# Patient Record
Sex: FEMALE | Employment: UNEMPLOYED | URBAN - METROPOLITAN AREA
[De-identification: names, ages, dates, MRNs, and addresses within clinical notes are randomized per-mention and may not be internally consistent; named-entity substitution may affect disease eponyms.]

---

## 2024-10-03 ENCOUNTER — NURSE TRIAGE (OUTPATIENT)
Age: 34
End: 2024-10-03

## 2024-10-03 NOTE — TELEPHONE ENCOUNTER
Regarding: NP unsure of LMP  ----- Message from Maikol CLEMENS sent at 10/3/2024  9:50 AM EDT -----  New patient wanting to schedule with Obgydallin Zafar. Seen at [facility] in [place] for positive pregnancy unsure of her LMP late August early September was told she was about 7 weeks.

## 2024-10-03 NOTE — TELEPHONE ENCOUNTER
"Called to patient to discuss new pregnancy. Reports she was seen at [place], [place] on 09/30 and had an US that put her at approx 7.5-8 weeks- patient states they were unsure. Also had beta hcg level at 57,778. Attempted to schedule dating and viability scan, no availability in the next few weeks. Called over to the office, spoke with Randa. She advised they will have look through and contact back back for an appointment. Was advised to send message to the clerical staff.   Reason for Disposition   Information only question and nurse able to answer    Answer Assessment - Initial Assessment Questions  1. REASON FOR CALL or QUESTION: \"What is your reason for calling today?\" or \"How can I best help you?\" or \"What question do you have that I can help answer?\"      Unknown LMP, positive pregnancy test    Protocols used: Information Only Call - No Triage-ADULT-[place]    "

## 2024-10-07 NOTE — TELEPHONE ENCOUNTER
Pt called regarding appt she says they had been calling her about stated she does not know why the mailbox was stating full because it is not. Spoke with Zoey @ the office who stated the appt was taken and she will call the pt back once she finds another she can offer her. Pt states to please give her first available and first thing in the day pt states she will take anything.

## 2024-10-07 NOTE — TELEPHONE ENCOUNTER
Called patient X2 to offer an appointment on 10/08/2024 in [place] with Candice Powell, no answer, mailbox full, and does not have a mychart

## 2024-10-08 ENCOUNTER — ULTRASOUND (OUTPATIENT)
Age: 34
End: 2024-10-08
Payer: COMMERCIAL

## 2024-10-08 VITALS — DIASTOLIC BLOOD PRESSURE: 80 MMHG | SYSTOLIC BLOOD PRESSURE: 116 MMHG | WEIGHT: 249 LBS

## 2024-10-08 DIAGNOSIS — Z34.90 EARLY STAGE OF PREGNANCY: Primary | ICD-10-CM

## 2024-10-08 DIAGNOSIS — N91.2 AMENORRHEA: ICD-10-CM

## 2024-10-08 DIAGNOSIS — O21.9 NAUSEA AND VOMITING OF PREGNANCY, ANTEPARTUM: ICD-10-CM

## 2024-10-08 PROCEDURE — 99204 OFFICE O/P NEW MOD 45 MIN: CPT | Performed by: STUDENT IN AN ORGANIZED HEALTH CARE EDUCATION/TRAINING PROGRAM

## 2024-10-08 PROCEDURE — 76817 TRANSVAGINAL US OBSTETRIC: CPT | Performed by: STUDENT IN AN ORGANIZED HEALTH CARE EDUCATION/TRAINING PROGRAM

## 2024-10-08 RX ORDER — METOCLOPRAMIDE 10 MG/1
10 TABLET ORAL EVERY 6 HOURS PRN
Qty: 30 TABLET | Refills: 0 | Status: SHIPPED | OUTPATIENT
Start: 2024-10-08

## 2024-10-08 NOTE — PROGRESS NOTES
Ambulatory Visit  Name: Araceli Levy      : 1990      MRN: [id number]  Encounter Provider: Delisa Pérez MD  Encounter Date: 10/8/2024   Encounter department: [facility] [place]    Assessment & Plan  Amenorrhea    Orders:    [facility] US OB < 14 weeks single or first gestation level 1    Early stage of pregnancy  Early pregnancy at 8 weeks 3 days with a calculated AKILAH of May 17, 2024 based on early ultrasound not c/w LMP    - Genetic screening options reviewed. She is interested in NIPT, so MFM referral placed  - Prenatal care reviewed  - Pregnancy precautions reviewed  - Nausea and vomiting of pregnancy: reviewed OTC meds, reglan rx sent. Safe and effective use reviewed    RTO for OB interview and PN-1 visit  Orders:    Ambulatory Referral to Maternal Fetal Medicine; Future    Nausea and vomiting of pregnancy, antepartum    Orders:    metoclopramide (Reglan) 10 mg tablet; Take 1 tablet (10 mg total) by mouth every 6 (six) hours as needed (nausea/vomiting)    History of Present Illness     Araceli Levy is a 34 y.o. female who presents for verification of pregnancy.  female, Patient's last menstrual period was 2024 (exact date).. Menses are irregular generally. Prior to this year, she had 13 years without a cycle. Started taking care of herself, exercising, and started to have regular cycles this year. This pregnancy was a surprise.   She is accompanied by her partner.     OBHx is significant for prior SAb.  Taking a prenatal vitamin.         Review of Systems +nausea, +vomiting, keeping almost no food down. No vaginal bleeding        Objective     /80 (BP Location: Left arm, Patient Position: Sitting, Cuff Size: Large)   Wt 113 kg (249 lb)   LMP 2024 (Exact Date)     Physical Exam  Vitals and nursing note reviewed.   Constitutional:       General: She is not in acute distress.     Appearance: She is well-developed.   HENT:       Head: Normocephalic and atraumatic.   Cardiovascular:      Rate and Rhythm: Normal rate.   Pulmonary:      Effort: Pulmonary effort is normal. No respiratory distress.   Genitourinary:     General: Normal vulva.      Cervix: No cervical motion tenderness or cervical bleeding.   Skin:     General: Skin is warm and dry.      Capillary Refill: Capillary refill takes less than 2 seconds.   Neurological:      Mental Status: She is alert.   Psychiatric:         Mood and Affect: Mood normal.

## 2024-10-08 NOTE — PROGRESS NOTES
Pt is here for early ultrasound     G2  Miscarriage 15yrs ago   LMP 8/1  Irregular Cycles   9 weeks 5 days   AKILAH 5/8/25  [place] Pregnancy   No Vaginal Bleeding   Nausea and Vomiting  Blood Type N/A   Ultrasound Probe Disinfection    A transvaginal ultrasound was performed.   Prior to use, disinfection was performed with High Level Disinfection Process ([email or web address])  Probe serial number [id number]:  [id number] was used    ZACH NAVA MA  10/08/24  11:16 AM

## 2024-10-14 ENCOUNTER — TELEPHONE (OUTPATIENT)
Age: 34
End: 2024-10-14

## 2024-10-14 NOTE — TELEPHONE ENCOUNTER
Spoke with patients mother who is on communication consent form. She advised pt was seen in the ER on 10/11 for vaginal discharge and vomiting.  US showed possible polyp and they googled and read that it can cause a miscarriage.  They are now concerned.  She denies pt is having any bleeding.     Impression    1.   Live intrauterine pregnancy 8 weeks 4 days gestation.  No adnexal mass or  torsion.  2.   Heterogeneous echogenic focus within the cervical canal possibly polyp  although exact etiology uncertain. Hemorrhage or hematoma.

## 2024-10-18 NOTE — PATIENT INSTRUCTIONS
Congratulations!! Please review our Pregnancy Essential Guide and [facility] L&D Virtual tour from our networks website.     [facility] Pregnancy Essentials Guide  [facility] ([email or web address])     [facility] ([facility])

## 2024-10-21 ENCOUNTER — INITIAL PRENATAL (OUTPATIENT)
Dept: OBGYN CLINIC | Facility: CLINIC | Age: 34
End: 2024-10-21
Payer: COMMERCIAL

## 2024-10-21 VITALS — BODY MASS INDEX: 41.48 KG/M2 | HEIGHT: 65 IN | WEIGHT: 249 LBS

## 2024-10-21 DIAGNOSIS — Z36.9 ENCOUNTER FOR ANTENATAL SCREENING: ICD-10-CM

## 2024-10-21 DIAGNOSIS — Z31.430 ENCOUNTER OF FEMALE FOR TESTING FOR GENETIC DISEASE CARRIER STATUS FOR PROCREATIVE MANAGEMENT: ICD-10-CM

## 2024-10-21 DIAGNOSIS — Z34.81 PRENATAL CARE, SUBSEQUENT PREGNANCY, FIRST TRIMESTER: Primary | ICD-10-CM

## 2024-10-21 PROCEDURE — T1001 NURSING ASSESSMENT/EVALUATN: HCPCS

## 2024-10-21 NOTE — PROGRESS NOTES
OB INTAKE INTERVIEW  Patient is 34 y.o. who presents for OB intake at 10 2/7 wks  She is accompanied by herself during this encounter  The father of her baby Pierre Carrillo is involved in the pregnancy and is 36 years old.      Last Menstrual Period: 24  Ultrasound: Measured 8 weeks 3 days on 10/8/24  Estimated Date of Delivery: 25 changed by 8 week US    Signs/Symptoms of Pregnancy  Current pregnancy symptoms: Lower back ache and nausea  Constipation no  Headaches YES  Cramping/spotting YES- mild cramping occasional  PICA cravings no    Diabetes-  Body mass index is 41.44 kg/m².  If patient has 1 or more, please order early 1 hour GTT  History of GDM no  BMI >35 YES  History of PCOS or current metformin use no  History of LGA/macrosomic infant (4000g/9lbs) no    If patient has 2 or more, please order early 1 hour GTT  BMI>30 YES  AMA no  First degree relative with type 2 diabetes YES- Pt's Father T2DM  History of chronic HTN, hyperlipidemia, elevated A1C no  High risk race (, , ,  or ) YES-    Hypertension- if you answer yes to any of the following, please order baseline preeclampsia labs (cbc, comprehensive metabolic panel, urine protein creatinine ratio, uric acid)  History of of chronic HTN no  History of gestational HTN no  History of preeclampsia, eclampsia, or HELLP syndrome no  History of diabetes no  History of lupus,sjogrens syndrome, kidney disease no    Thyroid- if yes order TSH with reflex T4  History of thyroid disease no    Bleeding Disorder or Hx of DVT-patient or first degree relative with history of. Order the following if not done previously.   (Factor V, antithrombin III, prothrombin gene mutation, protein C and S Ag, lupus anticoagulant, anticardiolipin, beta-2 glycoprotein)   no    OB/GYN-  History of abnormal pap smear no       Date of last pap smear Never had a pap  History of HPV no  History of Herpes/HSV no  History  of other STI (gonorrhea, chlamydia, trich) no  History of prior  no  History of prior  no  History of  delivery prior to 36 weeks 6 days no  History of Varicella or Vaccination Does not remember getting vaccine. Did not have Varicella.  History of blood transfusion NO  Ok for blood transfusion YES    Substance screening-   History of tobacco use no  Currently using tobacco no  Substance Use Screen Level (N/A, LOW, HIGH) N/A    MRSA Screening-   Does the pt have a hx of MRSA? no    Immunizations:  Influenza vaccine given this season Not interested  Discussed Tdap vaccine YES  Discussed COVID Vaccine YES    Genetic/[facility]-  Do you or your partner have a history of any of the following in yourselves or first degree relatives?  Cystic fibrosis no  Spinal muscular atrophy no  Hemoglobinopathy/Sickle Cell/Thalassemia no  Fragile X Intellectual Disability no        If no, discuss Carrier Screening being completed once in a lifetime as a standard of care lab test. Place orders for Cystic Fibrosis Gene Test (XBQ602) and Spinal Muscular Atrophy DNA ([id number])      Appointment for Nuchal Translucency Ultrasound at [facility] scheduled for 24      Interview education  [facility] Pregnancy Essentials Book reviewed, discussed and attached to their AVS YES    Nurse/Family Partnership- patient may qualify NO; referral placed NO    Prenatal lab work scripts YES  Extra labs ordered:  CF and SMA screening  1 hr gtt    Aspirin/Preeclampsia Screen    Risk Level Risk Factor Recommendation   LOW Prior Uncomplicated full-term delivery no No Aspirin recommendation        MODERATE Nulliparity YES Recommend low-dose aspirin if     BMI>30 YES 2 or more moderate risk factors    Family History Preeclampsia (mother/sister) no     35yr old or greater no     Black Race, Concern for SDOH/Low Socioeconomic no     IVF Pregnancy  no     Personal History Risks (low birth weight, prior adverse preg outcome, >10yr preg interval) no          HIGH History of Preeclampsia no Recommend low-dose aspirin if     Multifetal gestation no 1 or more high risk factors    Chronic HTN no     Type 1 or 2 Diabetes no     Renal Disease no     Autoimmune Disease  no      Contraindications to ASA therapy:  NSAID/ ASA allergy: no  Nasal polyps: no  Asthma with history of ASA induced bronchospasm: no  Relative contraindications:  History of GI bleed: no  Active peptic ulcer disease: no  Severe hepatic dysfunction: no    Patient should be recommended to take ASA 162mg during this pregnancy from 12-36wks to lower her risk of preeclampsia: ASA therapy discussed.      The patient has a history now or in prior pregnancy notable for:  EPDS-1      Details that I feel the provider should be aware of: This is an unplanned however welcomed pregnancy for parents. This is a surprise pregnancy. Pt states she and FOB tried getting pregnant 14 years ago with no success. Pt states her OB at the time told her she was healthy enough to become pregnant and no issues were found. Pt states she does not remember ever having a pap done. Patient is experiencing some nausea. OTC meds and prescribed med was discussed including diet. 1 Hr GTT was ordered per screen. Pt knows when to call Doctor and how to contact her nurse navigator. Patient verbalized understanding.Patient knows to have lab orders completed before next appointment.          PN1 visit scheduled. The patient was oriented to our practice, the navigator role, reviewed delivering physicians and [facility] for Delivery. All questions were answered.    Interviewed by: Jo Ramirez RN

## 2024-10-23 ENCOUNTER — TELEPHONE (OUTPATIENT)
Age: 34
End: 2024-10-23

## 2024-10-23 NOTE — TELEPHONE ENCOUNTER
10w4d OB patient called in stating she was given an rx for zofran when she was seen in the [place] ED on 10/21 for nausea and vomiting. Went to her pharmacy to fill this however they advised since the prescribing physician is a [place] physician, she would have to contact her primary OB to create a new prescription and send to the pharm for refill. ED Note from 10/21 is in chart. Looks as though it was the ondansetron (ZOFRAN-ODT) 4 mg disintegrating tablet that was being prescribed. Please advise if you are able to send this over.

## 2024-10-23 NOTE — TELEPHONE ENCOUNTER
[place] physicians can Rx to pharmacies in [place]. However, we should discuss risks/benefits with her prior to prescribing.

## 2024-10-25 ENCOUNTER — OFFICE VISIT (OUTPATIENT)
Age: 34
End: 2024-10-25
Payer: COMMERCIAL

## 2024-10-25 ENCOUNTER — HOSPITAL ENCOUNTER (EMERGENCY)
Facility: HOSPITAL | Age: 34
Discharge: HOME | End: 2024-10-25
Attending: EMERGENCY MEDICINE
Payer: COMMERCIAL

## 2024-10-25 VITALS
SYSTOLIC BLOOD PRESSURE: 118 MMHG | BODY MASS INDEX: 41.64 KG/M2 | RESPIRATION RATE: 18 BRPM | OXYGEN SATURATION: 98 % | TEMPERATURE: 98.2 F | DIASTOLIC BLOOD PRESSURE: 84 MMHG | HEART RATE: 84 BPM | WEIGHT: 250.2 LBS

## 2024-10-25 VITALS
SYSTOLIC BLOOD PRESSURE: 136 MMHG | RESPIRATION RATE: 20 BRPM | HEART RATE: 101 BPM | DIASTOLIC BLOOD PRESSURE: 78 MMHG | TEMPERATURE: 98.5 F | OXYGEN SATURATION: 97 %

## 2024-10-25 DIAGNOSIS — Z3A.10 10 WEEKS GESTATION OF PREGNANCY: ICD-10-CM

## 2024-10-25 DIAGNOSIS — R10.9 ABDOMINAL PAIN DURING PREGNANCY IN FIRST TRIMESTER: Primary | ICD-10-CM

## 2024-10-25 DIAGNOSIS — K21.9 ACID REFLUX: ICD-10-CM

## 2024-10-25 DIAGNOSIS — M25.512 ACUTE PAIN OF LEFT SHOULDER: ICD-10-CM

## 2024-10-25 DIAGNOSIS — M25.512 ACUTE PAIN OF LEFT SHOULDER: Primary | ICD-10-CM

## 2024-10-25 DIAGNOSIS — O26.891 ABDOMINAL PAIN DURING PREGNANCY IN FIRST TRIMESTER: Primary | ICD-10-CM

## 2024-10-25 LAB
BILIRUB UR QL STRIP: NEGATIVE
CLARITY UR: CLEAR
COLOR UR: NORMAL
GLUCOSE UR STRIP-MCNC: NEGATIVE MG/DL
HGB UR QL STRIP.AUTO: NEGATIVE
KETONES UR STRIP-MCNC: NEGATIVE MG/DL
LEUKOCYTE ESTERASE UR QL STRIP: NEGATIVE
NITRITE UR QL STRIP: NEGATIVE
PH UR STRIP.AUTO: 5.5 [PH]
PROT UR STRIP-MCNC: NEGATIVE MG/DL
SP GR UR STRIP.AUTO: 1.01 (ref 1–1.03)
UROBILINOGEN UR STRIP-ACNC: <2 MG/DL

## 2024-10-25 PROCEDURE — 87086 URINE CULTURE/COLONY COUNT: CPT

## 2024-10-25 PROCEDURE — 76815 OB US LIMITED FETUS(S): CPT | Performed by: EMERGENCY MEDICINE

## 2024-10-25 PROCEDURE — S9088 SERVICES PROVIDED IN URGENT: HCPCS | Performed by: PHYSICIAN ASSISTANT

## 2024-10-25 PROCEDURE — 99284 EMERGENCY DEPT VISIT MOD MDM: CPT | Performed by: EMERGENCY MEDICINE

## 2024-10-25 PROCEDURE — 76705 ECHO EXAM OF ABDOMEN: CPT | Performed by: EMERGENCY MEDICINE

## 2024-10-25 PROCEDURE — 99283 EMERGENCY DEPT VISIT LOW MDM: CPT

## 2024-10-25 PROCEDURE — 81003 URINALYSIS AUTO W/O SCOPE: CPT

## 2024-10-25 PROCEDURE — 99213 OFFICE O/P EST LOW 20 MIN: CPT | Performed by: PHYSICIAN ASSISTANT

## 2024-10-25 RX ORDER — FAMOTIDINE 20 MG/1
20 TABLET, FILM COATED ORAL 2 TIMES DAILY
Qty: 30 TABLET | Refills: 0 | Status: SHIPPED | OUTPATIENT
Start: 2024-10-25

## 2024-10-25 RX ORDER — ONDANSETRON 4 MG/1
4 TABLET, ORALLY DISINTEGRATING ORAL EVERY 6 HOURS PRN
Qty: 20 TABLET | Refills: 0 | Status: SHIPPED | OUTPATIENT
Start: 2024-10-25

## 2024-10-25 RX ORDER — FAMOTIDINE 20 MG/1
20 TABLET, FILM COATED ORAL ONCE
Status: COMPLETED | OUTPATIENT
Start: 2024-10-25 | End: 2024-10-25

## 2024-10-25 RX ORDER — LIDOCAINE 50 MG/G
1 PATCH TOPICAL ONCE
Status: DISCONTINUED | OUTPATIENT
Start: 2024-10-25 | End: 2024-10-25 | Stop reason: HOSPADM

## 2024-10-25 RX ORDER — LIDOCAINE 4 G/G
1 PATCH TOPICAL DAILY PRN
Qty: 5 PATCH | Refills: 0 | Status: SHIPPED | OUTPATIENT
Start: 2024-10-25 | End: 2024-10-30

## 2024-10-25 RX ADMIN — LIDOCAINE 1 PATCH: 50 PATCH CUTANEOUS at 13:46

## 2024-10-25 RX ADMIN — FAMOTIDINE 20 MG: 20 TABLET, FILM COATED ORAL at 14:20

## 2024-10-25 NOTE — PROGRESS NOTES
[facility]        NAME: Araceli Levy is a 34 y.o. female  : 1990    MRN: [id number]  DATE: 2024  TIME: 11:29 AM    Assessment and Plan   Abdominal pain during pregnancy in first trimester [O26.891, R10.9]  1. Abdominal pain during pregnancy in first trimester  Transfer to other facility      2. Acute pain of left shoulder            Referral to [place] ED for evaluation of abdominal pain in first trimester pregnancy.  Recommend Tylenol and heat for left shoulder pain follow-up with orthopedics if still having problems.    The patient and/or parent/legal guardian verbalized understanding of exam findings and   Treatment plan. We engaged in the shared decision-making process and treatment options were   discussed at length with the patient.  All questions, concerns and complaints were answered and   addressed to the patient's' and/or parent/legal guardians's satisfaction.    Patient Instructions   There are no Patient Instructions on file for this visit.    Follow up with PCP in 3-5 days.  Proceed to  ER if symptoms worsen.    If tests are performed, our office will contact you with results only if   changes need to made to the care plan discussed with you at the visit.   You can review your full results on [facility].     Chief Complaint     Chief Complaint   Patient presents with    Shoulder Pain     Pain superior aspect L shoulder X 6 days getting worse. Is 11 weeks pregnant and concerned this could be pregnancy related. No injury or event         History of Present Illness       HPI  Patient reports left posterior superior shoulder pain and pain left side neck over the past 6 days has been getting worse.. Last night pain was very sharp and getting worse today. Nurse midwife recommended evaluation.  She reports mild right and left lower quadrant abdominal pain as well.    Review of Systems   Review of Systems  All other related systems reviewed and are negative except as  noted in HPI    Current Medications       Current Outpatient Medications:     metoclopramide (Reglan) 10 mg tablet, Take 1 tablet (10 mg total) by mouth every 6 (six) hours as needed (nausea/vomiting) (Patient not taking: Reported on 10/21/2024), Disp: 30 tablet, Rfl: 0    Current Allergies     Allergies as of 10/25/2024 - Reviewed 10/25/2024   Allergen Reaction Noted    Cherry - food allergy Itching 10/21/2024    Flavoring agent - food allergy Rash 05/05/2021    Shellfish allergy - food allergy Rash 05/05/2021    Sulfa antibiotics Rash 05/05/2021            The following portions of the patient's history were reviewed and updated as appropriate: allergies, current medications, past family history, past medical history, past social history, past surgical history and problem list.     No past medical history on file.    Past Surgical History:   Procedure Laterality Date    OTHER SURGICAL HISTORY Right     Arm Fractured bone at 6 y.o       Family History   Problem Relation Age of Onset    Thyroid disease Mother     Other Mother         Tachycardia    Heart attack Father         Pacemaker    Diabetes Father         T2DM    No Known Problems Sister     Other Sister         Tachycardia due to anxiety    No Known Problems Sister     Diabetes Maternal Grandmother     Stroke Maternal Grandmother     No Known Problems Paternal Grandmother     Diabetes Paternal Grandfather     Breast cancer Neg Hx     Colon cancer Neg Hx     Ovarian cancer Neg Hx          Medications have been verified.        Objective   /84 (BP Location: Right arm, Patient Position: Sitting, Cuff Size: Large)   Pulse 84   Temp 98.2 °F (36.8 °C) (Tympanic)   Resp 18   Wt 113 kg (250 lb 3.2 oz)   LMP 08/01/2024 (Exact Date)   SpO2 98%   BMI 41.64 kg/m²   Patient's last menstrual period was 08/01/2024 (exact date).       Physical Exam     Physical Exam  Constitutional:       General: She is not in acute distress.     Appearance: She is  "well-developed.   HENT:      Head: Normocephalic and atraumatic.   Eyes:      General: No scleral icterus.     Conjunctiva/sclera: Conjunctivae normal.   Neck:      Trachea: No tracheal deviation.   Pulmonary:      Effort: Pulmonary effort is normal. No respiratory distress.      Breath sounds: No stridor.   Abdominal:      General: There is distension.      Tenderness: There is no abdominal tenderness.   Musculoskeletal:      Cervical back: Normal range of motion.      Comments: Left shoulder range of motion within normal limits negative [facility] test.  Tenderness palpation over the trapezius and paraspinal musculature of the left neck   Skin:     General: Skin is warm and dry.      Findings: No erythema.   Neurological:      Mental Status: She is alert and oriented to person, place, and time.   Psychiatric:         Behavior: Behavior normal.         Ortho Exam        Procedures  No Procedures performed today        Note: Portions of this record may have been created with voice recognition software. Occasional wrong word or \"sound a like\" substitutions may have occurred due to the inherent limitations of voice recognition software. Please read the chart carefully and recognize, using context, where substitutions have occurred.*      "

## 2024-10-25 NOTE — TELEPHONE ENCOUNTER
Patient called back in advised note from  about being seen in the office for the script. She stated she will see what she can do till her next appointment she may just pay OOP for it till then.

## 2024-10-25 NOTE — ED PROVIDER NOTES
Time reflects when diagnosis was documented in both MDM as applicable and the Disposition within this note       Time User Action Codes Description Comment    10/25/2024  2:05 PM Brittany Flores [M25.512] Acute pain of left shoulder     10/25/2024  2:05 PM Brittany Flores [Z3A.10] 10 weeks gestation of pregnancy     10/25/2024  2:12 PM Brittany Flores [K21.9] Acid reflux           ED Disposition       ED Disposition   Discharge    Condition   Stable    Date/Time   Fri Oct 25, 2024  2:05 PM    Comment   Araceli Levy discharge to home/self care.                   Assessment & Plan       Medical Decision Making  34-year-old  female at 10w6d gestation presenting for left shoulder pain.      Differential diagnoses: Muscle strain/sprain, UTI    Patient presents hemodynamically stable and in no acute distress.  Palpable tenderness to the patient's left shoulder, but no associated motor or sensation deficits.  Lidocaine patch applied to the left shoulder.  Patient also reports some subjective symptoms of acid reflux and given p.o. Pepcid.  UA shows no signs of UTI.  Bedside transabdominal ultrasound confirmed an intrauterine pregnancy with active fetal movement and cardiac activity with heart rate around 161 bpm.  Bilateral kidneys also ultrasounded, which showed no signs of hydronephrosis or right or left upper quadrant free abdominal fluid.  Patient comfortable with and stable for discharge home with return precautions and new prescriptions for lidocaine patches, Pepcid, and Zofran.    Amount and/or Complexity of Data Reviewed  Labs: ordered. Decision-making details documented in ED Course.    Risk  OTC drugs.  Prescription drug management.        ED Course as of 10/25/24 2216   Fri Oct 25, 2024   1344 Bedside transabdominal ultrasound performed, showing in intrauterine pregnancy with active fetal movement and cardiac activity, heart rate measured around 161 bpm.  Right and left upper  quadrant also ultrasounded, which showed no signs of intra-abdominal free fluid or marked distention of the bilateral kidneys.   1400 Clarity, UA: Clear   1401 Leukocytes, UA: Negative   1401 Nitrite, UA: Negative   1414 Patient reports some symptoms of acid reflux, p.o. Pepcid ordered.  After patient receives this medication, stable for discharge home.       Medications   famotidine (PEPCID) tablet 20 mg (20 mg Oral Given 10/25/24 1420)       ED Risk Strat Scores                                               History of Present Illness       Chief Complaint   Patient presents with    Shoulder Pain     Pt c/o left shoulder pain x6 days. 11 weeks pregnant, denies vaginal bleeding. Reports mild cramping.        History reviewed. No pertinent past medical history.   Past Surgical History:   Procedure Laterality Date    OTHER SURGICAL HISTORY Right     Arm Fractured bone at 6 y.o      Family History   Problem Relation Age of Onset    Thyroid disease Mother     Other Mother         Tachycardia    Heart attack Father         Pacemaker    Diabetes Father         T2DM    No Known Problems Sister     Other Sister         Tachycardia due to anxiety    No Known Problems Sister     Diabetes Maternal Grandmother     Stroke Maternal Grandmother     No Known Problems Paternal Grandmother     Diabetes Paternal Grandfather     Breast cancer Neg Hx     Colon cancer Neg Hx     Ovarian cancer Neg Hx       Social History     Tobacco Use    Smoking status: Never    Smokeless tobacco: Never   Vaping Use    Vaping status: Never Used   Substance Use Topics    Alcohol use: Never    Drug use: Never      E-Cigarette/Vaping    E-Cigarette Use Never User       E-Cigarette/Vaping Substances    Nicotine No     THC No     CBD No     Flavoring No       I have reviewed and agree with the history as documented.     34-year-old  female at 10w6d gestation presenting for left shoulder pain.  Patient has been having ongoing left shoulder pain for  the past 6 days, without any inciting injury or trauma to the area.  Patient states that the pain starts over the top of her shoulder and radiates into her left neck and left posterior shoulder.  Yesterday, the pain became more acutely sharp in quality.  Patient presented to the urgent care clinic today, but was advised to follow-up in the ED for further follow-up and to assess current pregnancy, since they did not have the ability to check fetal heart tones in their office.  Patient denies any weakness or numbness and tingling into her left arm.  Reports ongoing nausea and vomiting with mild abdominal cramping consistent with her baseline throughout this pregnancy so far.  Patient does report recent cloudy urine, but denies any dysuria or hematuria. Denies any contractions, vaginal bleeding, or loss of fluid.  Denies any fevers, chills, headaches, vision changes, chest pain, SOB, severe abdominal pain different from her baseline, or diarrhea.            Review of Systems   Constitutional:  Negative for chills and fever.   Eyes:  Negative for visual disturbance.   Respiratory:  Negative for shortness of breath.    Cardiovascular:  Negative for chest pain and palpitations.   Gastrointestinal:  Positive for abdominal pain, nausea and vomiting. Negative for diarrhea.        Baseline intermittent abdominal cramping.    Genitourinary:  Negative for dysuria, hematuria, pelvic pain, vaginal bleeding and vaginal discharge.   Musculoskeletal:  Positive for arthralgias, myalgias and neck pain.        Left shoulder and neck pain.    Skin:  Negative for rash and wound.   Neurological:  Negative for dizziness, weakness, light-headedness, numbness and headaches.   Psychiatric/Behavioral:  Negative for confusion.    All other systems reviewed and are negative.          Objective       ED Triage Vitals [10/25/24 1252]   Temperature Pulse Blood Pressure Respirations SpO2 Patient Position - Orthostatic VS   98.5 °F (36.9 °C) 101  136/78 20 97 % Sitting      Temp Source Heart Rate Source BP Location FiO2 (%) Pain Score    Oral Monitor Right arm -- --      Vitals      Date and Time Temp Pulse SpO2 Resp BP Pain Score FACES Pain Rating User   10/25/24 1252 98.5 °F (36.9 °C) 101 97 % 20 136/78 -- -- TS            Physical Exam  Vitals and nursing note reviewed.   Constitutional:       General: She is not in acute distress.     Appearance: Normal appearance. She is normal weight. She is not ill-appearing, toxic-appearing or diaphoretic.   HENT:      Head: Normocephalic and atraumatic.      Right Ear: External ear normal.      Left Ear: External ear normal.      Nose: Nose normal.      Mouth/Throat:      Mouth: Mucous membranes are moist.   Eyes:      General: No scleral icterus.        Right eye: No discharge.         Left eye: No discharge.      Extraocular Movements: Extraocular movements intact.      Conjunctiva/sclera: Conjunctivae normal.   Cardiovascular:      Rate and Rhythm: Normal rate and regular rhythm.      Pulses: Normal pulses.      Heart sounds: Normal heart sounds. No murmur heard.  Pulmonary:      Effort: Pulmonary effort is normal. No respiratory distress.      Breath sounds: Normal breath sounds. No wheezing, rhonchi or rales.   Abdominal:      General: Abdomen is flat.      Palpations: Abdomen is soft.      Tenderness: There is no abdominal tenderness. There is no guarding or rebound.      Comments: Soft, gravid abdomen.   Mild left flank discomfort to palpation.    Musculoskeletal:         General: Normal range of motion.      Cervical back: Normal range of motion.      Right lower leg: No edema.      Left lower leg: No edema.      Comments: No cervical spine midline tenderness.  Tenderness over top of left shoulder, posterior shoulder, and left neck muscles.   Skin:     General: Skin is warm and dry.      Capillary Refill: Capillary refill takes less than 2 seconds.   Neurological:      General: No focal deficit present.       Mental Status: She is alert and oriented to person, place, and time. Mental status is at baseline.      Sensory: No sensory deficit.      Motor: No weakness.      Gait: Gait normal.      Comments: Intact and equal upper extremity strength and sensation.  Able to actively lift left arm without any pain.   Psychiatric:         Mood and Affect: Mood normal.         Behavior: Behavior normal.         Results Reviewed       Procedure Component Value Units Date/Time    UA w Reflex to Microscopic w Reflex to Culture [[id number]] Collected: 10/25/24 1344    Lab Status: Final result Specimen: Urine, Clean Catch Updated: 10/25/24 1359     Color, UA Light Yellow     Clarity, UA Clear     Specific Gravity, UA 1.011     pH, UA 5.5     Leukocytes, UA Negative     Nitrite, UA Negative     Protein, UA Negative mg/dl      Glucose, UA Negative mg/dl      Ketones, UA Negative mg/dl      Urobilinogen, UA <2.0 mg/dl      Bilirubin, UA Negative     Occult Blood, UA Negative     URINE COMMENT --    Urine culture [[id number]] Collected: 10/25/24 1344    Lab Status: In process Specimen: Urine, Clean Catch Updated: 10/25/24 1359            No orders to display       POC Pelvic US    Date/Time: 10/25/2024 1:45 PM    Performed by: Brittany Flores MD  Authorized by: Brittany Flores MD    Patient location:  ED  Other Assisting Provider: Yes (comment) (Dr. Cam Bird)    Procedure details:     Exam Type:  Diagnostic    Indications comment:  Non-OB shoulder pain    Assessment for: evaluate fetal viability      Technique:  Transabdominal obstetric (HCG+) exam    Views obtained: uterus (transverse and sagittal)      Image quality: diagnostic      Image availability:  Images available in PACS  Uterine findings:     Single gestation: identified      Fetal heart rate: identified      Fetal heart rate (bpm):  161  Interpretation:     Ultrasound impressions: normal      Pregnancy findings: intrauterine pregnancy (IUP)    POC Renal  US    Date/Time: 10/25/2024 1:45 PM    Performed by: Brittany Flores MD  Authorized by: Brittany Flores MD    Patient location:  ED  Performed by:  Resident  Other Assisting Provider: Yes (comment) (Dr. Mo Bowie)    Procedure details:     Exam Type:  Diagnostic    Indications: flank/back pain      Assessment for:  Suspected hydronephrosis    Views obtained: left kidney and right kidney      Image quality: limited diagnostic      Image availability:  Images available in PACS  Findings:     LEFT kidney findings: unremarkable      LEFT hydronephrosis: none      RIGHT kidney findings: unremarkable      RIGHT hydronephrosis: none    Interpretation:     Renal ultrasound impressions: normal exam        ED Medication and Procedure Management   Prior to Admission Medications   Prescriptions Last Dose Informant Patient Reported? Taking?   metoclopramide (Reglan) 10 mg tablet   No No   Sig: Take 1 tablet (10 mg total) by mouth every 6 (six) hours as needed (nausea/vomiting)   Patient not taking: Reported on 10/21/2024      Facility-Administered Medications: None     Discharge Medication List as of 10/25/2024  2:22 PM        START taking these medications    Details   famotidine (PEPCID) 20 mg tablet Take 1 tablet (20 mg total) by mouth 2 (two) times a day, Starting Fri 10/25/2024, Normal      Lidocaine 4 % PTCH Apply 1 patch topically daily as needed (left shoulder pain) for up to 5 days, Starting Fri 10/25/2024, Until Wed 10/30/2024 at 2359, Normal      ondansetron (ZOFRAN-ODT) 4 mg disintegrating tablet Take 1 tablet (4 mg total) by mouth every 6 (six) hours as needed for nausea or vomiting, Starting Fri 10/25/2024, Normal           CONTINUE these medications which have NOT CHANGED    Details   metoclopramide (Reglan) 10 mg tablet Take 1 tablet (10 mg total) by mouth every 6 (six) hours as needed (nausea/vomiting), Starting Tue 10/8/2024, Normal           No discharge procedures on file.  ED SEPSIS DOCUMENTATION    Time reflects when diagnosis was documented in both MDM as applicable and the Disposition within this note       Time User Action Codes Description Comment    10/25/2024  2:05 PM Brittany Flores [M25.512] Acute pain of left shoulder     10/25/2024  2:05 PM Brittany Flores [Z3A.10] 10 weeks gestation of pregnancy     10/25/2024  2:12 PM Brittany Flores [K21.9] Acid reflux                  Brittany Flores MD  10/25/24 4309

## 2024-10-26 LAB — BACTERIA UR CULT: NORMAL

## 2024-10-28 ENCOUNTER — TELEPHONE (OUTPATIENT)
Age: 34
End: 2024-10-28

## 2024-10-28 NOTE — ED ATTENDING ATTESTATION
10/25/2024  IMo MD, saw and evaluated the patient. I have discussed the patient with the resident/non-physician practitioner and agree with the resident's/non-physician practitioner's findings, Plan of Care, and MDM as documented in the resident's/non-physician practitioner's note, except where noted. All available labs and Radiology studies were reviewed.  I was present for key portions of any procedure(s) performed by the resident/non-physician practitioner and I was immediately available to provide assistance.       At this point I agree with the current assessment done in the Emergency Department.  I have conducted an independent evaluation of this patient a history and physical is as follows:SEE H AND P ABOVE     ED Course  ED Course as of 10/28/24 1051   Fri Oct 25, 2024   1342 ER MD NOTE- PT SEEN AND THOROUGHLY EVALUATED BY ER MD- CASE D/W ER RESIDENT -- 34 YR FEMALE AT APPROX 11 WEEKS IUP- BY DATES-- OVER LAST 6 DAYS WITH TOP OF SHOULDER PAIN -- NO INJURY --  NO NECK PAIN RAD TO LEFT SHOULDER- PAIN MADE WORSE WITH MILD ACTIVE NECK ROTATION -- NO LUE WEAKNESS/ LOSS OF SENSATION- RADIATION DOWN LUE-- NO HEAD/FACIAL PAIN - NO CP/SOB- NO REAL ABD/ FLANK  PAIN - NO NV/D- NO /GYN COMPS- AVSS- PULSE OX 97 % ON RA- INTERPRETATION IS NORMAL- NO INTERVENTION - WELL APPEARING IN NAD - RRR S1/S2-CTA-B/SOFT NT/ND- NO HSM- NO CVA TENDERNESS- NORMAL PERITONEAL - SIGNS - LEFT TRAPEZIUS TENDERNESS UPON ACTIVE NECK ROM- NORMAL NON FOCAL NEURO EXAM- NORMAL CN EXAM/ NORMAL BUE/BLE STRENGTH/SENSATION - - NORMAL LUE DISTAL PULSE-SENSATION/STRENGTH/ROM /CAP REFILL          Critical Care Time  Procedures

## 2024-10-28 NOTE — TELEPHONE ENCOUNTER
Patient requesting a call back to discuss documented BP from visits to ED and Urgent care as well as directions from provider about what to do after dog jumped on her.

## 2024-10-28 NOTE — TELEPHONE ENCOUNTER
Pt was seen in ED  yesterday   because dog ran into her stomach  she is 11 weeks  and ED said everything was ok  but to follow up with OB

## 2024-10-29 ENCOUNTER — HOSPITAL ENCOUNTER (EMERGENCY)
Facility: HOSPITAL | Age: 34
Discharge: HOME | End: 2024-10-29
Attending: EMERGENCY MEDICINE | Admitting: EMERGENCY MEDICINE
Payer: COMMERCIAL

## 2024-10-29 ENCOUNTER — TELEPHONE (OUTPATIENT)
Age: 34
End: 2024-10-29

## 2024-10-29 ENCOUNTER — APPOINTMENT (OUTPATIENT)
Dept: LAB | Facility: HOSPITAL | Age: 34
End: 2024-10-29
Attending: STUDENT IN AN ORGANIZED HEALTH CARE EDUCATION/TRAINING PROGRAM
Payer: COMMERCIAL

## 2024-10-29 VITALS
DIASTOLIC BLOOD PRESSURE: 84 MMHG | OXYGEN SATURATION: 96 % | RESPIRATION RATE: 18 BRPM | TEMPERATURE: 98.5 F | SYSTOLIC BLOOD PRESSURE: 129 MMHG | HEART RATE: 105 BPM

## 2024-10-29 DIAGNOSIS — Z31.430 ENCOUNTER OF FEMALE FOR TESTING FOR GENETIC DISEASE CARRIER STATUS FOR PROCREATIVE MANAGEMENT: ICD-10-CM

## 2024-10-29 DIAGNOSIS — Z01.30 BLOOD PRESSURE CHECK: Primary | ICD-10-CM

## 2024-10-29 DIAGNOSIS — Z36.9 ENCOUNTER FOR ANTENATAL SCREENING: ICD-10-CM

## 2024-10-29 DIAGNOSIS — Z34.81 PRENATAL CARE, SUBSEQUENT PREGNANCY, FIRST TRIMESTER: ICD-10-CM

## 2024-10-29 LAB
ABO GROUP BLD: NORMAL
BACTERIA UR QL AUTO: ABNORMAL /HPF
BASOPHILS # BLD AUTO: 0.06 THOUSANDS/ΜL (ref 0–0.1)
BASOPHILS NFR BLD AUTO: 1 % (ref 0–1)
BILIRUB UR QL STRIP: NEGATIVE
BLD GP AB SCN SERPL QL: NEGATIVE
CLARITY UR: CLEAR
COLOR UR: COLORLESS
EOSINOPHIL # BLD AUTO: 0.47 THOUSAND/ΜL (ref 0–0.61)
EOSINOPHIL NFR BLD AUTO: 5 % (ref 0–6)
ERYTHROCYTE [DISTWIDTH] IN BLOOD BY AUTOMATED COUNT: 12.8 % (ref 11.6–15.1)
GLUCOSE 1H P 50 G GLC PO SERPL-MCNC: 164 MG/DL (ref 70–134)
GLUCOSE UR STRIP-MCNC: ABNORMAL MG/DL
HBV SURFACE AG SER QL: NORMAL
HCT VFR BLD AUTO: 41.4 % (ref 34.8–46.1)
HCV AB SER QL: NORMAL
HGB BLD-MCNC: 13.3 G/DL (ref 11.5–15.4)
HGB UR QL STRIP.AUTO: NEGATIVE
HIV 1+2 AB+HIV1 P24 AG SERPL QL IA: NORMAL
HIV 2 AB SERPL QL IA: NORMAL
HIV1 AB SERPL QL IA: NORMAL
HIV1 P24 AG SERPL QL IA: NORMAL
IMM GRANULOCYTES # BLD AUTO: 0.17 THOUSAND/UL (ref 0–0.2)
IMM GRANULOCYTES NFR BLD AUTO: 2 % (ref 0–2)
KETONES UR STRIP-MCNC: NEGATIVE MG/DL
LEUKOCYTE ESTERASE UR QL STRIP: NEGATIVE
LYMPHOCYTES # BLD AUTO: 2.23 THOUSANDS/ΜL (ref 0.6–4.47)
LYMPHOCYTES NFR BLD AUTO: 22 % (ref 14–44)
MCH RBC QN AUTO: 26.1 PG (ref 26.8–34.3)
MCHC RBC AUTO-ENTMCNC: 32.1 G/DL (ref 31.4–37.4)
MCV RBC AUTO: 81 FL (ref 82–98)
MONOCYTES # BLD AUTO: 0.56 THOUSAND/ΜL (ref 0.17–1.22)
MONOCYTES NFR BLD AUTO: 6 % (ref 4–12)
NEUTROPHILS # BLD AUTO: 6.7 THOUSANDS/ΜL (ref 1.85–7.62)
NEUTS SEG NFR BLD AUTO: 64 % (ref 43–75)
NITRITE UR QL STRIP: NEGATIVE
NON-SQ EPI CELLS URNS QL MICRO: ABNORMAL /HPF
NRBC BLD AUTO-RTO: 0 /100 WBCS
PH UR STRIP.AUTO: 5.5 [PH]
PLATELET # BLD AUTO: 337 THOUSANDS/UL (ref 149–390)
PMV BLD AUTO: 8.4 FL (ref 8.9–12.7)
PROT UR STRIP-MCNC: NEGATIVE MG/DL
RBC # BLD AUTO: 5.1 MILLION/UL (ref 3.81–5.12)
RBC #/AREA URNS AUTO: ABNORMAL /HPF
RH BLD: POSITIVE
RUBV IGG SERPL IA-ACNC: <10 IU/ML
SP GR UR STRIP.AUTO: 1.01 (ref 1–1.03)
SPECIMEN EXPIRATION DATE: NORMAL
TREPONEMA PALLIDUM IGG+IGM AB [PRESENCE] IN SERUM OR PLASMA BY IMMUNOASSAY: NORMAL
UROBILINOGEN UR STRIP-ACNC: <2 MG/DL
WBC # BLD AUTO: 10.19 THOUSAND/UL (ref 4.31–10.16)
WBC #/AREA URNS AUTO: ABNORMAL /HPF

## 2024-10-29 PROCEDURE — 87340 HEPATITIS B SURFACE AG IA: CPT

## 2024-10-29 PROCEDURE — 99283 EMERGENCY DEPT VISIT LOW MDM: CPT

## 2024-10-29 PROCEDURE — 86803 HEPATITIS C AB TEST: CPT

## 2024-10-29 PROCEDURE — 86850 RBC ANTIBODY SCREEN: CPT

## 2024-10-29 PROCEDURE — 86762 RUBELLA ANTIBODY: CPT

## 2024-10-29 PROCEDURE — 87389 HIV-1 AG W/HIV-1&-2 AB AG IA: CPT

## 2024-10-29 PROCEDURE — 82950 GLUCOSE TEST: CPT

## 2024-10-29 PROCEDURE — 36415 COLL VENOUS BLD VENIPUNCTURE: CPT

## 2024-10-29 PROCEDURE — 86780 TREPONEMA PALLIDUM: CPT

## 2024-10-29 PROCEDURE — 86901 BLOOD TYPING SEROLOGIC RH(D): CPT

## 2024-10-29 PROCEDURE — 86900 BLOOD TYPING SEROLOGIC ABO: CPT

## 2024-10-29 PROCEDURE — 81001 URINALYSIS AUTO W/SCOPE: CPT

## 2024-10-29 PROCEDURE — 85025 COMPLETE CBC W/AUTO DIFF WBC: CPT

## 2024-10-29 PROCEDURE — 87086 URINE CULTURE/COLONY COUNT: CPT

## 2024-10-29 NOTE — TELEPHONE ENCOUNTER
Called and spoke with patient, reviewed blood presures with patient from 10/25 (136/78) and 10/27 (103/59). Informed pt both reading are within normal range. If she would like to track her BP readings at home, she may do so and bring into office at next appt for us to review. She is agreeable to this.     Also questioned if fetal HR fluctuations are normal. Pt states one hospital visit it was in the 150s, the next hospital, it was in the 160s. Advised both are normal and reassuring heart rates for a fetus. Pt verbalized understanding.     In regards to further follow up for dog jumping on belly, I read message per [facility] stating at 11 weeks gestation, minimal chance of it effecting pregnancy.    Pt did not have any further questions, happy with phone call. Confirmed appt for 11/7 with Dr. Raphael.

## 2024-10-29 NOTE — TELEPHONE ENCOUNTER
Patient called received labs work results via [employer] and had questions. I did inform patient that we asked to give providers at least 48-72hr to interpret labs results. Patient understands and will await a call back to go over labs results.

## 2024-10-29 NOTE — ED PROVIDER NOTES
Time reflects when diagnosis was documented in both MDM as applicable and the Disposition within this note       Time User Action Codes Description Comment    10/29/2024  3:06 PM Joseline Mcpherson Add [Z01.30] Blood pressure check           ED Disposition       ED Disposition   Discharge    Condition   Stable    Date/Time   Tue Oct 29, 2024  3:06 PM    Comment   Araceli Levy discharge to home/self care.                   Assessment & Plan       Medical Decision Making  Patient is a 34-year-old female presenting for blood pressure and fetal heart rate check. Blood pressure initially 122/83 in triage and rechecked and was 129/84. Bedside ultrasound performed showing active fetal movement and a heart rate of 155. Discussed with patient that she should follow-up with her OBGYN for further management and monitoring of blood pressure. Strict return precautions reviewed. Patient verbalizes understanding of discharge instructions and follow-up care at this time.              Medications - No data to display    ED Risk Strat Scores                                               History of Present Illness       Chief Complaint   Patient presents with    Medical Problem     Pt reports she is approx 11 weeks pregnant. . Was noted to be hypertensive during labs done and was instructed by OB to come get an accurate reading in the ED. Pt denies symptoms currently.        History reviewed. No pertinent past medical history.   Past Surgical History:   Procedure Laterality Date    OTHER SURGICAL HISTORY Right     Arm Fractured bone at 6 y.o      Family History   Problem Relation Age of Onset    Thyroid disease Mother     Other Mother         Tachycardia    Heart attack Father         Pacemaker    Diabetes Father         T2DM    No Known Problems Sister     Other Sister         Tachycardia due to anxiety    No Known Problems Sister     Diabetes Maternal Grandmother     Stroke Maternal Grandmother     No Known Problems Paternal  Grandmother     Diabetes Paternal Grandfather     Breast cancer Neg Hx     Colon cancer Neg Hx     Ovarian cancer Neg Hx       Social History     Tobacco Use    Smoking status: Never    Smokeless tobacco: Never   Vaping Use    Vaping status: Never Used   Substance Use Topics    Alcohol use: Never    Drug use: Never      E-Cigarette/Vaping    E-Cigarette Use Never User       E-Cigarette/Vaping Substances    Nicotine No     THC No     CBD No     Flavoring No       I have reviewed and agree with the history as documented.     Patient is a 34-year-old female with a past medical history including migraines. Currently pregnant, . Presents today for evaluation of blood pressure. Patient states that she was getting blood work done for her OBGYN when they noted that her blood pressure was elevated. She was referred to the ED to have it rechecked as it was questionable if it was accurate. Patient also states that her OBGYN was requesting fetal heart rate to be checked as it was not present in note from ED a few days prior. Patient denies any symptoms including headache, lightheadedness, or palpitations.       Medical Problem  Associated symptoms: no abdominal pain, no chest pain, no fever, no headaches and no shortness of breath        Review of Systems   Constitutional:  Negative for chills and fever.   Respiratory:  Negative for shortness of breath.    Cardiovascular:  Negative for chest pain and palpitations.   Gastrointestinal:  Negative for abdominal pain.   Neurological:  Negative for dizziness, light-headedness and headaches.   All other systems reviewed and are negative.          Objective       ED Triage Vitals [10/29/24 1309]   Temperature Pulse Blood Pressure Respirations SpO2 Patient Position - Orthostatic VS   98.3 °F (36.8 °C) 102 122/83 16 99 % Sitting      Temp Source Heart Rate Source BP Location FiO2 (%) Pain Score    Oral Monitor Left arm -- --      Vitals      Date and Time Temp Pulse SpO2 Resp BP Pain  Score FACES Pain Rating User   10/29/24 1400 98.5 °F (36.9 °C) 105 96 % 18 129/84 -- -- LA   10/29/24 1309 98.3 °F (36.8 °C) 102 99 % 16 122/83 -- -- LF            Physical Exam  Vitals and nursing note reviewed.   Constitutional:       Appearance: Normal appearance.   HENT:      Head: Normocephalic and atraumatic.   Cardiovascular:      Heart sounds: Normal heart sounds.   Pulmonary:      Effort: Pulmonary effort is normal.      Breath sounds: Normal breath sounds.   Musculoskeletal:         General: Normal range of motion.   Skin:     General: Skin is warm and dry.      Capillary Refill: Capillary refill takes less than 2 seconds.   Neurological:      General: No focal deficit present.      Mental Status: She is alert and oriented to person, place, and time.   Psychiatric:         Mood and Affect: Mood normal.         Behavior: Behavior normal.         Results Reviewed       None            No orders to display       Procedures    ED Medication and Procedure Management   Prior to Admission Medications   Prescriptions Last Dose Informant Patient Reported? Taking?   Lidocaine 4 % PTCH   No No   Sig: Apply 1 patch topically daily as needed (left shoulder pain) for up to 5 days   famotidine (PEPCID) 20 mg tablet   No No   Sig: Take 1 tablet (20 mg total) by mouth 2 (two) times a day   metoclopramide (Reglan) 10 mg tablet   No No   Sig: Take 1 tablet (10 mg total) by mouth every 6 (six) hours as needed (nausea/vomiting)   Patient not taking: Reported on 10/21/2024   ondansetron (ZOFRAN-ODT) 4 mg disintegrating tablet   No No   Sig: Take 1 tablet (4 mg total) by mouth every 6 (six) hours as needed for nausea or vomiting      Facility-Administered Medications: None     Discharge Medication List as of 10/29/2024  3:12 PM        CONTINUE these medications which have NOT CHANGED    Details   famotidine (PEPCID) 20 mg tablet Take 1 tablet (20 mg total) by mouth 2 (two) times a day, Starting Fri 10/25/2024, Normal       Lidocaine 4 % PTCH Apply 1 patch topically daily as needed (left shoulder pain) for up to 5 days, Starting Fri 10/25/2024, Until Wed 10/30/2024 at 2359, Normal      metoclopramide (Reglan) 10 mg tablet Take 1 tablet (10 mg total) by mouth every 6 (six) hours as needed (nausea/vomiting), Starting Tue 10/8/2024, Normal      ondansetron (ZOFRAN-ODT) 4 mg disintegrating tablet Take 1 tablet (4 mg total) by mouth every 6 (six) hours as needed for nausea or vomiting, Starting Fri 10/25/2024, Normal           No discharge procedures on file.  ED SEPSIS DOCUMENTATION   Time reflects when diagnosis was documented in both MDM as applicable and the Disposition within this note       Time User Action Codes Description Comment    10/29/2024  3:06 PM Joseline Mcpherson Add [Z01.30] Blood pressure check                  STEW Key  10/30/24 2052

## 2024-10-29 NOTE — DISCHARGE INSTRUCTIONS
Fetal heart uzsl=[id number]. Follow-up with OBGYN and return to ED for any lightheadedness, headaches or visual changes

## 2024-10-30 ENCOUNTER — TELEPHONE (OUTPATIENT)
Age: 34
End: 2024-10-30

## 2024-10-30 DIAGNOSIS — O99.810 ABNORMAL GLUCOSE AFFECTING PREGNANCY: Primary | ICD-10-CM

## 2024-10-30 LAB — BACTERIA UR CULT: NORMAL

## 2024-10-30 NOTE — TELEPHONE ENCOUNTER
----- Message from Delisa Pérez MD sent at 10/30/2024 12:00 PM EDT -----  Please call the patient regarding her abnormal result. Will need 3hr gtt, order placed. Also not immune to rubella, please let her know we should give her a repeat vaccine pp and to avoid people who might have it while pregnant, as this can lead to birth defects

## 2024-10-30 NOTE — TELEPHONE ENCOUNTER
Cystic fibrosis, SMA, and urine culture still pending. Patient aware that provider has at least 48-72 hours to review lab results.

## 2024-11-05 ENCOUNTER — HOSPITAL ENCOUNTER (EMERGENCY)
Facility: HOSPITAL | Age: 34
Discharge: HOME | End: 2024-11-05
Attending: EMERGENCY MEDICINE | Admitting: EMERGENCY MEDICINE
Payer: COMMERCIAL

## 2024-11-05 VITALS
BODY MASS INDEX: 41.46 KG/M2 | OXYGEN SATURATION: 100 % | HEART RATE: 114 BPM | WEIGHT: 249.12 LBS | SYSTOLIC BLOOD PRESSURE: 149 MMHG | DIASTOLIC BLOOD PRESSURE: 80 MMHG | RESPIRATION RATE: 20 BRPM | TEMPERATURE: 97.5 F

## 2024-11-05 DIAGNOSIS — J02.9 PHARYNGITIS: ICD-10-CM

## 2024-11-05 DIAGNOSIS — O21.9 NAUSEA AND VOMITING IN PREGNANCY: Primary | ICD-10-CM

## 2024-11-05 LAB
ALBUMIN SERPL BCG-MCNC: 3.8 G/DL (ref 3.5–5)
ALP SERPL-CCNC: 37 U/L (ref 34–104)
ALT SERPL W P-5'-P-CCNC: 14 U/L (ref 7–52)
ANION GAP SERPL CALCULATED.3IONS-SCNC: 9 MMOL/L (ref 4–13)
AST SERPL W P-5'-P-CCNC: 16 U/L (ref 13–39)
B-HCG SERPL-ACNC: ABNORMAL MIU/ML (ref 0–5)
BACTERIA UR QL AUTO: ABNORMAL /HPF
BASOPHILS # BLD AUTO: 0.06 THOUSANDS/ΜL (ref 0–0.1)
BASOPHILS NFR BLD AUTO: 1 % (ref 0–1)
BILIRUB SERPL-MCNC: 0.23 MG/DL (ref 0.2–1)
BILIRUB UR QL STRIP: NEGATIVE
BILIRUB UR QL STRIP: NEGATIVE
BUN SERPL-MCNC: 9 MG/DL (ref 5–25)
CALCIUM SERPL-MCNC: 9.3 MG/DL (ref 8.4–10.2)
CAOX CRY URNS QL MICRO: ABNORMAL /HPF
CHLORIDE SERPL-SCNC: 103 MMOL/L (ref 96–108)
CLARITY UR: ABNORMAL
CLARITY UR: CLEAR
CO2 SERPL-SCNC: 22 MMOL/L (ref 21–32)
COLOR UR: COLORLESS
COLOR UR: YELLOW
CREAT SERPL-MCNC: 0.47 MG/DL (ref 0.6–1.3)
EOSINOPHIL # BLD AUTO: 0.47 THOUSAND/ΜL (ref 0–0.61)
EOSINOPHIL NFR BLD AUTO: 5 % (ref 0–6)
ERYTHROCYTE [DISTWIDTH] IN BLOOD BY AUTOMATED COUNT: 12.8 % (ref 11.6–15.1)
FLUAV AG UPPER RESP QL IA.RAPID: NEGATIVE
FLUBV AG UPPER RESP QL IA.RAPID: NEGATIVE
GFR SERPL CREATININE-BSD FRML MDRD: 130 ML/MIN/1.73SQ M
GLUCOSE SERPL-MCNC: 123 MG/DL (ref 65–140)
GLUCOSE UR STRIP-MCNC: NEGATIVE MG/DL
GLUCOSE UR STRIP-MCNC: NEGATIVE MG/DL
HCT VFR BLD AUTO: 39.7 % (ref 34.8–46.1)
HGB BLD-MCNC: 13 G/DL (ref 11.5–15.4)
HGB UR QL STRIP.AUTO: NEGATIVE
HGB UR QL STRIP.AUTO: NEGATIVE
IMM GRANULOCYTES # BLD AUTO: 0.03 THOUSAND/UL (ref 0–0.2)
IMM GRANULOCYTES NFR BLD AUTO: 0 % (ref 0–2)
KETONES UR STRIP-MCNC: ABNORMAL MG/DL
KETONES UR STRIP-MCNC: NEGATIVE MG/DL
LEUKOCYTE ESTERASE UR QL STRIP: NEGATIVE
LEUKOCYTE ESTERASE UR QL STRIP: NEGATIVE
LYMPHOCYTES # BLD AUTO: 1.54 THOUSANDS/ΜL (ref 0.6–4.47)
LYMPHOCYTES NFR BLD AUTO: 18 % (ref 14–44)
MCH RBC QN AUTO: 26.6 PG (ref 26.8–34.3)
MCHC RBC AUTO-ENTMCNC: 32.7 G/DL (ref 31.4–37.4)
MCV RBC AUTO: 81 FL (ref 82–98)
MONOCYTES # BLD AUTO: 0.64 THOUSAND/ΜL (ref 0.17–1.22)
MONOCYTES NFR BLD AUTO: 7 % (ref 4–12)
MUCOUS THREADS UR QL AUTO: ABNORMAL
NEUTROPHILS # BLD AUTO: 6.01 THOUSANDS/ΜL (ref 1.85–7.62)
NEUTS SEG NFR BLD AUTO: 69 % (ref 43–75)
NITRITE UR QL STRIP: NEGATIVE
NITRITE UR QL STRIP: NEGATIVE
NON-SQ EPI CELLS URNS QL MICRO: ABNORMAL /HPF
NRBC BLD AUTO-RTO: 0 /100 WBCS
PH UR STRIP.AUTO: 5.5 [PH]
PH UR STRIP.AUTO: 5.5 [PH]
PLATELET # BLD AUTO: 308 THOUSANDS/UL (ref 149–390)
PMV BLD AUTO: 8.3 FL (ref 8.9–12.7)
POTASSIUM SERPL-SCNC: 3.6 MMOL/L (ref 3.5–5.3)
PROT SERPL-MCNC: 7.2 G/DL (ref 6.4–8.4)
PROT UR STRIP-MCNC: ABNORMAL MG/DL
PROT UR STRIP-MCNC: NEGATIVE MG/DL
RBC # BLD AUTO: 4.88 MILLION/UL (ref 3.81–5.12)
RBC #/AREA URNS AUTO: ABNORMAL /HPF
S PYO DNA THROAT QL NAA+PROBE: NOT DETECTED
SARS-COV+SARS-COV-2 AG RESP QL IA.RAPID: NEGATIVE
SODIUM SERPL-SCNC: 134 MMOL/L (ref 135–147)
SP GR UR STRIP.AUTO: 1 (ref 1–1.03)
SP GR UR STRIP.AUTO: 1.03 (ref 1–1.03)
UROBILINOGEN UR STRIP-ACNC: <2 MG/DL
UROBILINOGEN UR STRIP-ACNC: <2 MG/DL
WBC # BLD AUTO: 8.75 THOUSAND/UL (ref 4.31–10.16)
WBC #/AREA URNS AUTO: ABNORMAL /HPF

## 2024-11-05 PROCEDURE — 85025 COMPLETE CBC W/AUTO DIFF WBC: CPT | Performed by: EMERGENCY MEDICINE

## 2024-11-05 PROCEDURE — 87804 INFLUENZA ASSAY W/OPTIC: CPT | Performed by: EMERGENCY MEDICINE

## 2024-11-05 PROCEDURE — 81001 URINALYSIS AUTO W/SCOPE: CPT | Performed by: EMERGENCY MEDICINE

## 2024-11-05 PROCEDURE — 87086 URINE CULTURE/COLONY COUNT: CPT | Performed by: EMERGENCY MEDICINE

## 2024-11-05 PROCEDURE — 36415 COLL VENOUS BLD VENIPUNCTURE: CPT | Performed by: EMERGENCY MEDICINE

## 2024-11-05 PROCEDURE — 80053 COMPREHEN METABOLIC PANEL: CPT | Performed by: EMERGENCY MEDICINE

## 2024-11-05 PROCEDURE — 81003 URINALYSIS AUTO W/O SCOPE: CPT | Performed by: EMERGENCY MEDICINE

## 2024-11-05 PROCEDURE — 87811 SARS-COV-2 COVID19 W/OPTIC: CPT | Performed by: EMERGENCY MEDICINE

## 2024-11-05 PROCEDURE — 99284 EMERGENCY DEPT VISIT MOD MDM: CPT | Performed by: EMERGENCY MEDICINE

## 2024-11-05 PROCEDURE — 99284 EMERGENCY DEPT VISIT MOD MDM: CPT

## 2024-11-05 PROCEDURE — 87651 STREP A DNA AMP PROBE: CPT | Performed by: EMERGENCY MEDICINE

## 2024-11-05 PROCEDURE — 96360 HYDRATION IV INFUSION INIT: CPT

## 2024-11-05 PROCEDURE — 84702 CHORIONIC GONADOTROPIN TEST: CPT | Performed by: EMERGENCY MEDICINE

## 2024-11-05 RX ORDER — PYRIDOXINE HCL (VITAMIN B6) 50 MG
25 TABLET ORAL ONCE
Status: COMPLETED | OUTPATIENT
Start: 2024-11-05 | End: 2024-11-05

## 2024-11-05 RX ORDER — DOXYLAMINE SUCCINATE AND PYRIDOXINE HYDROCHLORIDE, DELAYED RELEASE TABLETS 10 MG/10 MG 10; 10 MG/1; MG/1
TABLET, DELAYED RELEASE ORAL
Qty: 30 TABLET | Refills: 0 | Status: SHIPPED | OUTPATIENT
Start: 2024-11-05

## 2024-11-05 RX ADMIN — DOXYLAMINE SUCCINATE 25 MG: 25 TABLET ORAL at 21:12

## 2024-11-05 RX ADMIN — SODIUM CHLORIDE 1000 ML: 0.9 INJECTION, SOLUTION INTRAVENOUS at 21:32

## 2024-11-05 RX ADMIN — PYRIDOXINE HCL TAB 50 MG 25 MG: 50 TAB at 21:11

## 2024-11-06 NOTE — ED PROVIDER NOTES
Time reflects when diagnosis was documented in both MDM as applicable and the Disposition within this note       Time User Action Codes Description Comment    2024 10:23 PM Martin Arguello [O21.9] Nausea and vomiting in pregnancy     2024 10:23 PM Martin Arguello [J02.9] Pharyngitis           ED Disposition       ED Disposition   Discharge    Condition   Stable    Date/Time    10:23 PM    Comment   Araceli Levy discharge to home/self care.                   Assessment & Plan       Medical Decision Making  34-year-old  at 12 weeks gestation female presents the emergency room with multiple complaints.    Patient notes nasal congestion and burning more prominently on the left side than the right for the past few days along with the development of pharyngitis.  Patient denies any cough.  Patient denies any fever and is afebrile upon arrival to the emergency room.    Patient came to the emergency room due to repeated episodes of vomiting and difficulty maintaining oral intake along with darker than normal urine.  Patient does note intermittent episodes of cramping over the past few days in the lower abdomen but denies any persistent abdominal pain.  Patient denies any diarrhea.  Patient denies any vaginal bleeding or discharge.    Patient denies any falls or trauma.    Impression and plan: Multiple symptoms with a broad differential.  Based on patient's history and physical, concerns for potential infectious etiologies of COVID and influenza testing will be sent.  Patient without typical signs of streptococcal pharyngitis though considering she is pregnant, will send strep PCR testing.  Patient does note intermittent abdominal pain, fetal heart rate 159 on bedside ultrasound with appropriate movement.  Patient is hypertensive though considering the gestational age of pregnancy is more likely to represent maternal hypertension though I discussed the need for follow-up with OB/GYN to  monitor this closely.  Patient denies any vaginal bleeding that would warrant testing for RhoGAM use.  Will treat patient symptomatically while monitoring and reassessing.      Amount and/or Complexity of Data Reviewed  Labs: ordered.    Risk  OTC drugs.  Prescription drug management.        ED Course as of 11/06/24 0406   Tue Nov 05, 2024 2254 Discussed findings with the patient.  Quantitative hCG obtained though I discussed the utility of this is not significant unless OB/GYN wants to trend this which is unlikely.  Initial urine was contaminated so repeat was obtained that demonstrated no acute findings and improvement from the prior terms of dehydration.  Patient tolerating oral intake.  Initial urine did demonstrate calcium oxalate crystals but no signs of kidney stone on today's exam that she did not recent flank pain, this resolved.  I discussed these findings with the patient.  Will provide prescription for Diclegis though I discussed can use over-the-counter doxylamine and pyridoxine.  Discussed close follow-up with OB/GYN return to the emergency medical question worsening symptoms.       Medications   doxylamine (UNISOM) tablet 25 mg (25 mg Oral Given 11/5/24 2112)   pyridoxine (VITAMIN B6) tablet 25 mg (25 mg Oral Given 11/5/24 2111)   sodium chloride 0.9 % bolus 1,000 mL (0 mL Intravenous Stopped 11/5/24 2230)       ED Risk Strat Scores                           SBIRT 22yo+      Flowsheet Row Most Recent Value   Initial Alcohol Screen: US AUDIT-C     1. How often do you have a drink containing alcohol? 0 Filed at: 11/05/2024 2054   2. How many drinks containing alcohol do you have on a typical day you are drinking?  0 Filed at: 11/05/2024 2054   3b. FEMALE Any Age, or MALE 65+: How often do you have 4 or more drinks on one occassion? 0 Filed at: 11/05/2024 2054   Audit-C Score 0 Filed at: 11/05/2024 2054   MAR: How many times in the past year have you...    Used an illegal drug or used a prescription  medication for non-medical reasons? Never Filed at: 11/05/2024 2054                            History of Present Illness       Chief Complaint   Patient presents with    Vomiting During Pregnancy     Pt presents to ER from home for reports of worsening vomiting during pregnancy, estimated 12 weeks 6 days. Now reports throat burning, sinus burning. Reporting lower abd cramps x 2-3 days.        History reviewed. No pertinent past medical history.   Past Surgical History:   Procedure Laterality Date    OTHER SURGICAL HISTORY Right     Arm Fractured bone at 6 y.o      Family History   Problem Relation Age of Onset    Thyroid disease Mother     Other Mother         Tachycardia    Heart attack Father         Pacemaker    Diabetes Father         T2DM    No Known Problems Sister     Other Sister         Tachycardia due to anxiety    No Known Problems Sister     Diabetes Maternal Grandmother     Stroke Maternal Grandmother     No Known Problems Paternal Grandmother     Diabetes Paternal Grandfather     Breast cancer Neg Hx     Colon cancer Neg Hx     Ovarian cancer Neg Hx       Social History     Tobacco Use    Smoking status: Never    Smokeless tobacco: Never   Vaping Use    Vaping status: Never Used   Substance Use Topics    Alcohol use: Never    Drug use: Never      E-Cigarette/Vaping    E-Cigarette Use Never User       E-Cigarette/Vaping Substances    Nicotine No     THC No     CBD No     Flavoring No       I have reviewed and agree with the history as documented.     HPI    Review of Systems        Objective       ED Triage Vitals [11/05/24 2053]   Temperature Pulse Blood Pressure Respirations SpO2 Patient Position - Orthostatic VS   97.5 °F (36.4 °C) (!) 114 149/80 20 100 % Sitting      Temp Source Heart Rate Source BP Location FiO2 (%) Pain Score    Tympanic Monitor Left arm -- 6      Vitals      Date and Time Temp Pulse SpO2 Resp BP Pain Score FACES Pain Rating User   11/05/24 2053 -- -- -- -- -- 6 -- AM   11/05/24  2053 97.5 °F (36.4 °C) 114 100 % 20 149/80 -- -- LA            Physical Exam  HENT:      Head: Normocephalic.      Mouth/Throat:      Pharynx: Oropharynx is clear. No oropharyngeal exudate or posterior oropharyngeal erythema.   Eyes:      Conjunctiva/sclera: Conjunctivae normal.      Pupils: Pupils are equal, round, and reactive to light.   Cardiovascular:      Rate and Rhythm: Regular rhythm. Tachycardia present.   Pulmonary:      Effort: Pulmonary effort is normal.   Abdominal:      Palpations: Abdomen is soft.      Tenderness: There is no abdominal tenderness. There is no guarding or rebound.   Skin:     General: Skin is warm and dry.   Neurological:      General: No focal deficit present.      Mental Status: She is alert.      Gait: Gait normal.         Results Reviewed       Procedure Component Value Units Date/Time    UA w Reflex to Microscopic w Reflex to Culture [[id number]]  (Abnormal) Collected: 11/05/24 2225    Lab Status: Final result Specimen: Urine, Clean Catch Updated: 11/05/24 2234     Color, UA Colorless     Clarity, UA Clear     Specific Gravity, UA 1.002     pH, UA 5.5     Leukocytes, UA Negative     Nitrite, UA Negative     Protein, UA Negative mg/dl      Glucose, UA Negative mg/dl      Ketones, UA Negative mg/dl      Urobilinogen, UA <2.0 mg/dl      Bilirubin, UA Negative     Occult Blood, UA Negative     URINE COMMENT --    Urine culture [[id number]] Collected: 11/05/24 2225    Lab Status: In process Specimen: Urine, Clean Catch Updated: 11/05/24 2234    hCG, quantitative [[id number]]  (Abnormal) Collected: 11/05/24 2129    Lab Status: Final result Specimen: Blood from Arm, Right Updated: 11/05/24 2231     HCG, Quant 112,258.5 mIU/mL     Narrative:       Expected Ranges:    HCG results between 5.0 and 25.0 mIU/mL may be indicative of early pregnancy but should be interpreted in light of the total clinical presentation.    HCG can rise to detectable levels in daniel and post menopausal women (0-11.6  mIU/mL).     Approximate               Approximate HCG  Gestation age          Concentration ( mIU/mL)  _____________          ______________________   Weeks                      HCG values  0.2-1                       5-50  1-2                           2-3                         100-5000  3-4                         500-69594  4-5                         1000-42999  5-6                         [id number]  6-8                         [id number]  8-12                        [id number]      Strep A PCR [[id number]]  (Normal) Collected: 11/05/24 2129    Lab Status: Final result Specimen: Throat Updated: 11/05/24 2219     STREP A PCR Not Detected    Comprehensive metabolic panel [[id number]]  (Abnormal) Collected: 11/05/24 2129    Lab Status: Final result Specimen: Blood from Arm, Right Updated: 11/05/24 2200     Sodium 134 mmol/L      Potassium 3.6 mmol/L      Chloride 103 mmol/L      CO2 22 mmol/L      ANION GAP 9 mmol/L      BUN 9 mg/dL      Creatinine 0.47 mg/dL      Glucose 123 mg/dL      Calcium 9.3 mg/dL      AST 16 U/L      ALT 14 U/L      Alkaline Phosphatase 37 U/L      Total Protein 7.2 g/dL      Albumin 3.8 g/dL      Total Bilirubin 0.23 mg/dL      eGFR 130 ml/min/1.73sq m     Narrative:      National Kid[employer] Disease Foundation guidelines for Chronic Kidney Disease (CKD):     Stage 1 with normal or high GFR (GFR > 90 mL/min/1.73 square meters)    Stage 2 Mild CKD (GFR = 60-89 mL/min/1.73 square meters)    Stage 3A Moderate CKD (GFR = 45-59 mL/min/1.73 square meters)    Stage 3B Moderate CKD (GFR = 30-44 mL/min/1.73 square meters)    Stage 4 Severe CKD (GFR = 15-29 mL/min/1.73 square meters)    Stage 5 End Stage CKD (GFR <15 mL/min/1.73 square meters)  Note: GFR calculation is accurate only with a steady state creatinine    FLU/COVID Rapid Antigen (30 min. TAT) - Preferred screening test in ED [[id number]]  (Normal) Collected: 11/05/24 2129    Lab Status: Final result Specimen: Nares from Nose  Updated: 11/05/24 2150     SARS COV Rapid Antigen Negative     Influenza A Rapid Antigen Negative     Influenza B Rapid Antigen Negative    Narrative:      This test has been performed using the [employer] Far[employer] 2 FLU+SARS Antigen test under the Emergency Use Authorization (EUA). This test has been validated by the  and verified by the performing laboratory. The Farida uses lateral flow immunofluorescent sandwich assay to detect SARS-COV, Influenza A and Influenza B Antigen.     The [employer] Farida 2 SARS Antigen test does not differentiate between SARS-CoV and SARS-CoV-2.     Negative results are presumptive and may be confirmed with a molecular assay, if necessary, for patient management. Negative results do not rule out SARS-CoV-2 or influenza infection and should not be used as the sole basis for treatment or patient management decisions. A negative test result may occur if the level of antigen in a sample is below the limit of detection of this test.     Positive results are indicative of the presence of viral antigens, but do not rule out bacterial infection or co-infection with other viruses.     All test results should be used as an adjunct to clinical observations and other information available to the provider.    FOR PEDIATRIC PATIENTS - copy/paste COVID Guidelines URL to browser: [email or web address]    CBC and differential [[id number]]  (Abnormal) Collected: 11/05/24 2129    Lab Status: Final result Specimen: Blood from Arm, Right Updated: 11/05/24 2139     WBC 8.75 Thousand/uL      RBC 4.88 Million/uL      Hemoglobin 13.0 g/dL      Hematocrit 39.7 %      MCV 81 fL      MCH 26.6 pg      MCHC 32.7 g/dL      RDW 12.8 %      MPV 8.3 fL      Platelets 308 Thousands/uL      nRBC 0 /100 WBCs      Segmented % 69 %      Immature Grans % 0 %      Lymphocytes % 18 %      Monocytes % 7 %      Eosinophils Relative 5 %      Basophils Relative 1 %      Absolute  Neutrophils 6.01 Thousands/µL      Absolute Immature Grans 0.03 Thousand/uL      Absolute Lymphocytes 1.54 Thousands/µL      Absolute Monocytes 0.64 Thousand/µL      Eosinophils Absolute 0.47 Thousand/µL      Basophils Absolute 0.06 Thousands/µL     Urine Microscopic [[id number]]  (Abnormal) Collected: 11/05/24 2122    Lab Status: Final result Specimen: Urine, Clean Catch Updated: 11/05/24 2135     RBC, UA 2-4 /hpf      WBC, UA 2-4 /hpf      Epithelial Cells Moderate /hpf      Bacteria, UA Occasional /hpf      MUCUS THREADS Innumerable     Ca Oxalate [place], UA Innumerable /hpf      URINE COMMENT --    UA w Reflex to Microscopic w Reflex to Culture [[id number]]  (Abnormal) Collected: 11/05/24 2122    Lab Status: Final result Specimen: Urine, Clean Catch Updated: 11/05/24 2132     Color, UA Yellow     Clarity, UA Turbid     Specific Gravity, UA 1.033     pH, UA 5.5     Leukocytes, UA Negative     Nitrite, UA Negative     Protein, UA 30 (1+) mg/dl      Glucose, UA Negative mg/dl      Ketones, UA 20 (1+) mg/dl      Urobilinogen, UA <2.0 mg/dl      Bilirubin, UA Negative     Occult Blood, UA Negative     URINE COMMENT --    Urine culture [[id number]] Collected: 11/05/24 2122    Lab Status: In process Specimen: Urine, Clean Catch Updated: 11/05/24 2132            No orders to display       Procedures    ED Medication and Procedure Management   Prior to Admission Medications   Prescriptions Last Dose Informant Patient Reported? Taking?   Lidocaine 4 % PTCH   No No   Sig: Apply 1 patch topically daily as needed (left shoulder pain) for up to 5 days   famotidine (PEPCID) 20 mg tablet   No No   Sig: Take 1 tablet (20 mg total) by mouth 2 (two) times a day   metoclopramide (Reglan) 10 mg tablet   No No   Sig: Take 1 tablet (10 mg total) by mouth every 6 (six) hours as needed (nausea/vomiting)   Patient not taking: Reported on 10/21/2024   ondansetron (ZOFRAN-ODT) 4 mg disintegrating tablet   No No   Sig: Take 1 tablet (4 mg total)  by mouth every 6 (six) hours as needed for nausea or vomiting      Facility-Administered Medications: None     Discharge Medication List as of 11/5/2024 10:27 PM        START taking these medications    Details   Doxylamine-Pyridoxine (Diclegis) 10-10 MG TBEC 2 delayed-release tablets PO on a daily basis at bedtime  If symptoms not adequately controlled, increase dose to 4 tablets each day (1 tab in AM, 1 tab mid-afternoon, and 2 tabs at bedtime), Print           CONTINUE these medications which have NOT CHANGED    Details   famotidine (PEPCID) 20 mg tablet Take 1 tablet (20 mg total) by mouth 2 (two) times a day, Starting Fri 10/25/2024, Normal      Lidocaine 4 % PTCH Apply 1 patch topically daily as needed (left shoulder pain) for up to 5 days, Starting Fri 10/25/2024, Until Wed 10/30/2024 at 2359, Normal      metoclopramide (Reglan) 10 mg tablet Take 1 tablet (10 mg total) by mouth every 6 (six) hours as needed (nausea/vomiting), Starting Tue 10/8/2024, Normal      ondansetron (ZOFRAN-ODT) 4 mg disintegrating tablet Take 1 tablet (4 mg total) by mouth every 6 (six) hours as needed for nausea or vomiting, Starting Fri 10/25/2024, Normal           No discharge procedures on file.  ED SEPSIS DOCUMENTATION   Time reflects when diagnosis was documented in both MDM as applicable and the Disposition within this note       Time User Action Codes Description Comment    11/5/2024 10:23 PM Martin Arguello [O21.9] Nausea and vomiting in pregnancy     11/5/2024 10:23 PM Martin Arguello [J02.9] Pharyngitis                  Martin Arguello MD  11/06/24 0796

## 2024-11-07 ENCOUNTER — TELEPHONE (OUTPATIENT)
Age: 34
End: 2024-11-07

## 2024-11-07 ENCOUNTER — APPOINTMENT (EMERGENCY)
Dept: ULTRASOUND IMAGING | Facility: HOSPITAL | Age: 34
End: 2024-11-07
Payer: COMMERCIAL

## 2024-11-07 ENCOUNTER — HOSPITAL ENCOUNTER (EMERGENCY)
Facility: HOSPITAL | Age: 34
Discharge: HOME | End: 2024-11-07
Attending: EMERGENCY MEDICINE
Payer: COMMERCIAL

## 2024-11-07 VITALS
HEART RATE: 81 BPM | RESPIRATION RATE: 20 BRPM | TEMPERATURE: 98.4 F | DIASTOLIC BLOOD PRESSURE: 58 MMHG | OXYGEN SATURATION: 97 % | SYSTOLIC BLOOD PRESSURE: 112 MMHG

## 2024-11-07 DIAGNOSIS — R10.31 RIGHT LOWER QUADRANT ABDOMINAL PAIN: Primary | ICD-10-CM

## 2024-11-07 LAB
ALBUMIN SERPL BCG-MCNC: 3.9 G/DL (ref 3.5–5)
ALP SERPL-CCNC: 39 U/L (ref 34–104)
ALT SERPL W P-5'-P-CCNC: 15 U/L (ref 7–52)
ANION GAP SERPL CALCULATED.3IONS-SCNC: 7 MMOL/L (ref 4–13)
AST SERPL W P-5'-P-CCNC: 15 U/L (ref 13–39)
BACTERIA UR CULT: NORMAL
BASOPHILS # BLD AUTO: 0.07 THOUSANDS/ÂΜL (ref 0–0.1)
BASOPHILS NFR BLD AUTO: 1 % (ref 0–1)
BILIRUB SERPL-MCNC: 0.28 MG/DL (ref 0.2–1)
BILIRUB UR QL STRIP: NEGATIVE
BUN SERPL-MCNC: 8 MG/DL (ref 5–25)
CALCIUM SERPL-MCNC: 8.8 MG/DL (ref 8.4–10.2)
CHLORIDE SERPL-SCNC: 103 MMOL/L (ref 96–108)
CLARITY UR: NORMAL
CO2 SERPL-SCNC: 24 MMOL/L (ref 21–32)
COLOR UR: NORMAL
CREAT SERPL-MCNC: 0.55 MG/DL (ref 0.6–1.3)
EOSINOPHIL # BLD AUTO: 0.67 THOUSAND/ÂΜL (ref 0–0.61)
EOSINOPHIL NFR BLD AUTO: 7 % (ref 0–6)
ERYTHROCYTE [DISTWIDTH] IN BLOOD BY AUTOMATED COUNT: 13 % (ref 11.6–15.1)
GFR SERPL CREATININE-BSD FRML MDRD: 123 ML/MIN/1.73SQ M
GLUCOSE SERPL-MCNC: 90 MG/DL (ref 65–140)
GLUCOSE UR STRIP-MCNC: NEGATIVE MG/DL
HCT VFR BLD AUTO: 40.7 % (ref 34.8–46.1)
HGB BLD-MCNC: 13.5 G/DL (ref 11.5–15.4)
HGB UR QL STRIP.AUTO: NEGATIVE
IMM GRANULOCYTES # BLD AUTO: 0.05 THOUSAND/UL (ref 0–0.2)
IMM GRANULOCYTES NFR BLD AUTO: 1 % (ref 0–2)
KETONES UR STRIP-MCNC: NEGATIVE MG/DL
LEUKOCYTE ESTERASE UR QL STRIP: NEGATIVE
LYMPHOCYTES # BLD AUTO: 2.27 THOUSANDS/ÂΜL (ref 0.6–4.47)
LYMPHOCYTES NFR BLD AUTO: 23 % (ref 14–44)
MCH RBC QN AUTO: 27.1 PG (ref 26.8–34.3)
MCHC RBC AUTO-ENTMCNC: 33.2 G/DL (ref 31.4–37.4)
MCV RBC AUTO: 82 FL (ref 82–98)
MONOCYTES # BLD AUTO: 0.74 THOUSAND/ÂΜL (ref 0.17–1.22)
MONOCYTES NFR BLD AUTO: 8 % (ref 4–12)
NEUTROPHILS # BLD AUTO: 6 THOUSANDS/ÂΜL (ref 1.85–7.62)
NEUTS SEG NFR BLD AUTO: 60 % (ref 43–75)
NITRITE UR QL STRIP: NEGATIVE
NRBC BLD AUTO-RTO: 0 /100 WBCS
PH UR STRIP.AUTO: 5.5 [PH]
PLATELET # BLD AUTO: 316 THOUSANDS/UL (ref 149–390)
PMV BLD AUTO: 8.4 FL (ref 8.9–12.7)
POTASSIUM SERPL-SCNC: 3.8 MMOL/L (ref 3.5–5.3)
PROT SERPL-MCNC: 7.4 G/DL (ref 6.4–8.4)
PROT UR STRIP-MCNC: NEGATIVE MG/DL
RBC # BLD AUTO: 4.98 MILLION/UL (ref 3.81–5.12)
SODIUM SERPL-SCNC: 134 MMOL/L (ref 135–147)
SP GR UR STRIP.AUTO: 1.01 (ref 1–1.03)
UROBILINOGEN UR STRIP-ACNC: <2 MG/DL
WBC # BLD AUTO: 9.8 THOUSAND/UL (ref 4.31–10.16)

## 2024-11-07 PROCEDURE — 76815 OB US LIMITED FETUS(S): CPT

## 2024-11-07 PROCEDURE — 85025 COMPLETE CBC W/AUTO DIFF WBC: CPT

## 2024-11-07 PROCEDURE — 81003 URINALYSIS AUTO W/O SCOPE: CPT

## 2024-11-07 PROCEDURE — 36415 COLL VENOUS BLD VENIPUNCTURE: CPT

## 2024-11-07 PROCEDURE — 99284 EMERGENCY DEPT VISIT MOD MDM: CPT | Performed by: EMERGENCY MEDICINE

## 2024-11-07 PROCEDURE — 99284 EMERGENCY DEPT VISIT MOD MDM: CPT

## 2024-11-07 PROCEDURE — 76705 ECHO EXAM OF ABDOMEN: CPT

## 2024-11-07 PROCEDURE — 87086 URINE CULTURE/COLONY COUNT: CPT

## 2024-11-07 PROCEDURE — 80053 COMPREHEN METABOLIC PANEL: CPT

## 2024-11-07 NOTE — ED PROVIDER NOTES
Time reflects when diagnosis was documented in both MDM as applicable and the Disposition within this note       Time User Action Codes Description Comment    11/7/2024  7:57 PM Cam Garcia Add [R10.31] Right lower quadrant abdominal pain           ED Disposition       ED Disposition   Discharge    Condition   Stable    Date/Time   u Nov 7, 2024  7:57 PM    Comment   Araceli Levy discharge to home/self care.                   Assessment & Plan       Medical Decision Making  34-year-old female presenting to the ED with right lower quadrant abdominal pain.  Patient is 13 weeks pregnant.    Concern for appendicitis versus UTI versus torsion versus renal cyst versus ovarian cyst    Will get CBC, CMP, UA as well as ultrasound appendix and ultrasound transvaginally.    See ED course for interpretation of results.    Following ultrasound findings, I discussed with patient about following up with OB/GYN.  Patient was comfortable with this plan will follow-up with OB/GYN in the morning.  Patient stable and pain has improved.  Patient comfortable with discharge at this time.  Patient discharged.    Amount and/or Complexity of Data Reviewed  Labs: ordered. Decision-making details documented in ED Course.  Radiology: ordered.        ED Course as of 11/07/24 2217   Thu Nov 07, 2024   1714 CBC and differential(!)  Nonspecific mildly decreased MPV, nonspecific mildly elevated eosinophils otherwise unremarkable and reassuring CBC.   1714 Comprehensive metabolic panel(!)  Mild decrease in sodium and creatinine otherwise unremarkable and reassuring CMP   1715 UA w Reflex to Microscopic w Reflex to Culture  No signs of UTI.   1955 IMPRESSION:     Single live intrauterine gestation at 12 weeks 5 days based on crown-rump length (range +/- 1 week / 1 day).  AKILAH of May 17, 2025.     No evidence for ovarian torsion.     Workstation performed: [id number]     1956 IMPRESSION:  Although the appendix is not identified, there are  no secondary sonographic findings to suggest acute appendicitis.           Workstation performed: [id number]         Medications - No data to display    ED Risk Strat Scores                           SBIRT 20yo+      Flowsheet Row Most Recent Value   Initial Alcohol Screen: US AUDIT-C     1. How often do you have a drink containing alcohol? 0 Filed at: 11/07/2024 1455   2. How many drinks containing alcohol do you have on a typical day you are drinking?  0 Filed at: 11/07/2024 1455   3a. Male UNDER 65: How often do you have five or more drinks on one occasion? 0 Filed at: 11/07/2024 1455   3b. FEMALE Any Age, or MALE 65+: How often do you have 4 or more drinks on one occassion? 0 Filed at: 11/07/2024 1455   Audit-C Score 0 Filed at: 11/07/2024 1455   MAR: How many times in the past year have you...    Used an illegal drug or used a prescription medication for non-medical reasons? Never Filed at: 11/07/2024 1455                            History of Present Illness       Chief Complaint   Patient presents with    Abdominal Cramping     Patient 12 weeks pregnant, started last night. Had OB/GYN appointment that was canceled today due to provider illness. Went to OB advised patient to come to Ed for evaluation due to pain. Patient has hx of miscarriage 15 years ago.        History reviewed. No pertinent past medical history.   Past Surgical History:   Procedure Laterality Date    OTHER SURGICAL HISTORY Right     Arm Fractured bone at 6 y.o      Family History   Problem Relation Age of Onset    Thyroid disease Mother     Other Mother         Tachycardia    Heart attack Father         Pacemaker    Diabetes Father         T2DM    No Known Problems Sister     Other Sister         Tachycardia due to anxiety    No Known Problems Sister     Diabetes Maternal Grandmother     Stroke Maternal Grandmother     No Known Problems Paternal Grandmother     Diabetes Paternal Grandfather     Breast cancer Neg Hx     Colon cancer Neg Hx      Ovarian cancer Neg Hx       Social History     Tobacco Use    Smoking status: Never    Smokeless tobacco: Never   Vaping Use    Vaping status: Never Used   Substance Use Topics    Alcohol use: Never    Drug use: Never      E-Cigarette/Vaping    E-Cigarette Use Never User       E-Cigarette/Vaping Substances    Nicotine No     THC No     CBD No     Flavoring No       I have reviewed and agree with the history as documented.     34-year-old female presenting to the ED with a complaint of right lower quadrant abdominal pain.  Patient is doing well.  She is 13 weeks pregnant.  Patient states that the pain started yesterday and came on out of nowhere.  Pain is right lower quadrant radiating to the vaginal area.  Patient denies discharge or vaginal bleeding.  Patient denies difficulty urinating or having a bowel movement.  Patient denies fever, chills, shortness of breath, difficulty breathing.  Patient describes pain as dull and worse with palpation.  Is better with laying down in different positional places.  Pain does not change with food or bowel movements.  Patient has had no loss of appetite, no nausea, no vomiting.        Review of Systems   Constitutional:  Negative for chills and fever.   HENT:  Negative for congestion and rhinorrhea.    Eyes:  Negative for visual disturbance.   Respiratory:  Negative for chest tightness and shortness of breath.    Cardiovascular:  Negative for chest pain and palpitations.   Gastrointestinal:  Positive for abdominal pain. Negative for constipation, diarrhea, nausea and vomiting.   Genitourinary:  Negative for dysuria and hematuria.   Musculoskeletal:  Negative for back pain and neck pain.   Skin:  Negative for color change.   Neurological:  Negative for dizziness, weakness and headaches.   Psychiatric/Behavioral:  Negative for agitation and confusion.            Objective       ED Triage Vitals [11/07/24 1454]   Temperature Pulse Blood Pressure Respirations SpO2 Patient  Position - Orthostatic VS   98.4 °F (36.9 °C) (!) 111 (!) 173/97 20 96 % --      Temp Source Heart Rate Source BP Location FiO2 (%) Pain Score    Oral Monitor Right arm -- --      Vitals      Date and Time Temp Pulse SpO2 Resp BP Pain Score FACES Pain Rating User   11/07/24 1903 -- 81 97 % 20 112/58 -- -- KB   11/07/24 1454 98.4 °F (36.9 °C) 111 96 % 20 173/97 -- -- NS            Physical Exam  Constitutional:       Appearance: Normal appearance.   HENT:      Head: Normocephalic and atraumatic.      Right Ear: External ear normal.      Left Ear: External ear normal.      Nose: Nose normal.      Mouth/Throat:      Mouth: Mucous membranes are moist.      Pharynx: Oropharynx is clear.   Eyes:      Extraocular Movements: Extraocular movements intact.      Pupils: Pupils are equal, round, and reactive to light.   Cardiovascular:      Rate and Rhythm: Normal rate and regular rhythm.      Pulses: Normal pulses.      Heart sounds: Normal heart sounds.   Pulmonary:      Effort: Pulmonary effort is normal.      Breath sounds: Normal breath sounds.   Abdominal:      General: Bowel sounds are normal.      Palpations: Abdomen is soft.      Tenderness: There is abdominal tenderness in the right lower quadrant. There is no right CVA tenderness, left CVA tenderness, guarding or rebound. Negative signs include Damon's sign and McBurney's sign.   Musculoskeletal:         General: Normal range of motion.      Cervical back: Normal range of motion.   Skin:     General: Skin is warm.      Capillary Refill: Capillary refill takes less than 2 seconds.   Neurological:      General: No focal deficit present.      Mental Status: She is alert and oriented to person, place, and time.   Psychiatric:         Mood and Affect: Mood normal.         Behavior: Behavior normal.         Results Reviewed       Procedure Component Value Units Date/Time    Comprehensive metabolic panel [[id number]]  (Abnormal) Collected: 11/07/24 1559    Lab Status: Final  result Specimen: Blood from Arm, Left Updated: 11/07/24 1625     Sodium 134 mmol/L      Potassium 3.8 mmol/L      Chloride 103 mmol/L      CO2 24 mmol/L      ANION GAP 7 mmol/L      BUN 8 mg/dL      Creatinine 0.55 mg/dL      Glucose 90 mg/dL      Calcium 8.8 mg/dL      AST 15 U/L      ALT 15 U/L      Alkaline Phosphatase 39 U/L      Total Protein 7.4 g/dL      Albumin 3.9 g/dL      Total Bilirubin 0.28 mg/dL      eGFR 123 ml/min/1.73sq m     Narrative:      National Kidney Disease Foundation guidelines for Chronic Kidney Disease (CKD):     Stage 1 with normal or high GFR (GFR > 90 mL/min/1.73 square meters)    Stage 2 Mild CKD (GFR = 60-89 mL/min/1.73 square meters)    Stage 3A Moderate CKD (GFR = 45-59 mL/min/1.73 square meters)    Stage 3B Moderate CKD (GFR = 30-44 mL/min/1.73 square meters)    Stage 4 Severe CKD (GFR = 15-29 mL/min/1.73 square meters)    Stage 5 End Stage CKD (GFR <15 mL/min/1.73 square meters)  Note: GFR calculation is accurate only with a steady state creatinine    CBC and differential [[id number]]  (Abnormal) Collected: 11/07/24 6529    Lab Status: Final result Specimen: Blood from Arm, Left Updated: 11/07/24 1615     WBC 9.80 Thousand/uL      RBC 4.98 Million/uL      Hemoglobin 13.5 g/dL      Hematocrit 40.7 %      MCV 82 fL      MCH 27.1 pg      MCHC 33.2 g/dL      RDW 13.0 %      MPV 8.4 fL      Platelets 316 Thousands/uL      nRBC 0 /100 WBCs      Segmented % 60 %      Immature Grans % 1 %      Lymphocytes % 23 %      Monocytes % 8 %      Eosinophils Relative 7 %      Basophils Relative 1 %      Absolute Neutrophils 6.00 Thousands/µL      Absolute Immature Grans 0.05 Thousand/uL      Absolute Lymphocytes 2.27 Thousands/µL      Absolute Monocytes 0.74 Thousand/µL      Eosinophils Absolute 0.67 Thousand/µL      Basophils Absolute 0.07 Thousands/µL     UA w Reflex to Microscopic w Reflex to Culture [[id number]] Collected: 11/07/24 6569    Lab Status: Final result Specimen: Urine, Clean Catch  Updated: 24 1610     Color, UA Light Yellow     Clarity, UA Turbid     Specific Gravity, UA 1.012     pH, UA 5.5     Leukocytes, UA Negative     Nitrite, UA Negative     Protein, UA Negative mg/dl      Glucose, UA Negative mg/dl      Ketones, UA Negative mg/dl      Urobilinogen, UA <2.0 mg/dl      Bilirubin, UA Negative     Occult Blood, UA Negative     URINE COMMENT --    Urine culture [[id number]] Collected: 24 9105    Lab Status: In process Specimen: Urine, Clean Catch Updated: 24 1610            US appendix   Final Interpretation by Terry Cochran DO ([id number])   Although the appendix is not identified, there are no secondary sonographic findings to suggest acute appendicitis.            Workstation performed: [id number]         US OB limited Dating Study   Final Interpretation by Terry Cochran DO ([id number])      Single live intrauterine gestation at 12 weeks 5 days based on crown-rump length (range +/- 1 week / 1 day).   AKILAH of May 17, 2025.      No evidence for ovarian torsion.      Workstation performed: [id number]             Procedures    ED Medication and Procedure Management   Prior to Admission Medications   Prescriptions Last Dose Informant Patient Reported? Taking?   Doxylamine-Pyridoxine (Diclegis) 10-10 MG TBEC   No No   Si delayed-release tablets PO on a daily basis at bedtime  If symptoms not adequately controlled, increase dose to 4 tablets each day (1 tab in AM, 1 tab mid-afternoon, and 2 tabs at bedtime)   Lidocaine 4 % PTCH   No No   Sig: Apply 1 patch topically daily as needed (left shoulder pain) for up to 5 days   famotidine (PEPCID) 20 mg tablet   No No   Sig: Take 1 tablet (20 mg total) by mouth 2 (two) times a day   metoclopramide (Reglan) 10 mg tablet   No No   Sig: Take 1 tablet (10 mg total) by mouth every 6 (six) hours as needed (nausea/vomiting)   Patient not taking: Reported on 10/21/2024   ondansetron (ZOFRAN-ODT) 4 mg disintegrating tablet   No No    Sig: Take 1 tablet (4 mg total) by mouth every 6 (six) hours as needed for nausea or vomiting      Facility-Administered Medications: None     Discharge Medication List as of 11/7/2024  7:58 PM        CONTINUE these medications which have NOT CHANGED    Details   Doxylamine-Pyridoxine (Diclegis) 10-10 MG TBEC 2 delayed-release tablets PO on a daily basis at bedtime  If symptoms not adequately controlled, increase dose to 4 tablets each day (1 tab in AM, 1 tab mid-afternoon, and 2 tabs at bedtime), Print      famotidine (PEPCID) 20 mg tablet Take 1 tablet (20 mg total) by mouth 2 (two) times a day, Starting Fri 10/25/2024, Normal      Lidocaine 4 % PTCH Apply 1 patch topically daily as needed (left shoulder pain) for up to 5 days, Starting Fri 10/25/2024, Until Wed 10/30/2024 at 2359, Normal      metoclopramide (Reglan) 10 mg tablet Take 1 tablet (10 mg total) by mouth every 6 (six) hours as needed (nausea/vomiting), Starting Tue 10/8/2024, Normal      ondansetron (ZOFRAN-ODT) 4 mg disintegrating tablet Take 1 tablet (4 mg total) by mouth every 6 (six) hours as needed for nausea or vomiting, Starting Fri 10/25/2024, Normal           No discharge procedures on file.  ED SEPSIS DOCUMENTATION   Time reflects when diagnosis was documented in both MDM as applicable and the Disposition within this note       Time User Action Codes Description Comment    11/7/2024  7:57 PM Cam Bird Add [R10.31] Right lower quadrant abdominal pain                  Cam Bird MD  11/07/24 1484

## 2024-11-07 NOTE — TELEPHONE ENCOUNTER
Patient returned call for cancelled initial ob appt.   S/w Concetta in office, advised they will call pt back to possibly overbook. All r/s appts displayed as 15 mins in error, initial ob requires 30 mins.

## 2024-11-07 NOTE — TELEPHONE ENCOUNTER
Patient called our Pulmonary practice to reschedule an OBGYN appointment that was scheduled for today. I was not able to transfer the patient but provided the number [phone] so she can call and get that taken care of.      Thank you.

## 2024-11-07 NOTE — ED PROCEDURE NOTE
Procedure  POC Pelvic US    Date/Time: 11/7/2024 5:20 PM    Performed by: Morgan Serra MD MPH  Authorized by: Morgan Serra MD MPH    Patient location:  ED  Procedure details:     Exam Type:  Educational    Indications: pregnant with abdominal pain      Assessment for: confirm intrauterine pregnancy and evaluate fetal viability      Image quality: limited diagnostic    Uterine findings:     Intrauterine pregnancy: identified      Single gestation: identified      Fetal pole: identified      Fetal heart rate: identified      Fetal heart rate (bpm):  145  Interpretation:     Ultrasound impressions: normal      Pregnancy findings: intrauterine pregnancy (IUP)                     Morgan Serra MD MPH  11/07/24 1728

## 2024-11-07 NOTE — TELEPHONE ENCOUNTER
Patient called in regarding needing to rescheduled initial OB visit that she had today that was cancelled. Tried to transfer to the office but was disconnected please follow up.

## 2024-11-08 ENCOUNTER — ROUTINE PRENATAL (OUTPATIENT)
Dept: PERINATAL CARE | Facility: OTHER | Age: 34
End: 2024-11-08
Payer: COMMERCIAL

## 2024-11-08 VITALS
BODY MASS INDEX: 41.09 KG/M2 | DIASTOLIC BLOOD PRESSURE: 70 MMHG | HEART RATE: 98 BPM | WEIGHT: 246.6 LBS | HEIGHT: 65 IN | SYSTOLIC BLOOD PRESSURE: 118 MMHG

## 2024-11-08 DIAGNOSIS — Z36.82 ENCOUNTER FOR (NT) NUCHAL TRANSLUCENCY SCAN: ICD-10-CM

## 2024-11-08 DIAGNOSIS — E66.01 SEVERE OBESITY DUE TO EXCESS CALORIES AFFECTING PREGNANCY IN FIRST TRIMESTER (HCC): ICD-10-CM

## 2024-11-08 DIAGNOSIS — Z34.90 EARLY STAGE OF PREGNANCY: ICD-10-CM

## 2024-11-08 DIAGNOSIS — O99.211 SEVERE OBESITY DUE TO EXCESS CALORIES AFFECTING PREGNANCY IN FIRST TRIMESTER (HCC): ICD-10-CM

## 2024-11-08 DIAGNOSIS — Z3A.12 12 WEEKS GESTATION OF PREGNANCY: Primary | ICD-10-CM

## 2024-11-08 LAB
BACTERIA UR CULT: NORMAL
BACTERIA UR CULT: NORMAL

## 2024-11-08 PROCEDURE — 99203 OFFICE O/P NEW LOW 30 MIN: CPT | Performed by: STUDENT IN AN ORGANIZED HEALTH CARE EDUCATION/TRAINING PROGRAM

## 2024-11-08 PROCEDURE — 36415 COLL VENOUS BLD VENIPUNCTURE: CPT | Performed by: STUDENT IN AN ORGANIZED HEALTH CARE EDUCATION/TRAINING PROGRAM

## 2024-11-08 PROCEDURE — 76801 OB US < 14 WKS SINGLE FETUS: CPT | Performed by: STUDENT IN AN ORGANIZED HEALTH CARE EDUCATION/TRAINING PROGRAM

## 2024-11-08 PROCEDURE — 76813 OB US NUCHAL MEAS 1 GEST: CPT | Performed by: STUDENT IN AN ORGANIZED HEALTH CARE EDUCATION/TRAINING PROGRAM

## 2024-11-08 RX ORDER — ASPIRIN 81 MG/1
162 TABLET ORAL DAILY
Qty: 60 TABLET | Refills: 3 | Status: SHIPPED | OUTPATIENT
Start: 2024-11-08

## 2024-11-08 NOTE — DISCHARGE INSTRUCTIONS
Please follow-up with your obstetrician tomorrow to discuss further workup.  Today we not see any signs of infection, signs of appendicitis.  Baby looked well-appearing and there was no signs of elevated ovarian torsion.  Should you start having fevers, chills or vaginal discharge please return to the ED.

## 2024-11-08 NOTE — LETTER
"2024     Delisa Pérez MD  [address]  [address]  [place] [address]    Patient: Araceli Levy   YOB: 1990   Date of Visit: 2024       Dear Dr. Pérez:    Thank you for referring Araceli Levy to me for evaluation. Below are my notes for this consultation.    If you have questions, please do not hesitate to call me. I look forward to following your patient along with you.         Sincerely,        Mimi Jacome MD        CC: No Recipients    Mimi Jacome MD  2024  1:17 PM  Sign when Signing Visit  [facility]: Ms. Levy was seen today for nuchal translucency ultrasound.  See ultrasound report under \"OB Procedures\" tab.        Physical Exam  Constitutional:       General: She is not in acute distress.     Appearance: Normal appearance.   HENT:      Head: Normocephalic and atraumatic.   Eyes:      Extraocular Movements: Extraocular movements intact.   Cardiovascular:      Rate and Rhythm: Normal rate.   Pulmonary:      Effort: Pulmonary effort is normal. No respiratory distress.   Skin:     Findings: No erythema or rash.   Neurological:      Mental Status: She is alert and oriented to person, place, and time.   Psychiatric:         Mood and Affect: Mood normal.         Behavior: Behavior normal.         Please don't hesitate to contact our office with any concerns or questions.  -Mimi Jacome MD    "

## 2024-11-08 NOTE — TELEPHONE ENCOUNTER
Lm for patient to call back and confirm new appt     Scheduled PN1 for 11/11 at 11:30 with Dr. Yi in [place] - need her to confirm

## 2024-11-08 NOTE — PROGRESS NOTES
"[facility]: Ms. Levy was seen today for nuchal translucency ultrasound.  See ultrasound report under \"OB Procedures\" tab.        Physical Exam  Constitutional:       General: She is not in acute distress.     Appearance: Normal appearance.   HENT:      Head: Normocephalic and atraumatic.   Eyes:      Extraocular Movements: Extraocular movements intact.   Cardiovascular:      Rate and Rhythm: Normal rate.   Pulmonary:      Effort: Pulmonary effort is normal. No respiratory distress.   Skin:     Findings: No erythema or rash.   Neurological:      Mental Status: She is alert and oriented to person, place, and time.   Psychiatric:         Mood and Affect: Mood normal.         Behavior: Behavior normal.         Please don't hesitate to contact our office with any concerns or questions.  -Mimi Jacome MD    "

## 2024-11-08 NOTE — TELEPHONE ENCOUNTER
Patient was returning [facility]'s call to confirm appt for 11/11/24 at 1130 at [facility]

## 2024-11-08 NOTE — PROGRESS NOTES
Patient chose to have [facility] [id number] Non-Invasive Prenatal Screen [id number] PLUS w/ SCA, WITH fetal sex.  Patient choose to be billed through insurance.     Patient given brochure and is aware [facility] will contact patient's insurance and coordinate coverage.  Provided [facility] contact information. General inquiries [phone], Cost estimates [phone] and [facility] Billing [phone]. Website [email or web address].     Blood collection tubes labeled with patient identifiers (name, medical record number, and date of birth).     Filled out [facility] order form. Patient chose to have blood drawn in our office at time of visit. NIPS was drawn from left arm with a straight needle and with a butterfly needle by BHARATH Madrigal. .      If patient chose to have blood work drawn at a [facility] lab we requested patient notify MFM (via phone call or ED01 message) when blood collected so office can follow up on results.       Maternal Fetal Medicine will have results in approximately 5-7 business days and will call patient or notify via ED01.  Patient aware viewing lab result online will reveal fetal sex if ordered.    Patient verbalized understanding of all instructions and no questions at this time.

## 2024-11-11 ENCOUNTER — OFFICE VISIT (OUTPATIENT)
Age: 34
End: 2024-11-11
Payer: COMMERCIAL

## 2024-11-11 VITALS
BODY MASS INDEX: 41.79 KG/M2 | WEIGHT: 250.8 LBS | DIASTOLIC BLOOD PRESSURE: 78 MMHG | HEIGHT: 65 IN | SYSTOLIC BLOOD PRESSURE: 118 MMHG

## 2024-11-11 DIAGNOSIS — Z3A.13 13 WEEKS GESTATION OF PREGNANCY: Primary | ICD-10-CM

## 2024-11-11 DIAGNOSIS — Z11.3 SCREEN FOR STD (SEXUALLY TRANSMITTED DISEASE): ICD-10-CM

## 2024-11-11 DIAGNOSIS — Z01.419 ENCOUNTER FOR GYNECOLOGICAL EXAMINATION (GENERAL) (ROUTINE) WITHOUT ABNORMAL FINDINGS: ICD-10-CM

## 2024-11-11 DIAGNOSIS — Z34.82 PRENATAL CARE, SUBSEQUENT PREGNANCY, SECOND TRIMESTER: ICD-10-CM

## 2024-11-11 DIAGNOSIS — O99.210 OBESITY IN PREGNANCY: ICD-10-CM

## 2024-11-11 DIAGNOSIS — Z36.9 ENCOUNTER FOR ANTENATAL SCREENING: ICD-10-CM

## 2024-11-11 DIAGNOSIS — Z11.51 SCREENING FOR HPV (HUMAN PAPILLOMAVIRUS): ICD-10-CM

## 2024-11-11 LAB
SL AMB  POCT GLUCOSE, UA: NORMAL
SL AMB POCT URINE PROTEIN: NORMAL

## 2024-11-11 PROCEDURE — G0145 SCR C/V CYTO,THINLAYER,RESCR: HCPCS | Performed by: OBSTETRICS & GYNECOLOGY

## 2024-11-11 PROCEDURE — 81002 URINALYSIS NONAUTO W/O SCOPE: CPT | Performed by: OBSTETRICS & GYNECOLOGY

## 2024-11-11 PROCEDURE — 99214 OFFICE O/P EST MOD 30 MIN: CPT | Performed by: OBSTETRICS & GYNECOLOGY

## 2024-11-11 PROCEDURE — 87591 N.GONORRHOEAE DNA AMP PROB: CPT | Performed by: OBSTETRICS & GYNECOLOGY

## 2024-11-11 PROCEDURE — G0476 HPV COMBO ASSAY CA SCREEN: HCPCS | Performed by: OBSTETRICS & GYNECOLOGY

## 2024-11-11 PROCEDURE — 87491 CHLMYD TRACH DNA AMP PROBE: CPT | Performed by: OBSTETRICS & GYNECOLOGY

## 2024-11-11 NOTE — ASSESSMENT & PLAN NOTE
Doing well. + FHR: 160s on bedside US. PNL complete. Reviewed that she can take zofran and diclegis safely in pregnancy (pharmacist told her zofran is not safe and I reviewed evidence with her)

## 2024-11-11 NOTE — ASSESSMENT & PLAN NOTE
PG BMI 41. Abnormal early 1 H, plans for 3H GTT next week. Reviewed weight gain recommendations of 11-20 lbs. Also reviewed exercise safety in pregnancy and necouraged her to go back to the gym which she stopped recently

## 2024-11-11 NOTE — PROGRESS NOTES
Patient presents for initial prenatal visit.     12W5D  AKILAH: 25  Last pap: never had a pap  Prenatal labs completed, needs to go for 3hr GTT.  Nuchal u/s:   Anatomy u/s: scheduled for 1/2  Blue folder given and reviewed at OB intake visit.  No LOF, bleeding, cramping or discharge.  Still has nausea and is tired.  AFP ordered today.

## 2024-11-11 NOTE — PROGRESS NOTES
"Ambulatory Visit  Name: Araceli Levy      : 1990      MRN: [id number]  Encounter Provider: Hedy Yi MD  Encounter Date: 2024   Encounter department: [facility] [place]    Assessment & Plan  13 weeks gestation of pregnancy  Doing well. + FHR: 160s on bedside US. PNL complete. Reviewed that she can take zofran and diclegis safely in pregnancy (pharmacist told her zofran is not safe and I reviewed evidence with her)         Obesity in pregnancy  PG BMI 41. Abnormal early 1 H, plans for 3H GTT next week. Reviewed weight gain recommendations of 11-20 lbs. Also reviewed exercise safety in pregnancy and necouraged her to go back to the gym which she stopped recently           History of Present Illness     Araceli Levy is a 34 y.o. female who presents for PN1      12W5D  AKILAH: 25  Last pap: never had a pap  Prenatal labs completed, needs to go for 3hr GTT- has scheduled next week  Nuchal u/s:  normal, NIPT pending  Anatomy u/s: scheduled for 1/2  Blue folder given and reviewed at OB intake visit.  No LOF, bleeding, cramping or discharge.  Still has nausea and is tired.  AFP ordered today.          Review of Systems        Objective     /78 (BP Location: Left arm, Patient Position: Sitting, Cuff Size: Large)   Ht 5' 5\" (1.651 m)   Wt 114 kg (250 lb 12.8 oz)   LMP 2024 (Exact Date)   BMI 41.74 kg/m²     Physical Exam  Vitals and nursing note reviewed.   Constitutional:       General: She is not in acute distress.     Appearance: She is not ill-appearing.   Cardiovascular:      Rate and Rhythm: Normal rate and regular rhythm.   Pulmonary:      Effort: Pulmonary effort is normal. No respiratory distress.      Breath sounds: Normal breath sounds. No wheezing.   Chest:   Breasts:     Right: Normal. No mass, nipple discharge or skin change.      Left: Normal. No mass, nipple discharge or skin change.   Abdominal:      " General: There is no distension.      Palpations: Abdomen is soft.      Tenderness: There is no abdominal tenderness. There is no guarding or rebound.   Genitourinary:     General: Normal vulva.      Exam position: Lithotomy position.      Vagina: Normal.      Cervix: Normal. No cervical motion tenderness.      Uterus: Normal. Not tender.       Adnexa:         Right: No fullness.          Left: No fullness.     Lymphadenopathy:      Upper Body:      Right upper body: No axillary adenopathy.      Left upper body: No axillary adenopathy.   Neurological:      Mental Status: She is alert.

## 2024-11-12 ENCOUNTER — NURSE TRIAGE (OUTPATIENT)
Age: 34
End: 2024-11-12

## 2024-11-12 LAB
HPV HR 12 DNA CVX QL NAA+PROBE: NEGATIVE
HPV16 DNA CVX QL NAA+PROBE: NEGATIVE
HPV18 DNA CVX QL NAA+PROBE: NEGATIVE

## 2024-11-12 NOTE — TELEPHONE ENCOUNTER
"13w3d OB patient called in stating she had a pap smear done yesterday and had some light spotting afterward. Reports 2 instances of light spotting, brownish with the tiniest dot of red. No cramping. Discussed with patient It is known that in pregnancy, the cervix is very vascular which means there is an increase in blood flow to the area. Due to this, things such as vaginal exams or sexual intercourse can lead to spotting. Advised it sounds like the spotting could be due to her pap and exam yesterday. Reviewed with patient monitoring symptoms in the meantime, and contacting the office back with any new or worsening symptoms.    Reason for Disposition   SPOTTING after a pelvic examination (single or brief episode)    Answer Assessment - Initial Assessment Questions  1. ONSET: \"When did this bleeding start?\"        Yesterday after pap   2. BLEEDING SEVERITY: \"Describe the bleeding that you are having.\" \"How much bleeding is there?\"       Very light spotting, brownish- tiny dot of red   3. ABDOMEN PAIN: \"Do you have any pain?\" \"How bad is the pain?\"  (e.g., Scale 0-10; none, mild, moderate, or severe)      Denies  4. PREGNANCY: \"Do you know how many weeks or months pregnant you are?\" \"When was the first day of your last normal menstrual period?\"      13w3d  5. ULTRASOUND: \"Have you had an ultrasound during this pregnancy?\"  Note: To confirm intrauterine pregnancy, placenta location.      Yes  6. HEMODYNAMIC STATUS: \"Are you weak or feeling lightheaded?\" If Yes, ask: \"Can you stand and walk normally?\"       No   7. OTHER SYMPTOMS: \"What other symptoms are you having with the bleeding?\" (e.g., passed tissue, vaginal discharge, fever, menstrual-type cramps)      Denies    Protocols used: Pregnancy - Vaginal Bleeding Less Than 20 Weeks EGA-Adult-[place]    "

## 2024-11-13 ENCOUNTER — RESULTS FOLLOW-UP (OUTPATIENT)
Dept: OBGYN CLINIC | Facility: CLINIC | Age: 34
End: 2024-11-13

## 2024-11-13 ENCOUNTER — HOSPITAL ENCOUNTER (EMERGENCY)
Facility: HOSPITAL | Age: 34
Discharge: HOME | End: 2024-11-13
Attending: EMERGENCY MEDICINE
Payer: COMMERCIAL

## 2024-11-13 VITALS
HEART RATE: 92 BPM | BODY MASS INDEX: 41.09 KG/M2 | DIASTOLIC BLOOD PRESSURE: 87 MMHG | SYSTOLIC BLOOD PRESSURE: 141 MMHG | TEMPERATURE: 98.2 F | RESPIRATION RATE: 18 BRPM | OXYGEN SATURATION: 100 % | WEIGHT: 246.91 LBS

## 2024-11-13 DIAGNOSIS — O46.90 VAGINAL BLEEDING DURING PREGNANCY: Primary | ICD-10-CM

## 2024-11-13 LAB
C TRACH DNA SPEC QL NAA+PROBE: NEGATIVE
CFDNA.FET/CFDNA.TOTAL SFR FETUS: NORMAL %
CITATION REF LAB TEST: NORMAL
FET 13+18+21+X+Y ANEUP PLAS.CFDNA: NEGATIVE
FET CHR 21 TS PLAS.CFDNA QL: NEGATIVE
FET CHR 21 TS PLAS.CFDNA QL: NEGATIVE
FET MS X RISK WBC.DNA+CFDNA QL: NOT DETECTED
FET SEX PLAS.CFDNA DOSAGE CFDNA: NORMAL
FET TS 13 RISK PLAS.CFDNA QL: NEGATIVE
FET X + Y ANEUP RISK PLAS.CFDNA SEQ-IMP: NOT DETECTED
GA EST FROM CONCEPTION DATE: NORMAL D
GESTATIONAL AGE > 9:: YES
LAB DIRECTOR NAME PROVIDER: NORMAL
LAB DIRECTOR NAME PROVIDER: NORMAL
LABORATORY COMMENT REPORT: NORMAL
LIMITATIONS OF THE TEST: NORMAL
N GONORRHOEA DNA SPEC QL NAA+PROBE: NEGATIVE
NEGATIVE PREDICTIVE VALUE: NORMAL
PERFORMANCE CHARACTERISTICS: NORMAL
POSITIVE PREDICTIVE VALUE: NORMAL
REF LAB TEST METHOD: NORMAL
SL AMB NOTE:: NORMAL
TEST PERFORMANCE INFO SPEC: NORMAL

## 2024-11-13 PROCEDURE — 99284 EMERGENCY DEPT VISIT MOD MDM: CPT

## 2024-11-13 PROCEDURE — 99285 EMERGENCY DEPT VISIT HI MDM: CPT | Performed by: EMERGENCY MEDICINE

## 2024-11-13 NOTE — ED PROVIDER NOTES
"Time reflects when diagnosis was documented in both MDM as applicable and the Disposition within this note       Time User Action Codes Description Comment    2024  6:42 PM Balaji Pearl Add [O46.90] Vaginal bleeding during pregnancy           ED Disposition       ED Disposition   Discharge    Condition   Stable    Date/Time     6:44 PM    Comment   Araceli Levy discharge to home/self care.                   Assessment & Plan       Medical Decision Making  34-year-old female, approximately 14 weeks pregnant, presenting for evaluation of mild vaginal bleeding.  Differential diagnosis includes post procedure bleeding, threatened , dehydration among other diagnoses.  Patient looks well in no distress, appears well-hydrated on exam.  Bedside transabdominal pelvic ultrasound performed by myself, intrauterine pregnancy noted with fetal movements and fetal heart tones.  Patient's bleeding likely related to recent Pap smear, discussed with patient follow-up with obstetrician for further evaluation, return precautions given.    Amount and/or Complexity of Data Reviewed  External Data Reviewed: labs.        ED Course as of 24 1853      1826 Previous labs reviewed, patient's blood type O+.       Medications - No data to display    ED Risk Strat Scores                                               History of Present Illness       Chief Complaint   Patient presents with    Vaginal Bleeding - Pregnant     Patient reports vaginal bleeding in pregnancy- started 3 days ago after \"pap with clamp\" at OBGYN, bleeding stopped and then started again.  Reports it as spotting-not needing to change her pad       Past Medical History:   Diagnosis Date    Migraine       Past Surgical History:   Procedure Laterality Date    OTHER SURGICAL HISTORY Right     Arm Fractured bone at 6 y.o      Family History   Problem Relation Age of Onset    Thyroid disease Mother     Other Mother         " Tachycardia    Asthma Mother     Heart attack Father         Pacemaker    Diabetes Father         T2DM    No Known Problems Sister     Other Sister         Tachycardia due to anxiety    No Known Problems Sister     Diabetes Maternal Grandmother     Stroke Maternal Grandmother     No Known Problems Paternal Grandmother     Diabetes Paternal Grandfather     Asthma Sister     Asthma Sister     Breast cancer Neg Hx     Colon cancer Neg Hx     Ovarian cancer Neg Hx       Social History     Tobacco Use    Smoking status: Never    Smokeless tobacco: Never   Vaping Use    Vaping status: Never Used   Substance Use Topics    Alcohol use: Never    Drug use: Never      E-Cigarette/Vaping    E-Cigarette Use Never User       E-Cigarette/Vaping Substances    Nicotine No     THC No     CBD No     Flavoring No       I have reviewed and agree with the history as documented.     34-year-old female, approximately 14 weeks pregnant, presents with mild vaginal bleeding.  Patient states that she has had mild spotting of blood for the past few days after having Pap smear performed.  Patient states that she was having some right-sided pelvic pain yesterday which improved today.  Denies any fevers.  Reports nausea and vomiting throughout pregnancy, able to tolerate oral intake.      History provided by:  Patient   used: No        Review of Systems   Constitutional: Negative.  Negative for fever.   Genitourinary:  Negative for dysuria.           Objective       ED Triage Vitals   Temperature Pulse Blood Pressure Respirations SpO2 Patient Position - Orthostatic VS   11/13/24 1731 11/13/24 1730 11/13/24 1730 11/13/24 1730 11/13/24 1730 --   98.2 °F (36.8 °C) 92 141/87 18 100 %       Temp src Heart Rate Source BP Location FiO2 (%) Pain Score    -- 11/13/24 1730 11/13/24 1730 -- 11/13/24 1730     Monitor Left arm  1      Vitals      Date and Time Temp Pulse SpO2 Resp BP Pain Score FACES Pain Rating User   11/13/24 1731 98.2 °F  (36.8 °C) -- -- -- -- -- -- EMR   24 1730 -- 92 100 % 18 141/87 1 -- EMR            Physical Exam  Vitals and nursing note reviewed.   Constitutional:       General: She is not in acute distress.  HENT:      Head: Normocephalic and atraumatic.      Mouth/Throat:      Mouth: Mucous membranes are moist.      Pharynx: Oropharynx is clear.   Cardiovascular:      Rate and Rhythm: Normal rate.   Pulmonary:      Effort: Pulmonary effort is normal.   Abdominal:      Palpations: Abdomen is soft.      Tenderness: There is no abdominal tenderness.   Skin:     General: Skin is warm and dry.   Neurological:      General: No focal deficit present.      Mental Status: She is alert and oriented to person, place, and time.      Motor: No weakness.      Gait: Gait normal.         Results Reviewed       None            No orders to display       POC Pelvic US    Date/Time: 2024 6:48 PM    Performed by: Balaji Pearl MD  Authorized by: Balaji Pearl MD    Patient location:  ED  Procedure details:     Exam Type:  Diagnostic    Indications: evaluate for IUP      Assessment for: confirm intrauterine pregnancy      Technique:  Transabdominal obstetric (HCG+) exam    Views obtained: uterus (transverse and sagittal)      Image quality: diagnostic      Image availability:  Not saved  Uterine findings:     Intrauterine pregnancy: identified      Fetal heart rate: identified    Comments:      Intrauterine pregnancy noted with fetal movements, fetal heart tones noted      ED Medication and Procedure Management   Prior to Admission Medications   Prescriptions Last Dose Informant Patient Reported? Taking?   Doxylamine-Pyridoxine (Diclegis) 10-10 MG TBEC   No No   Si delayed-release tablets PO on a daily basis at bedtime  If symptoms not adequately controlled, increase dose to 4 tablets each day (1 tab in AM, 1 tab mid-afternoon, and 2 tabs at bedtime)   Patient not taking: Reported on 2024   aspirin (ECOTRIN LOW  STRENGTH) 81 mg EC tablet   No No   Sig: Take 2 tablets (162 mg total) by mouth daily Stop at 36 weeks.  Contact your OB or MFM with any side effects.  See [email or web address]   famotidine (PEPCID) 20 mg tablet   No No   Sig: Take 1 tablet (20 mg total) by mouth 2 (two) times a day   Patient not taking: Reported on 11/8/2024   metoclopramide (Reglan) 10 mg tablet   No No   Sig: Take 1 tablet (10 mg total) by mouth every 6 (six) hours as needed (nausea/vomiting)   Patient not taking: Reported on 10/21/2024      Facility-Administered Medications: None     Discharge Medication List as of 11/13/2024  6:44 PM        CONTINUE these medications which have NOT CHANGED    Details   aspirin (ECOTRIN LOW STRENGTH) 81 mg EC tablet Take 2 tablets (162 mg total) by mouth daily Stop at 36 weeks.  Contact your OB or MFM with any side effects.  See [email or web address], Starting Fri 11/8/2024, Normal      Doxylamine-Pyridoxine (Diclegis) 10-10 MG TBEC 2 delayed-release tablets PO on a daily basis at bedtime  If symptoms not adequately controlled, increase dose to 4 tablets each day (1 tab in AM, 1 tab mid-afternoon, and 2 tabs at bedtime), Print      famotidine (PEPCID) 20 mg tablet Take 1 tablet (20 mg total) by mouth 2 (two) times a day, Starting Fri 10/25/2024, Normal      metoclopramide (Reglan) 10 mg tablet Take 1 tablet (10 mg total) by mouth every 6 (six) hours as needed (nausea/vomiting), Starting Tue 10/8/2024, Normal           No discharge procedures on file.  ED SEPSIS DOCUMENTATION   Time reflects when diagnosis was documented in both MDM as applicable and the Disposition within this note       Time User Action Codes Description Comment    11/13/2024  6:42 PM Balaji Pearl Add [O46.90] Vaginal bleeding during pregnancy                  Balaji Pearl MD  11/13/24 9388

## 2024-11-14 ENCOUNTER — RESULTS FOLLOW-UP (OUTPATIENT)
Dept: PERINATAL CARE | Facility: CLINIC | Age: 34
End: 2024-11-14

## 2024-11-14 ENCOUNTER — ROUTINE PRENATAL (OUTPATIENT)
Age: 34
End: 2024-11-14
Payer: COMMERCIAL

## 2024-11-14 VITALS
SYSTOLIC BLOOD PRESSURE: 118 MMHG | HEIGHT: 65 IN | BODY MASS INDEX: 41.55 KG/M2 | DIASTOLIC BLOOD PRESSURE: 72 MMHG | WEIGHT: 249.4 LBS | HEART RATE: 89 BPM

## 2024-11-14 DIAGNOSIS — O46.90 VAGINAL BLEEDING IN PREGNANCY: ICD-10-CM

## 2024-11-14 DIAGNOSIS — Z3A.13 13 WEEKS GESTATION OF PREGNANCY: Primary | ICD-10-CM

## 2024-11-14 PROBLEM — G43.919 REFRACTORY MIGRAINE WITH AURA: Status: ACTIVE | Noted: 2018-03-01

## 2024-11-14 PROBLEM — G43.019 REFRACTORY MIGRAINE WITHOUT AURA: Status: ACTIVE | Noted: 2018-03-01

## 2024-11-14 PROBLEM — L68.0 HIRSUTISM: Status: ACTIVE | Noted: 2018-03-01

## 2024-11-14 PROBLEM — I24.89 DEMAND ISCHEMIA (HCC): Status: ACTIVE | Noted: 2019-06-07

## 2024-11-14 PROBLEM — I51.4 MYOCARDITIS (HCC): Status: ACTIVE | Noted: 2019-06-07

## 2024-11-14 PROBLEM — F40.01 PANIC DISORDER WITH AGORAPHOBIA: Status: ACTIVE | Noted: 2018-05-10

## 2024-11-14 LAB — SCAN RESULT: NORMAL

## 2024-11-14 PROCEDURE — 99212 OFFICE O/P EST SF 10 MIN: CPT | Performed by: NURSE PRACTITIONER

## 2024-11-14 NOTE — PROGRESS NOTES
Patient presents for a follow up after ER yesterday.  Was having mild bleeding after pap smear, states bleeding has lightened since getting fluids in the hospital. Only had some very light pink. Ultrasound done yesterday at hospital showing fetal heart tones.  Slight cramping yesterday that went away. Some discharge with the bleeding. No LOF.    13W5D

## 2024-11-14 NOTE — PROGRESS NOTES
Assessment/Plan:      Diagnoses and all orders for this visit:    13 weeks gestation of pregnancy  Comments:  Strong fetal heart tones heard today.  Patient declines a cervical check.  She will continue to hydrate.  Pelvic rest advised.    Other orders  -     Prenatal Vit-Fe Fumarate-FA (PRENATAL PO); Take by mouth          Subjective:     Patient ID: Araceli Levy is a 34 y.o. female.    Patient went to the ER for vaginal bleeding after her Pap smear.  She states the bleeding is no longer happening.  She declines a pelvic/cervical exam today because she feels the exam exacerbated the bleeding.  She is happy to just hear fetal heart tones.  She has her regularly scheduled visit in 2 weeks with Dr. Trejo.  All questions answered.    Review of Systems   Constitutional:  Negative for chills, fatigue and unexpected weight change.   HENT:  Negative for congestion.    Respiratory:  Negative for shortness of breath and wheezing.    Cardiovascular:  Negative for chest pain, palpitations and leg swelling.   Genitourinary:  Negative for difficulty urinating.   Musculoskeletal:  Negative for myalgias.   Neurological:  Negative for dizziness and headaches.       Objective:     Physical Exam  Abdominal:      Palpations: Abdomen is soft.      Comments: -150

## 2024-11-14 NOTE — PATIENT INSTRUCTIONS
Patient Education     Pregnancy - The Third Month   About this topic   It is important for you to learn how to take care of yourself to help you have a healthy baby and safe delivery. It is good to have health care throughout your pregnancy.  The third month of your pregnancy starts around week 10 and lasts through week 13. By knowing how far along you are, you can learn what is normal for this stage of your pregnancy and plan for what is next.  General   Your Body   During the third month of your pregnancy, here are some things you can expect.  You may:  Have less morning sickness or upset stomach. This is because the placenta has taken over some of the hormone production for the baby.  Start to gain weight. It is normal to gain about 1 to 3 pounds (.5 to 1.5 kg) in your first 3 months. Most moms gain about 25 to 35 pounds (11 to 16 kg) during their pregnancy. Talk to your doctor about how much weight you should gain.  Have glowing skin because of extra blood flow and hormones  Notice a dark line on your belly. This is normal. You may also be able to feel your uterus in your belly as it starts to grow.  Have more trouble sleeping at night  Have an increase in appetite  Have trouble breathing or have an increase in congestion  Have trouble with bowel movements  Be at a higher risk for getting a yeast infection because of the change in pH of your vagina  Have frequent mood swings.  Your baby’s growth and development:  Your baby's organs are formed and are starting to work together. Eyelids are closed and will stay that way to protect their eyes as they grow.  Their bones are growing. Your baby is able to open and close their mouth and starts to suck their thumb.  Your baby's genitals and reproductive organs are developing, but it is too soon to tell if your baby is a boy or girl with an ultrasound.  The heartbeat is easy to hear with a special tool at the doctor’s office.  Your baby is about 3 inches (8 cm) long and  weighs about 1 ounce (30 gm). Your baby is about the size of a lemon.  Things to Think About   Avoid alcohol, drugs, tobacco products, and second hand smoke  Check with your doctor before taking any kind of drugs. Continue to take your vitamin with folic acid.  Eat small meals and drink more water to help with heartburn. Also go for a walk after eating and avoid spicy, fried foods.  You may need to switch to another size or style of clothes as your baby grows. Be comfortable and know that the baby is well cushioned inside of you.  Stay healthy by eating good foods, exercising, and getting enough rest.  When do I need to call the doctor?   Not gaining any weight or losing weight  Belly pain or cramps that keeps you from eating or sleeping  Continuing to have too much upset stomach and throwing up  Period-like bleeding  Last Reviewed Date   2020-04-20  Consumer Information Use and Disclaimer   This generalized information is a limited summary of diagnosis, treatment, and/or medication information. It is not meant to be comprehensive and should be used as a tool to help the user understand and/or assess potential diagnostic and treatment options. It does NOT include all information about conditions, treatments, medications, side effects, or risks that may apply to a specific patient. It is not intended to be medical advice or a substitute for the medical advice, diagnosis, or treatment of a health care provider based on the health care provider's examination and assessment of a patient’s specific and unique circumstances. Patients must speak with a health care provider for complete information about their health, medical questions, and treatment options, including any risks or benefits regarding use of medications. This information does not endorse any treatments or medications as safe, effective, or approved for treating a specific patient. [employer] and its affiliates disclaim any warranty or liability relating  to this information or the use thereof. The use of this information is governed by the Terms of Use, available at https://www.wolterskluwer.com/en/know/clinical-effectiveness-terms   Copyright   Copyright © 2024 [employer] and its affiliates and/or licensors. All rights reserved.

## 2024-11-15 LAB
LAB AP GYN PRIMARY INTERPRETATION: NORMAL
LAB AP LMP: NORMAL
Lab: NORMAL

## 2024-11-18 ENCOUNTER — HOSPITAL ENCOUNTER (EMERGENCY)
Facility: HOSPITAL | Age: 34
Discharge: HOME | End: 2024-11-18
Attending: EMERGENCY MEDICINE | Admitting: EMERGENCY MEDICINE
Payer: COMMERCIAL

## 2024-11-18 ENCOUNTER — NURSE TRIAGE (OUTPATIENT)
Age: 34
End: 2024-11-18

## 2024-11-18 ENCOUNTER — HOSPITAL ENCOUNTER (EMERGENCY)
Facility: HOSPITAL | Age: 34
Discharge: HOME | End: 2024-11-18
Attending: STUDENT IN AN ORGANIZED HEALTH CARE EDUCATION/TRAINING PROGRAM
Payer: COMMERCIAL

## 2024-11-18 VITALS
HEIGHT: 65 IN | RESPIRATION RATE: 18 BRPM | SYSTOLIC BLOOD PRESSURE: 136 MMHG | WEIGHT: 254.6 LBS | BODY MASS INDEX: 42.42 KG/M2 | DIASTOLIC BLOOD PRESSURE: 80 MMHG | TEMPERATURE: 97.6 F | OXYGEN SATURATION: 95 % | HEART RATE: 95 BPM

## 2024-11-18 VITALS
DIASTOLIC BLOOD PRESSURE: 50 MMHG | TEMPERATURE: 98 F | HEART RATE: 92 BPM | RESPIRATION RATE: 18 BRPM | OXYGEN SATURATION: 98 % | SYSTOLIC BLOOD PRESSURE: 126 MMHG

## 2024-11-18 DIAGNOSIS — O46.90 VAGINAL BLEEDING IN PREGNANCY: Primary | ICD-10-CM

## 2024-11-18 DIAGNOSIS — N93.9 VAGINAL BLEEDING: Primary | ICD-10-CM

## 2024-11-18 LAB
ALBUMIN SERPL BCG-MCNC: 3.6 G/DL (ref 3.5–5)
ALP SERPL-CCNC: 37 U/L (ref 34–104)
ALT SERPL W P-5'-P-CCNC: 14 U/L (ref 7–52)
ANION GAP SERPL CALCULATED.3IONS-SCNC: 10 MMOL/L (ref 4–13)
AST SERPL W P-5'-P-CCNC: 16 U/L (ref 13–39)
B-HCG SERPL-ACNC: ABNORMAL MIU/ML (ref 0–5)
BASOPHILS # BLD AUTO: 0.05 THOUSANDS/ÂΜL (ref 0–0.1)
BASOPHILS NFR BLD AUTO: 0 % (ref 0–1)
BILIRUB SERPL-MCNC: 0.23 MG/DL (ref 0.2–1)
BILIRUB UR QL STRIP: NEGATIVE
BUN SERPL-MCNC: 10 MG/DL (ref 5–25)
CALCIUM SERPL-MCNC: 8.7 MG/DL (ref 8.4–10.2)
CHLORIDE SERPL-SCNC: 102 MMOL/L (ref 96–108)
CLARITY UR: CLEAR
CO2 SERPL-SCNC: 22 MMOL/L (ref 21–32)
COLOR UR: NORMAL
CREAT SERPL-MCNC: 0.44 MG/DL (ref 0.6–1.3)
EOSINOPHIL # BLD AUTO: 0.29 THOUSAND/ÂΜL (ref 0–0.61)
EOSINOPHIL NFR BLD AUTO: 3 % (ref 0–6)
ERYTHROCYTE [DISTWIDTH] IN BLOOD BY AUTOMATED COUNT: 13 % (ref 11.6–15.1)
GFR SERPL CREATININE-BSD FRML MDRD: 133 ML/MIN/1.73SQ M
GLUCOSE SERPL-MCNC: 99 MG/DL (ref 65–140)
GLUCOSE UR STRIP-MCNC: NEGATIVE MG/DL
HCT VFR BLD AUTO: 40.8 % (ref 34.8–46.1)
HGB BLD-MCNC: 13 G/DL (ref 11.5–15.4)
HGB UR QL STRIP.AUTO: NEGATIVE
IMM GRANULOCYTES # BLD AUTO: 0.09 THOUSAND/UL (ref 0–0.2)
IMM GRANULOCYTES NFR BLD AUTO: 1 % (ref 0–2)
KETONES UR STRIP-MCNC: NEGATIVE MG/DL
LEUKOCYTE ESTERASE UR QL STRIP: NEGATIVE
LIPASE SERPL-CCNC: 17 U/L (ref 11–82)
LYMPHOCYTES # BLD AUTO: 2.49 THOUSANDS/ÂΜL (ref 0.6–4.47)
LYMPHOCYTES NFR BLD AUTO: 21 % (ref 14–44)
MCH RBC QN AUTO: 26.5 PG (ref 26.8–34.3)
MCHC RBC AUTO-ENTMCNC: 31.9 G/DL (ref 31.4–37.4)
MCV RBC AUTO: 83 FL (ref 82–98)
MONOCYTES # BLD AUTO: 0.8 THOUSAND/ÂΜL (ref 0.17–1.22)
MONOCYTES NFR BLD AUTO: 7 % (ref 4–12)
NEUTROPHILS # BLD AUTO: 8.04 THOUSANDS/ÂΜL (ref 1.85–7.62)
NEUTS SEG NFR BLD AUTO: 68 % (ref 43–75)
NITRITE UR QL STRIP: NEGATIVE
NRBC BLD AUTO-RTO: 0 /100 WBCS
PH UR STRIP.AUTO: 6 [PH]
PLATELET # BLD AUTO: 306 THOUSANDS/UL (ref 149–390)
PMV BLD AUTO: 8.5 FL (ref 8.9–12.7)
POTASSIUM SERPL-SCNC: 3.9 MMOL/L (ref 3.5–5.3)
PROT SERPL-MCNC: 6.8 G/DL (ref 6.4–8.4)
PROT UR STRIP-MCNC: NEGATIVE MG/DL
RBC # BLD AUTO: 4.91 MILLION/UL (ref 3.81–5.12)
SODIUM SERPL-SCNC: 134 MMOL/L (ref 135–147)
SP GR UR STRIP.AUTO: 1.01 (ref 1–1.03)
UROBILINOGEN UR STRIP-ACNC: <2 MG/DL
WBC # BLD AUTO: 11.76 THOUSAND/UL (ref 4.31–10.16)

## 2024-11-18 PROCEDURE — 96360 HYDRATION IV INFUSION INIT: CPT

## 2024-11-18 PROCEDURE — 81003 URINALYSIS AUTO W/O SCOPE: CPT | Performed by: STUDENT IN AN ORGANIZED HEALTH CARE EDUCATION/TRAINING PROGRAM

## 2024-11-18 PROCEDURE — 85025 COMPLETE CBC W/AUTO DIFF WBC: CPT | Performed by: STUDENT IN AN ORGANIZED HEALTH CARE EDUCATION/TRAINING PROGRAM

## 2024-11-18 PROCEDURE — 76815 OB US LIMITED FETUS(S): CPT | Performed by: PHYSICIAN ASSISTANT

## 2024-11-18 PROCEDURE — 80053 COMPREHEN METABOLIC PANEL: CPT | Performed by: STUDENT IN AN ORGANIZED HEALTH CARE EDUCATION/TRAINING PROGRAM

## 2024-11-18 PROCEDURE — 84702 CHORIONIC GONADOTROPIN TEST: CPT | Performed by: PHYSICIAN ASSISTANT

## 2024-11-18 PROCEDURE — 87086 URINE CULTURE/COLONY COUNT: CPT | Performed by: STUDENT IN AN ORGANIZED HEALTH CARE EDUCATION/TRAINING PROGRAM

## 2024-11-18 PROCEDURE — 99284 EMERGENCY DEPT VISIT MOD MDM: CPT

## 2024-11-18 PROCEDURE — 83690 ASSAY OF LIPASE: CPT | Performed by: STUDENT IN AN ORGANIZED HEALTH CARE EDUCATION/TRAINING PROGRAM

## 2024-11-18 PROCEDURE — 96361 HYDRATE IV INFUSION ADD-ON: CPT

## 2024-11-18 PROCEDURE — 87077 CULTURE AEROBIC IDENTIFY: CPT | Performed by: STUDENT IN AN ORGANIZED HEALTH CARE EDUCATION/TRAINING PROGRAM

## 2024-11-18 PROCEDURE — 99284 EMERGENCY DEPT VISIT MOD MDM: CPT | Performed by: PHYSICIAN ASSISTANT

## 2024-11-18 PROCEDURE — 36415 COLL VENOUS BLD VENIPUNCTURE: CPT | Performed by: PHYSICIAN ASSISTANT

## 2024-11-18 PROCEDURE — 99285 EMERGENCY DEPT VISIT HI MDM: CPT | Performed by: STUDENT IN AN ORGANIZED HEALTH CARE EDUCATION/TRAINING PROGRAM

## 2024-11-18 RX ORDER — PYRIDOXINE HCL (VITAMIN B6) 50 MG
25 TABLET ORAL ONCE
Status: COMPLETED | OUTPATIENT
Start: 2024-11-18 | End: 2024-11-18

## 2024-11-18 RX ADMIN — DOXYLAMINE SUCCINATE 12.5 MG: 25 TABLET ORAL at 15:34

## 2024-11-18 RX ADMIN — SODIUM CHLORIDE 1000 ML: 0.9 INJECTION, SOLUTION INTRAVENOUS at 14:10

## 2024-11-18 RX ADMIN — PYRIDOXINE HCL TAB 50 MG 25 MG: 50 TAB at 15:40

## 2024-11-18 NOTE — TELEPHONE ENCOUNTER
Regarding: pt pregnant and said going to er  ----- Message from Ernestina GE sent at 11/18/2024 10:57 AM EST -----  Patient called and our system crashed and pt said she is going to er [facility] abdominal pain and some blood going to [facility].  Was unable to get in chart but verified and got her 3 identifiers.    can you please check on her at [phone]. Thank you

## 2024-11-18 NOTE — TELEPHONE ENCOUNTER
Pt called in this morning, 14w2d  reporting bright red spotting that started yesterday and moderate abdominal cramping. Has been on pelvic rest the last few days after having spotting after pap smear. Pt went to [place] ED this morning however they are sending her to [place] as they want her evaluated by the OBGYN team. They tried to do fetal dopplers but couldn't get accurate reading.     [facility] sent to On Call Provider Dr. Fantasma Josue

## 2024-11-18 NOTE — ED PROVIDER NOTES
ED Disposition       None          Assessment & Plan   {Hyperlinks  Risk Stratification - NIHSS - HEART SCORE - Fill out sepsis note and make sure you call [phone] if severe or septic shock:[id number]}    Medical Decision Making       Bedside point-of-care ultrasound was performed showing a moving fetus and heart rate in the 140s.    Medications   sodium chloride 0.9 % bolus 1,000 mL (1,000 mL Intravenous New Bag 11/18/24 1410)   doxylamine (UNISOM) tablet 12.5 mg (has no administration in time range)   pyridoxine (VITAMIN B6) tablet 25 mg (has no administration in time range)       ED Risk Strat Scores                           SBIRT 22yo+      Flowsheet Row Most Recent Value   MAR: How many times in the past year have you...    Used an illegal drug or used a prescription medication for non-medical reasons? Never Filed at: 11/18/2024 1200                            History of Present Illness   {Hyperlinks  History (Med, Surg, Fam, Social) - Current Medications - Allergies  :[id number]}    Chief Complaint   Patient presents with    Vaginal Bleeding - Pregnant     Pt reports she has been spotting for the past week , was sent here by OB to get an US        Past Medical History:   Diagnosis Date    Migraine       Past Surgical History:   Procedure Laterality Date    OTHER SURGICAL HISTORY Right     Arm Fractured bone at 6 y.o      Family History   Problem Relation Age of Onset    Thyroid disease Mother     Other Mother         Tachycardia    Asthma Mother     Heart attack Father         Pacemaker    Diabetes Father         T2DM    No Known Problems Sister     Other Sister         Tachycardia due to anxiety    No Known Problems Sister     Diabetes Maternal Grandmother     Stroke Maternal Grandmother     No Known Problems Paternal Grandmother     Diabetes Paternal Grandfather     Asthma Sister     Asthma Sister     Breast cancer Neg Hx     Colon cancer Neg Hx     Ovarian cancer Neg Hx       Social History     Tobacco  Use    Smoking status: Never    Smokeless tobacco: Never   Vaping Use    Vaping status: Never Used   Substance Use Topics    Alcohol use: Never    Drug use: Never      E-Cigarette/Vaping    E-Cigarette Use Never User       E-Cigarette/Vaping Substances    Nicotine No     THC No     CBD No     Flavoring No       I have reviewed and agree with the history as documented.     HPI    Patient is an 82-year-old female present emerged department for vaginal bleeding.  Patient noticed some vaginal spotting over the last 2 days.  She has had spotting since a Pap smear several days ago.  She reports having some nausea and vomiting over the last few days.  Vomit was nonbloody nonbilious.  Denies any change in bowel and bladder Darwin.  Reports intermittent periumbilical tenderness.  No reported fevers chills or URI symptoms at home.  History of migraines.    Review of Systems   Constitutional:  Negative for chills and fever.   HENT:  Negative for ear pain and sore throat.    Eyes:  Negative for pain and visual disturbance.   Respiratory:  Negative for cough and shortness of breath.    Cardiovascular:  Negative for chest pain and palpitations.   Gastrointestinal:  Positive for nausea and vomiting.   Genitourinary:  Positive for vaginal bleeding. Negative for dysuria and hematuria.   Musculoskeletal:  Negative for arthralgias and back pain.   Skin:  Negative for color change and rash.   Neurological:  Negative for seizures and syncope.   All other systems reviewed and are negative.          Objective   {Hyperlinks  Historical Vitals - Historical Labs - Chart Review/Microbiology - Last Echo - Code Status  :[id number]}    ED Triage Vitals [11/18/24 1158]   Temperature Pulse Blood Pressure Respirations SpO2 Patient Position - Orthostatic VS   97.6 °F (36.4 °C) 95 136/80 18 95 % Sitting      Temp Source Heart Rate Source BP Location FiO2 (%) Pain Score    Tympanic Monitor Left arm -- --      Vitals      Date and Time Temp Pulse SpO2  Resp BP Pain Score FACES Pain Rating User   11/18/24 1158 97.6 °F (36.4 °C) 95 95 % 18 136/80 -- -- LA            Physical Exam  Vitals and nursing note reviewed.   Constitutional:       General: She is not in acute distress.     Appearance: She is well-developed.   HENT:      Head: Normocephalic and atraumatic.   Eyes:      Conjunctiva/sclera: Conjunctivae normal.   Cardiovascular:      Rate and Rhythm: Normal rate and regular rhythm.      Heart sounds: No murmur heard.  Pulmonary:      Effort: Pulmonary effort is normal. No respiratory distress.      Breath sounds: Normal breath sounds.   Abdominal:      Palpations: Abdomen is soft.      Tenderness: There is no abdominal tenderness.   Musculoskeletal:         General: No swelling.      Cervical back: Neck supple.   Skin:     General: Skin is warm and dry.      Capillary Refill: Capillary refill takes less than 2 seconds.   Neurological:      General: No focal deficit present.      Mental Status: She is alert and oriented to person, place, and time.   Psychiatric:         Mood and Affect: Mood normal.         Results Reviewed       Procedure Component Value Units Date/Time    Comprehensive metabolic panel [[id number]]  (Abnormal) Collected: 11/18/24 1203    Lab Status: Final result Specimen: Blood from Arm, Right Updated: 11/18/24 1435     Sodium 134 mmol/L      Potassium 3.9 mmol/L      Chloride 102 mmol/L      CO2 22 mmol/L      ANION GAP 10 mmol/L      BUN 10 mg/dL      Creatinine 0.44 mg/dL      Glucose 99 mg/dL      Calcium 8.7 mg/dL      AST 16 U/L      ALT 14 U/L      Alkaline Phosphatase 37 U/L      Total Protein 6.8 g/dL      Albumin 3.6 g/dL      Total Bilirubin 0.23 mg/dL      eGFR 133 ml/min/1.73sq m     Narrative:      National Kidney Disease Foundation guidelines for Chronic Kidney Disease (CKD):     Stage 1 with normal or high GFR (GFR > 90 mL/min/1.73 square meters)    Stage 2 Mild CKD (GFR = 60-89 mL/min/1.73 square meters)    Stage 3A Moderate CKD  (GFR = 45-59 mL/min/1.73 square meters)    Stage 3B Moderate CKD (GFR = 30-44 mL/min/1.73 square meters)    Stage 4 Severe CKD (GFR = 15-29 mL/min/1.73 square meters)    Stage 5 End Stage CKD (GFR <15 mL/min/1.73 square meters)  Note: GFR calculation is accurate only with a steady state creatinine    Lipase [[id number]]  (Normal) Collected: 11/18/24 1203    Lab Status: Final result Specimen: Blood from Arm, Right Updated: 11/18/24 1435     Lipase 17 u/L     CBC and differential [[id number]]  (Abnormal) Collected: 11/18/24 1409    Lab Status: Final result Specimen: Blood from Arm, Right Updated: 11/18/24 1416     WBC 11.76 Thousand/uL      RBC 4.91 Million/uL      Hemoglobin 13.0 g/dL      Hematocrit 40.8 %      MCV 83 fL      MCH 26.5 pg      MCHC 31.9 g/dL      RDW 13.0 %      MPV 8.5 fL      Platelets 306 Thousands/uL      nRBC 0 /100 WBCs      Segmented % 68 %      Immature Grans % 1 %      Lymphocytes % 21 %      Monocytes % 7 %      Eosinophils Relative 3 %      Basophils Relative 0 %      Absolute Neutrophils 8.04 Thousands/µL      Absolute Immature Grans 0.09 Thousand/uL      Absolute Lymphocytes 2.49 Thousands/µL      Absolute Monocytes 0.80 Thousand/µL      Eosinophils Absolute 0.29 Thousand/µL      Basophils Absolute 0.05 Thousands/µL     hCG, quantitative [[id number]]  (Abnormal) Collected: 11/18/24 1203    Lab Status: Final result Specimen: Blood from Arm, Right Updated: 11/18/24 1300     HCG, Quant 73,365.1 mIU/mL     Narrative:       Expected Ranges:    HCG results between 5.0 and 25.0 mIU/mL may be indicative of early pregnancy but should be interpreted in light of the total clinical presentation.    HCG can rise to detectable levels in daniel and post menopausal women (0-11.6 mIU/mL).     Approximate               Approximate HCG  Gestation age          Concentration ( mIU/mL)  _____________          ______________________   Weeks                      HCG values  0.2-1                       5-50  1-2                            2-3                         100-5000  3-4                         500-30833  4-5                         1000-42840  5-6                         [id number]  6-8                         [id number]  8-12                        [id number]      UA w Reflex to Microscopic w Reflex to Culture [[id number]] Collected: 24 121    Lab Status: Final result Specimen: Urine, Clean Catch Updated: 24 1232     Color, UA Light Yellow     Clarity, UA Clear     Specific Gravity, UA 1.014     pH, UA 6.0     Leukocytes, UA Negative     Nitrite, UA Negative     Protein, UA Negative mg/dl      Glucose, UA Negative mg/dl      Ketones, UA Negative mg/dl      Urobilinogen, UA <2.0 mg/dl      Bilirubin, UA Negative     Occult Blood, UA Negative     URINE COMMENT --    Urine culture [[id number]] Collected: 24    Lab Status: In process Specimen: Urine, Clean Catch Updated: 24 1232            No orders to display       Procedures    ED Medication and Procedure Management   Prior to Admission Medications   Prescriptions Last Dose Informant Patient Reported? Taking?   Doxylamine-Pyridoxine (Diclegis) 10-10 MG TBEC   No No   Si delayed-release tablets PO on a daily basis at bedtime  If symptoms not adequately controlled, increase dose to 4 tablets each day (1 tab in AM, 1 tab mid-afternoon, and 2 tabs at bedtime)   Prenatal Vit-Fe Fumarate-FA (PRENATAL PO)   Yes No   Sig: Take by mouth   aspirin (ECOTRIN LOW STRENGTH) 81 mg EC tablet   No No   Sig: Take 2 tablets (162 mg total) by mouth daily Stop at 36 weeks.  Contact your OB or MFM with any side effects.  See [email or web address]   famotidine (PEPCID) 20 mg tablet   No No   Sig: Take 1 tablet (20 mg total) by mouth 2 (two) times a day   Patient not taking: Reported on 2024   metoclopramide (Reglan) 10 mg tablet   No No   Sig: Take 1 tablet (10 mg total) by mouth every 6 (six) hours as needed (nausea/vomiting)    Patient not taking: Reported on 11/14/2024      Facility-Administered Medications: None     Patient's Medications   Discharge Prescriptions    No medications on file     No discharge procedures on file.  ED SEPSIS DOCUMENTATION

## 2024-11-18 NOTE — TELEPHONE ENCOUNTER
"RN placed a call to patient to follow up on symptoms.Patient is 14w2d, , started with bright red spotting which started yesterday, and now experiencing 4/10 abdominal cramping. Pt has been on pelvic rest the last few days due to spotting after pap smear on 24 which since subsided on 24 - denies having sexual intercourse, pelvic exam or engaging in strenuous physical activity which may have triggered bleeding again yesterday. Pt states she is on her way to the ER at [facility] which is nearest to her for further evaluation as she is very concerned and upset.     RN notified provider on call, Dr. Meek and placed on [facility] tracking board.     Answer Assessment - Initial Assessment Questions  1. ONSET: \"When did this bleeding start?\"        Yesterday  2. BLEEDING SEVERITY: \"Describe the bleeding that you are having.\" \"How much bleeding is there?\"       Spotting  3. ABDOMEN PAIN: \"Do you have any pain?\" \"How bad is the pain?\"  (e.g., Scale 0-10; none, mild, moderate, or severe)      4/10  4. PREGNANCY: \"Do you know how many weeks or months pregnant you are?\" \"When was the first day of your last normal menstrual period?\"      14w2d  5. ULTRASOUND: \"Have you had an ultrasound during this pregnancy?\"  Note: To confirm intrauterine pregnancy, placenta location.      In office  6. HEMODYNAMIC STATUS: \"Are you weak or feeling lightheaded?\" If Yes, ask: \"Can you stand and walk normally?\"       Dizziness ongoing  7. OTHER SYMPTOMS: \"What other symptoms are you having with the bleeding?\" (e.g., passed tissue, vaginal discharge, fever, menstrual-type cramps)      Morning sickness    Protocols used: Pregnancy - Vaginal Bleeding Less Than 20 Weeks EGA-Adult-[place]    "

## 2024-11-18 NOTE — DISCHARGE INSTRUCTIONS
Continue using medication like Tylenol as needed for discomfort.  Continue stay well-hydrated.  You can continue taking vitamins to help with nausea and vomiting.    Follow-up with your OB/GYN for reevaluation.    Return for any new or worsening symptoms.

## 2024-11-19 NOTE — ED PROVIDER NOTES
"Time reflects when diagnosis was documented in both MDM as applicable and the Disposition within this note       Time User Action Codes Description Comment    2024  7:51 PM Edy Taveras Add [O46.90] Vaginal bleeding in pregnancy           ED Disposition       ED Disposition   Discharge    Condition   Stable    Date/Time     8:35 PM    Comment   Araceli Levy discharge to home/self care.                   Assessment & Plan       Medical Decision Making  Patient is a 34-year-old  with report of 1 miscarriage, history of migraine, and no significant past surgical history that presents to the emergency department with vaginal spotting this morning and went to [place] ED for evaluation.    Patient hemodynamically stable and afebrile  No sirs  Discussion with Dr. Putnam via epic chat with indication to perform bedside ultrasound and have patient follow-up with PCP and OB/GYN as an outpatient; patient clinical labs stable, normal H&H  Bedside ultrasound fetal heart rate 152  Follow-up with outpatient OB/GYN  Follow-up with PCP  Follow up with emergency department if symptoms persist or exacerbate  Patient demonstrates verbal understanding of all clinical laboratory and imaging findings, discharge instructions, follow-up, and verbally agrees with current treatment plan with teach back    *Due to voice recognition software, sound alike and misspelled words may be contained in the documentation*        ED Course as of 24 0759      [birth year]28 Pap smear on the 24; vaginal bleeding til the 24 sent to [facility]  Hx off miscarriage  Working today- vaginal spotting- sent to [facility] clinical blood work and urine; \"I cannot get an accurate reading with baby heart beat.\"  Pt sent to [facility] for formalized u/s at this time.   1936  with a history of miscarriage x 1 pt reports hx of miscarriage prior to 8 week gestation     Text to  " [place], OBGYN resident with indication to do bedside ultrasound; there discussed patient case with patient reports some vaginal spotting on a tissue and does not endorse use of tampon or vaginal pad use.  Patient bedside ultrasound with fetal heart tones 152 by myself.  Further OB/GYN resident; Dr. Putnam and discussed patient case, reported ultrasound findings, with recommendation to discharge patient to home care and outpatient follow-up OB/GYN.       Medications - No data to display    ED Risk Strat Scores                           SBIRT 20yo+      Flowsheet Row Most Recent Value   Initial Alcohol Screen: US AUDIT-C     1. How often do you have a drink containing alcohol? 0 Filed at: 2024   2. How many drinks containing alcohol do you have on a typical day you are drinking?  0 Filed at: 2024   3a. Male UNDER 65: How often do you have five or more drinks on one occasion? 0 Filed at: 2024   3b. FEMALE Any Age, or MALE 65+: How often do you have 4 or more drinks on one occassion? 0 Filed at: 2024   Audit-C Score 0 Filed at: 2024   MAR: How many times in the past year have you...    Used an illegal drug or used a prescription medication for non-medical reasons? Never Filed at: 2024                            History of Present Illness       Chief Complaint   Patient presents with    Vaginal Bleeding     Pt has had bleeding since pap sma. Was seen at [place] ER today and pt reports she was told to come here for eval.      Patient is a 34-year-old  with report of 1 miscarriage, history of migraine, and no significant past surgical history that presents to the emergency department with vaginal spotting this morning and went to [place] ED for evaluation.  Patient was recommended by the physician to come to the ED for assessment by OB/GYN.  Patient blood labs leukocytosis of 11.76, no bandemia, hyponatremia 134, normal kidney function  serum  "creatinine 0.44, urinalysis indicative of urinary tract infection, hCG quantitative 73,365.1.  Patient with recent visit to OB/GYN 11/11/2024 13 weeks gestation of pregnancy, fetal heart rate at that time 160s on bedside ultrasound physician further reporting that patient may take Zofran and Diclegis safely in pregnancy per note; with 1 hour and 3-hour glucose tolerance test next week from that date, 11/11/2024.  Patient does report uncomfortable umbilical pain which patient states \"I was told that it was the umbilical cord.\"  Patient denies palliative and provocative factors.  Patient denies noneffective treatment.  Patient denies fevers, chills, nausea, vomiting, diarrhea, constipation and urinary symptoms.  Patient denies recent fall and recent trauma.  Patient denies sick contacts and recent travel.  Patient denies chest pain, and shortness of breath.      Past Medical History:   Diagnosis Date    Migraine       Past Surgical History:   Procedure Laterality Date    OTHER SURGICAL HISTORY Right     Arm Fractured bone at 6 y.o      Family History   Problem Relation Age of Onset    Thyroid disease Mother     Other Mother         Tachycardia    Asthma Mother     Heart attack Father         Pacemaker    Diabetes Father         T2DM    No Known Problems Sister     Other Sister         Tachycardia due to anxiety    No Known Problems Sister     Diabetes Maternal Grandmother     Stroke Maternal Grandmother     No Known Problems Paternal Grandmother     Diabetes Paternal Grandfather     Asthma Sister     Asthma Sister     Breast cancer Neg Hx     Colon cancer Neg Hx     Ovarian cancer Neg Hx       Social History     Tobacco Use    Smoking status: Never    Smokeless tobacco: Never   Vaping Use    Vaping status: Never Used   Substance Use Topics    Alcohol use: Never    Drug use: Never      E-Cigarette/Vaping    E-Cigarette Use Never User       E-Cigarette/Vaping Substances    Nicotine No     THC No     CBD No     Flavoring " No       I have reviewed and agree with the history as documented.       History provided by:  Patient   used: No    Vaginal Bleeding  Associated symptoms: no abdominal pain, no back pain, no dizziness, no dysuria, no fever and no nausea        Review of Systems   Constitutional:  Negative for activity change, appetite change, chills and fever.   HENT:  Negative for congestion, ear pain, postnasal drip, rhinorrhea, sinus pressure, sinus pain, sore throat and tinnitus.    Eyes:  Negative for photophobia, pain and visual disturbance.   Respiratory:  Negative for cough, chest tightness and shortness of breath.    Cardiovascular:  Negative for chest pain and palpitations.   Gastrointestinal:  Negative for abdominal pain, constipation, diarrhea, nausea and vomiting.   Genitourinary:  Positive for vaginal bleeding. Negative for difficulty urinating, dysuria, flank pain, frequency, hematuria and urgency.   Musculoskeletal:  Negative for arthralgias, back pain, gait problem, neck pain and neck stiffness.   Skin:  Negative for color change, pallor and rash.   Allergic/Immunologic: Negative for environmental allergies and food allergies.   Neurological:  Negative for dizziness, seizures, syncope, weakness, numbness and headaches.   Psychiatric/Behavioral:  Negative for confusion.    All other systems reviewed and are negative.          Objective       ED Triage Vitals [11/18/24 1744]   Temperature Pulse Blood Pressure Respirations SpO2 Patient Position - Orthostatic VS   98 °F (36.7 °C) 92 126/50 18 98 % Sitting      Temp Source Heart Rate Source BP Location FiO2 (%) Pain Score    Oral Monitor Right arm -- --      Vitals      Date and Time Temp Pulse SpO2 Resp BP Pain Score FACES Pain Rating User   11/18/24 1744 98 °F (36.7 °C) 92 98 % 18 126/50 -- -- AK            Physical Exam  Vitals and nursing note reviewed.   Constitutional:       General: She is awake.      Appearance: Normal appearance. She is  well-developed and normal weight. She is not ill-appearing, toxic-appearing or diaphoretic.   HENT:      Head: Normocephalic and atraumatic.      Jaw: There is normal jaw occlusion.      Right Ear: Hearing and external ear normal. No decreased hearing noted. No drainage, swelling or tenderness. No mastoid tenderness.      Left Ear: Hearing and external ear normal. No decreased hearing noted. No drainage, swelling or tenderness. No mastoid tenderness.      Nose: Nose normal.      Mouth/Throat:      Lips: Pink.      Mouth: Mucous membranes are moist.      Pharynx: Oropharynx is clear. Uvula midline.   Eyes:      General: Lids are normal. Vision grossly intact. Gaze aligned appropriately.         Right eye: No discharge.         Left eye: No discharge.      Extraocular Movements: Extraocular movements intact.      Conjunctiva/sclera: Conjunctivae normal.      Pupils: Pupils are equal, round, and reactive to light.   Neck:      Vascular: No JVD.      Trachea: Trachea and phonation normal. No tracheal tenderness or tracheal deviation.   Cardiovascular:      Rate and Rhythm: Normal rate and regular rhythm.      Pulses: Normal pulses.           Radial pulses are 2+ on the right side and 2+ on the left side.        Posterior tibial pulses are 2+ on the right side and 2+ on the left side.      Heart sounds: Normal heart sounds.   Pulmonary:      Effort: Pulmonary effort is normal.      Breath sounds: Normal breath sounds and air entry. No stridor. No decreased breath sounds, wheezing, rhonchi or rales.   Abdominal:      General: Abdomen is flat. Bowel sounds are normal. There is no distension.      Palpations: Abdomen is soft. Abdomen is not rigid.      Tenderness: There is no abdominal tenderness. There is no right CVA tenderness, left CVA tenderness, guarding or rebound.   Musculoskeletal:         General: Normal range of motion.      Cervical back: Full passive range of motion without pain, normal range of motion and neck  supple. No rigidity. No spinous process tenderness or muscular tenderness. Normal range of motion.   Feet:      Right foot:      Toenail Condition: Right toenails are normal.      Left foot:      Toenail Condition: Left toenails are normal.   Lymphadenopathy:      Head:      Right side of head: No submental, submandibular, tonsillar, preauricular, posterior auricular or occipital adenopathy.      Left side of head: No submental, submandibular, tonsillar, preauricular, posterior auricular or occipital adenopathy.      Cervical: No cervical adenopathy.      Right cervical: No superficial, deep or posterior cervical adenopathy.     Left cervical: No superficial, deep or posterior cervical adenopathy.   Skin:     General: Skin is warm.      Capillary Refill: Capillary refill takes less than 2 seconds.      Findings: No rash.   Neurological:      General: No focal deficit present.      Mental Status: She is alert and oriented to person, place, and time. Mental status is at baseline.      GCS: GCS eye subscore is 4. GCS verbal subscore is 5. GCS motor subscore is 6.      Sensory: No sensory deficit.   Psychiatric:         Attention and Perception: Attention normal.         Mood and Affect: Mood normal.         Speech: Speech normal.         Behavior: Behavior normal. Behavior is cooperative.         Thought Content: Thought content normal.         Judgment: Judgment normal.         Results Reviewed       None            No orders to display       POC Pelvic US    Date/Time: 11/18/2024 7:53 AM    Performed by: Edy Taveras PA-C  Authorized by: Edy Taveras PA-C    Patient location:  ED  Other Assisting Provider: No    Procedure details:     Exam Type:  Diagnostic    Indications: evaluate for IUP and pregnant with vaginal bleeding      Indications comment:  Vaginal spotting    Assessment for: confirm intrauterine pregnancy      Views obtained: uterus (transverse and sagittal)      Image quality: diagnostic      Image  availability:  Images available in PACS and still images obtained  Uterine findings:     Endometrial stripe: identified      Intrauterine pregnancy: identified      Single gestation: identified      Gestational sac: identified      Yolk sac: identified      Fetal pole: identified      Fetal heart rate: identified      Fetal heart rate (bpm):  152  Interpretation:     Ultrasound impressions: normal      Pregnancy findings: intrauterine pregnancy (IUP)        ED Medication and Procedure Management   Prior to Admission Medications   Prescriptions Last Dose Informant Patient Reported? Taking?   Doxylamine-Pyridoxine (Diclegis) 10-10 MG TBEC   No No   Si delayed-release tablets PO on a daily basis at bedtime  If symptoms not adequately controlled, increase dose to 4 tablets each day (1 tab in AM, 1 tab mid-afternoon, and 2 tabs at bedtime)   Prenatal Vit-Fe Fumarate-FA (PRENATAL PO)   Yes No   Sig: Take by mouth   aspirin (ECOTRIN LOW STRENGTH) 81 mg EC tablet   No No   Sig: Take 2 tablets (162 mg total) by mouth daily Stop at 36 weeks.  Contact your OB or MFM with any side effects.  See [email or web address]://www.preeclampsia.org/[email or web address]   famotidine (PEPCID) 20 mg tablet   No No   Sig: Take 1 tablet (20 mg total) by mouth 2 (two) times a day   Patient not taking: Reported on 2024   metoclopramide (Reglan) 10 mg tablet   No No   Sig: Take 1 tablet (10 mg total) by mouth every 6 (six) hours as needed (nausea/vomiting)   Patient not taking: Reported on 2024      Facility-Administered Medications: None     Discharge Medication List as of 2024  8:36 PM        CONTINUE these medications which have NOT CHANGED    Details   aspirin (ECOTRIN LOW STRENGTH) 81 mg EC tablet Take 2 tablets (162 mg total) by mouth daily Stop at 36 weeks.  Contact your OB or MFM with any side effects.  See [email or web address].preeclampsia.[email or web address], Starting Fri 2024, Normal      Doxylamine-Pyridoxine (Diclegis) 10-10 MG TBEC 2 delayed-release  tablets PO on a daily basis at bedtime  If symptoms not adequately controlled, increase dose to 4 tablets each day (1 tab in AM, 1 tab mid-afternoon, and 2 tabs at bedtime), Print      famotidine (PEPCID) 20 mg tablet Take 1 tablet (20 mg total) by mouth 2 (two) times a day, Starting Fri 10/25/2024, Normal      metoclopramide (Reglan) 10 mg tablet Take 1 tablet (10 mg total) by mouth every 6 (six) hours as needed (nausea/vomiting), Starting Tue 10/8/2024, Normal      Prenatal Vit-Fe Fumarate-FA (PRENATAL PO) Take by mouth, Historical Med           No discharge procedures on file.  ED SEPSIS DOCUMENTATION   Time reflects when diagnosis was documented in both MDM as applicable and the Disposition within this note       Time User Action Codes Description Comment    11/18/2024  7:51 PM Edy Taveras Add [O46.90] Vaginal bleeding in pregnancy                  Edy Taveras PA-C  11/19/24 0759       Edy Taveras PA-C  11/19/24 0759

## 2024-11-20 LAB
BACTERIA UR CULT: ABNORMAL
BACTERIA UR CULT: ABNORMAL

## 2024-11-21 ENCOUNTER — NURSE TRIAGE (OUTPATIENT)
Age: 34
End: 2024-11-21

## 2024-11-21 NOTE — TELEPHONE ENCOUNTER
Regarding: Abnormal Results  ----- Message from Concetta JO sent at 11/21/2024  9:04 AM EST -----  Pt wanted to add to please call her back asap because if the results are positive for what she believes she does not want it to effect pregnancy.    ----- Message from Concetta JO sent at 11/21/2024  9:01 AM EST -----  Pt called asking if she can go over abnormal results she received in [facility] with a provider or RN no CTS available please call pt back.

## 2024-11-21 NOTE — TELEPHONE ENCOUNTER
"RN reviewed results from ER visit. Results have not been reviewed yet by OB provider. Per ER note, UA indicative of UTI, therefore culture sent. RN sent ESC to provider on call, Dr. Pérez, requesting review of results in order for RN to call patient with recommendations.     ADDENDUM: Per provider, \"Her urine culture is positive for skin odilon, so there's no need to treat.\"     RN placed a call to patient to follow up on provider's recommendations. Pt states has no specific UTI symptoms. Also notes that vaginal bleeding has subsided since ER visit. Patient is very upset, however, that [place] [facility] was unable to find heartbeat of baby and sent her to [place]. Pt states OB office advised to go to nearest ER which was [place]. [facility] staff advised pt should always go to her OB or hospital where she should deliver as they are more equipped. However, patient questioned them as she lives closer to [facility] and was bleeding.     Patient upset and asking if possible to be brought in to office for sooner appointment, even if it is just for quick US so she can visualize baby on screen, as ER only did dopplers. Patient can come in any day or [place] [facility] [place] or [place]. RN advised will route to office clerical teams to see if possible to arrange for patient's peace of mind. Pt thankful.     " Anemia

## 2024-11-25 ENCOUNTER — ULTRASOUND (OUTPATIENT)
Dept: OBGYN CLINIC | Facility: MEDICAL CENTER | Age: 34
End: 2024-11-25
Payer: COMMERCIAL

## 2024-11-25 VITALS
BODY MASS INDEX: 42.27 KG/M2 | OXYGEN SATURATION: 98 % | HEART RATE: 102 BPM | SYSTOLIC BLOOD PRESSURE: 110 MMHG | DIASTOLIC BLOOD PRESSURE: 80 MMHG | WEIGHT: 254 LBS

## 2024-11-25 DIAGNOSIS — O46.90 VAGINAL BLEEDING IN PREGNANCY: Primary | ICD-10-CM

## 2024-11-25 PROCEDURE — 99213 OFFICE O/P EST LOW 20 MIN: CPT | Performed by: STUDENT IN AN ORGANIZED HEALTH CARE EDUCATION/TRAINING PROGRAM

## 2024-11-25 PROCEDURE — 76817 TRANSVAGINAL US OBSTETRIC: CPT | Performed by: STUDENT IN AN ORGANIZED HEALTH CARE EDUCATION/TRAINING PROGRAM

## 2024-11-25 NOTE — ASSESSMENT & PLAN NOTE
Resolved after pap.   US today with good movement and normal FHT.  Return for normal scheduled visit.

## 2024-11-25 NOTE — PROGRESS NOTES
Vaginal bleeding in pregnancy  Resolved after pap.   US today with good movement and normal FHT.  Return for normal scheduled visit.

## 2024-12-04 ENCOUNTER — APPOINTMENT (OUTPATIENT)
Age: 34
End: 2024-12-04
Payer: COMMERCIAL

## 2024-12-04 ENCOUNTER — ROUTINE PRENATAL (OUTPATIENT)
Age: 34
End: 2024-12-04

## 2024-12-04 ENCOUNTER — TELEPHONE (OUTPATIENT)
Age: 34
End: 2024-12-04

## 2024-12-04 VITALS
HEIGHT: 65 IN | SYSTOLIC BLOOD PRESSURE: 110 MMHG | HEART RATE: 101 BPM | WEIGHT: 255.4 LBS | DIASTOLIC BLOOD PRESSURE: 78 MMHG | BODY MASS INDEX: 42.55 KG/M2

## 2024-12-04 DIAGNOSIS — Z36.9 ENCOUNTER FOR ANTENATAL SCREENING: ICD-10-CM

## 2024-12-04 DIAGNOSIS — O99.810 ABNORMAL GLUCOSE TOLERANCE AFFECTING PREGNANCY, ANTEPARTUM: ICD-10-CM

## 2024-12-04 DIAGNOSIS — Z34.82 PRENATAL CARE, SUBSEQUENT PREGNANCY, SECOND TRIMESTER: ICD-10-CM

## 2024-12-04 DIAGNOSIS — Z3A.16 16 WEEKS GESTATION OF PREGNANCY: Primary | ICD-10-CM

## 2024-12-04 PROCEDURE — 82105 ALPHA-FETOPROTEIN SERUM: CPT

## 2024-12-04 PROCEDURE — 36415 COLL VENOUS BLD VENIPUNCTURE: CPT

## 2024-12-04 PROCEDURE — PBNCHG PB NO CHARGE PLACEHOLDER: Performed by: OBSTETRICS & GYNECOLOGY

## 2024-12-04 NOTE — PROGRESS NOTES
Patient presents for a routine prenatal visit    16W4D  No LOF,bleeding,discharge or cramping.  Having migraines for the past five days and chest pain the last 2 days. Chest pain did stop but migraine still persists.  AFP already ordered, not completed yet.  Did not complete 3hr GTT yet.

## 2024-12-04 NOTE — PROGRESS NOTES
"Problem List Items Addressed This Visit       16 weeks gestation of pregnancy - Primary    Patient very agitated at [facility] today as she was requesting an ultrasound.  Reviewed that at routine prenatal visits we listen to the baby with the fetal doppler to assess fetal well being and the role of ultrasound in pregnancy.  She has had ultrasounds for dating, NT and at multiple ER visits and on  after she presented for vaginal bleeding  She stated \"who is above you to speak with because you're just a gynecologist\" and requested to speak with the  and to reschedule her visit.   No other concerns or prenatal care was reviewed/discussed today   She will return for routine prenatal care           Abnormal glucose tolerance affecting pregnancy, antepartum    Elevated 1h  Still needs to complete 3h gtt  This was not addressed today as patient requested to end her visit          Other Visit Diagnoses         Prenatal care, subsequent pregnancy, second trimester        Relevant Orders    POCT urine dip            Araceli Levy is a 34 y.o.  at 16w4d who presents today for routine prenatal visit.     /78 (BP Location: Left arm, Patient Position: Sitting, Cuff Size: Large)   Pulse 101   Ht 5' 5\" (1.651 m)   Wt 116 kg (255 lb 6.4 oz)   LMP 2024 (Exact Date)   BMI 42.50 kg/m²     FHR not obtained today as patient requested to end her visit    "

## 2024-12-04 NOTE — TELEPHONE ENCOUNTER
"Call back to pt. She is wondering why she now has a diagnosis of \"abnormal glucose tolerance affecting pregnancy, antepartum.\" Advised this is from her elevated 1 hour glucose test. Pt wondering why it was just added today and not when she had the 1 hour glucose test. Advised unsure why it was only added now, but it is referring to that abnormal one hour. Pt is aware she needs to do the 3 hour. States the lab near her has no openings for a few weeks, when she is closer to 19-20 weeks gestation. She does plan to complete this test at that time. Pt thankful for the information.     "

## 2024-12-04 NOTE — ASSESSMENT & PLAN NOTE
Elevated 1h  Still needs to complete 3h gtt  This was not addressed today as patient requested to end her visit

## 2024-12-04 NOTE — ASSESSMENT & PLAN NOTE
"Patient very agitated at [facility] today as she was requesting an ultrasound.  Reviewed that at routine prenatal visits we listen to the baby with the fetal doppler to assess fetal well being and the role of ultrasound in pregnancy.  She has had ultrasounds for dating, NT and at multiple ER visits and on 11/25 after she presented for vaginal bleeding  She stated \"who is above you to speak with because you're just a gynecologist\" and requested to speak with the  and to reschedule her visit.   No other concerns or prenatal care was reviewed/discussed today   She will return for routine prenatal care    "

## 2024-12-05 ENCOUNTER — TELEPHONE (OUTPATIENT)
Dept: OBGYN CLINIC | Facility: CLINIC | Age: 34
End: 2024-12-05

## 2024-12-05 NOTE — TELEPHONE ENCOUNTER
Patient was called for 2 trimester follow up.  Left a voicemail to call the office with any questions or concerns. Patient was reminded that she can message me via portal as well.

## 2024-12-06 ENCOUNTER — ROUTINE PRENATAL (OUTPATIENT)
Age: 34
End: 2024-12-06
Payer: COMMERCIAL

## 2024-12-06 VITALS
HEIGHT: 65 IN | DIASTOLIC BLOOD PRESSURE: 76 MMHG | WEIGHT: 252 LBS | BODY MASS INDEX: 41.99 KG/M2 | SYSTOLIC BLOOD PRESSURE: 120 MMHG

## 2024-12-06 DIAGNOSIS — O99.210 OBESITY IN PREGNANCY: ICD-10-CM

## 2024-12-06 DIAGNOSIS — O99.810 ABNORMAL GLUCOSE TOLERANCE AFFECTING PREGNANCY, ANTEPARTUM: ICD-10-CM

## 2024-12-06 DIAGNOSIS — F41.9 ANXIETY IN PREGNANCY, ANTEPARTUM: ICD-10-CM

## 2024-12-06 DIAGNOSIS — O99.340 ANXIETY IN PREGNANCY, ANTEPARTUM: ICD-10-CM

## 2024-12-06 DIAGNOSIS — O46.90 VAGINAL BLEEDING IN PREGNANCY: ICD-10-CM

## 2024-12-06 DIAGNOSIS — Z3A.16 16 WEEKS GESTATION OF PREGNANCY: Primary | ICD-10-CM

## 2024-12-06 PROBLEM — I24.89 DEMAND ISCHEMIA (HCC): Status: RESOLVED | Noted: 2019-06-07 | Resolved: 2024-12-06

## 2024-12-06 PROBLEM — I51.4 MYOCARDITIS (HCC): Status: RESOLVED | Noted: 2019-06-07 | Resolved: 2024-12-06

## 2024-12-06 PROCEDURE — 99213 OFFICE O/P EST LOW 20 MIN: CPT | Performed by: OBSTETRICS & GYNECOLOGY

## 2024-12-06 NOTE — ASSESSMENT & PLAN NOTE
She is doing well overall.  An ultrasound was obtained to assess the fetal heart rate which I counseled her is not typical at each visit.  She has her anatomy ultrasound scheduled.  We also went over the increased scheduling of ultrasounds with a growth ultrasound in the third trimester and weekly  testing after 34 weeks.    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  The patient does not believe that she has ever had any heart disease or myocarditis.  She did feel like she was sick with COVID however this was listed prior to COVID.  There are no notes about it.  She states that she seen her family doctor and there were no concerns.

## 2024-12-06 NOTE — ASSESSMENT & PLAN NOTE
We did discuss activity in pregnancy.  I do recommend her continuing to be active.  We did discuss limitations with pregnancy.  In addition she is taking a baby aspirin.  She was concerned about preeclampsia.

## 2024-12-06 NOTE — PROGRESS NOTES
Problem List Items Addressed This Visit       16 weeks gestation of pregnancy - Primary    She is doing well overall.  An ultrasound was obtained to assess the fetal heart rate which I counseled her is not typical at each visit.  She has her anatomy ultrasound scheduled.  We also went over the increased scheduling of ultrasounds with a growth ultrasound in the third trimester and weekly  testing after 34 weeks.    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  The patient does not believe that she has ever had any heart disease or myocarditis.  She did feel like she was sick with COVID however this was listed prior to COVID.  There are no notes about it.  She states that she seen her family doctor and there were no concerns.         Obesity in pregnancy    We did discuss activity in pregnancy.  I do recommend her continuing to be active.  We did discuss limitations with pregnancy.  In addition she is taking a baby aspirin.  She was concerned about preeclampsia.         RESOLVED: Vaginal bleeding in pregnancy    Resolved         Abnormal glucose tolerance affecting pregnancy, antepartum    I encouraged her to complete her glucose tolerance test sooner rather than later.  She will schedule her for early next week.  I also discussed that this is normal she will need to repeat 1 at 24 to 28 weeks          Other Visit Diagnoses         Anxiety in pregnancy, antepartum        Relevant Orders    Ambulatory Referral to Psych Services

## 2024-12-06 NOTE — ASSESSMENT & PLAN NOTE
I encouraged her to complete her glucose tolerance test sooner rather than later.  She will schedule her for early next week.  I also discussed that this is normal she will need to repeat 1 at 24 to 28 weeks

## 2024-12-07 LAB
2ND TRIMESTER 4 SCREEN SERPL-IMP: NORMAL
AFP ADJ MOM SERPL: 1.22
AFP INTERP AMN-IMP: NORMAL
AFP INTERP SERPL-IMP: NORMAL
AFP INTERP SERPL-IMP: NORMAL
AFP SERPL-MCNC: 32.2 NG/ML
AGE AT DELIVERY: 34.1 YR
GA METHOD: NORMAL
GA: 16.6 WEEKS
IDDM PATIENT QL: NO
MULTIPLE PREGNANCY: NO
NEURAL TUBE DEFECT RISK FETUS: 6110 %

## 2024-12-16 ENCOUNTER — TELEPHONE (OUTPATIENT)
Age: 34
End: 2024-12-16

## 2024-12-16 NOTE — TELEPHONE ENCOUNTER
Patient rescheduled her 3/11/2025 ob appointment for 12/17/2024. Can you please check if this appointment is ok ?  Thank you

## 2024-12-17 ENCOUNTER — ROUTINE PRENATAL (OUTPATIENT)
Dept: OBGYN CLINIC | Facility: MEDICAL CENTER | Age: 34
End: 2024-12-17
Payer: COMMERCIAL

## 2024-12-17 VITALS
BODY MASS INDEX: 43.32 KG/M2 | OXYGEN SATURATION: 99 % | HEIGHT: 65 IN | DIASTOLIC BLOOD PRESSURE: 84 MMHG | HEART RATE: 93 BPM | WEIGHT: 260 LBS | SYSTOLIC BLOOD PRESSURE: 122 MMHG

## 2024-12-17 DIAGNOSIS — Z34.82 PRENATAL CARE, SUBSEQUENT PREGNANCY, SECOND TRIMESTER: Primary | ICD-10-CM

## 2024-12-17 DIAGNOSIS — Z3A.18 18 WEEKS GESTATION OF PREGNANCY: ICD-10-CM

## 2024-12-17 LAB
SL AMB  POCT GLUCOSE, UA: NORMAL
SL AMB POCT URINE PROTEIN: NORMAL

## 2024-12-17 PROCEDURE — 81002 URINALYSIS NONAUTO W/O SCOPE: CPT | Performed by: STUDENT IN AN ORGANIZED HEALTH CARE EDUCATION/TRAINING PROGRAM

## 2024-12-17 PROCEDURE — 99212 OFFICE O/P EST SF 10 MIN: CPT | Performed by: STUDENT IN AN ORGANIZED HEALTH CARE EDUCATION/TRAINING PROGRAM

## 2024-12-17 NOTE — ASSESSMENT & PLAN NOTE
35 yo  at 18+3 here for routine ob visit. She is having some lower pelvic groin discomfort. Occasional RUQ discomfort. We reviewed round ligament pain. We discussed possible muscle strain vs gall bladder issue as the pain was temporary. We reviewed precautions to present to the hospital and was encouraged to come to [place] for any needs given GA. No leaking or bleeding. Feeling some flutters. She will return in 2 wks (already scheduled I did advise 4 is typical) and had 20 wk scan scheduled.

## 2024-12-17 NOTE — PROGRESS NOTES
18 weeks gestation of pregnancy  33 yo  at 18+3 here for routine ob visit. She is having some lower pelvic groin discomfort. Occasional RUQ discomfort. We reviewed round ligament pain. We discussed possible muscle strain vs gall bladder issue as the pain was temporary. We reviewed precautions to present to the hospital and was encouraged to come to [facility] for any needs given GA. No leaking or bleeding. Feeling some flutters. She will return in 2 wks (already scheduled I did advise 4 is typical) and had 20 wk scan scheduled.

## 2024-12-19 ENCOUNTER — HOSPITAL ENCOUNTER (OUTPATIENT)
Facility: HOSPITAL | Age: 34
Discharge: HOME | End: 2024-12-19
Attending: STUDENT IN AN ORGANIZED HEALTH CARE EDUCATION/TRAINING PROGRAM | Admitting: STUDENT IN AN ORGANIZED HEALTH CARE EDUCATION/TRAINING PROGRAM
Payer: COMMERCIAL

## 2024-12-19 VITALS
WEIGHT: 260 LBS | TEMPERATURE: 98.1 F | HEIGHT: 65 IN | HEART RATE: 84 BPM | BODY MASS INDEX: 43.32 KG/M2 | DIASTOLIC BLOOD PRESSURE: 72 MMHG | OXYGEN SATURATION: 97 % | SYSTOLIC BLOOD PRESSURE: 127 MMHG | RESPIRATION RATE: 18 BRPM

## 2024-12-19 PROBLEM — O26.899 PELVIC PAIN DURING PREGNANCY: Status: ACTIVE | Noted: 2024-12-19

## 2024-12-19 PROBLEM — R10.2 PELVIC PAIN DURING PREGNANCY: Status: ACTIVE | Noted: 2024-12-19

## 2024-12-19 PROCEDURE — 76817 TRANSVAGINAL US OBSTETRIC: CPT | Performed by: STUDENT IN AN ORGANIZED HEALTH CARE EDUCATION/TRAINING PROGRAM

## 2024-12-19 PROCEDURE — 99214 OFFICE O/P EST MOD 30 MIN: CPT

## 2024-12-19 PROCEDURE — 76817 TRANSVAGINAL US OBSTETRIC: CPT

## 2024-12-19 PROCEDURE — NC001 PR NO CHARGE: Performed by: STUDENT IN AN ORGANIZED HEALTH CARE EDUCATION/TRAINING PROGRAM

## 2024-12-19 RX ORDER — SODIUM CHLORIDE FOR INHALATION 0.9 %
VIAL, NEBULIZER (ML) INHALATION
Status: DISCONTINUED
Start: 2024-12-19 | End: 2024-12-20 | Stop reason: HOSPADM

## 2024-12-20 NOTE — PROCEDURES
Araceli Levy, a  at 18w6d with an AKILAH of 2025, by Ultrasound, was seen at [facility] AND DELIVERY for the following procedure(s): $Procedure Type: US - Transvaginal]    Ultrasound Other  Fetal Presentation: Breech  Cervical Length: 4.18  Placenta Previa: No  Vasa Previa: No         Media Information      Document Information    Clinical Image - Mobile Device   CL   2024 02:37   Attached To:   Araceli Levy   Source Information    Lev Harvey MD  An L&D   Document History        Lev Harvey MD  Obstetrics & Gynecology PGY-1  2024  2:38 AM

## 2024-12-20 NOTE — PROGRESS NOTES
L&D Triage Note - OB/GYN  Araceli Levy 34 y.o. female MRN: [id number]  Unit/Bed#:  TRIAGE  Encounter: [id number]      ASSESSMENT:    Araceli Levy is a 34 y.o.  at 18w5d who was evaluated today in triage due to cramping. KOH/WTMT WNL; cervical length exam WNL; SVE showed closed cervix. Reassuring work up, the patient does not appear to be in  labor and it is safe to discharge home.     PLAN:    1) Cramping  - Speculum exam: no abnormal discharge, no pooling, no bleeding  - Cervical length via TVUS: WNL  - SVE: closed cervix     2) 18+5 weeks gestation of pregnancy  - Continue routine prenatal care  - Discharge from OB triage with  labor precautions    - Reviewed rupture of membranes, false vs true labor, decreased fetal movement, and vaginal bleeding   - Pt to call provider with any concerns and follow up at her next scheduled prenatal appointment    - Case discussed with Dr. Nolasco    SUBJECTIVE:    Araceli Levy 34 y.o.  at 18w5d with an Estimated Date of Delivery: 25 presenting with abdominal pain.  Feels 6-7/10 sharp, pelvic pain left greater than right. Denies dysuria, vaginal discharge, vaginal itching.    Her past obstetrical history is significant for 1 prior miscarriage at 8 weeks in . This pregnancy has been unremarkable    Contractions: Denies  Leakage of fluid: Denies  Vaginal Bleeding: Denies current bleeding today, though had minimal dark red/light pink spotting yesterday.  Fetal movement: present, though decreased.      OBJECTIVE:    Vitals:    24   BP: 127/72   Pulse: 84   Resp:    Temp:    SpO2: 97%       ROS:  Constitutional: Negative  Respiratory: Negative  Cardiovascular: Negative    Gastrointestinal: Negative    General Physical Exam:  General: Well appearing, no distress  Respiratory: Unlabored breathing  Cardiovascular: Regular rate.  Abdomen: Soft, gravid, nontender  Fundal Height: Appropriate for gestational age.  Extremities:  Warm and well perfused.  Non tender.      Fetal monitoring:  Fetal heart rate:    [place]: Contraction Frequency (minutes): 0  Contraction Duration (seconds): N/A      Cervical Exam  Speculum: Cervical os is closed. No abnormal discharge, no bleeding, no lesions, no pooling    KOH/WTMT:     Infection:   - no clue cells    - no hyphae   - no trichomonads present    Membrane status   - no ferning   - negative nitrazene   - no pooling     SVE: closed cervix, exam performed by Dr. Lev Coyle MD  12/19/2024  11:27 PM

## 2024-12-26 ENCOUNTER — TELEPHONE (OUTPATIENT)
Age: 34
End: 2024-12-26

## 2024-12-26 NOTE — TELEPHONE ENCOUNTER
Reached out to pt as 2nd attempt for routine referral and placing on proper wait list. LVM for pt to contact intake dept.     Referral closed.

## 2024-12-29 NOTE — PROGRESS NOTES
Please refer to the [facility] ultrasound report in Ob Procedures for additional information regarding today's visit

## 2025-01-02 ENCOUNTER — ROUTINE PRENATAL (OUTPATIENT)
Dept: PERINATAL CARE | Facility: OTHER | Age: 35
End: 2025-01-02
Payer: COMMERCIAL

## 2025-01-02 ENCOUNTER — ROUTINE PRENATAL (OUTPATIENT)
Dept: OBGYN CLINIC | Facility: MEDICAL CENTER | Age: 35
End: 2025-01-02
Payer: COMMERCIAL

## 2025-01-02 VITALS
HEART RATE: 104 BPM | SYSTOLIC BLOOD PRESSURE: 126 MMHG | DIASTOLIC BLOOD PRESSURE: 86 MMHG | WEIGHT: 267.2 LBS | BODY MASS INDEX: 44.46 KG/M2

## 2025-01-02 VITALS
WEIGHT: 266.8 LBS | SYSTOLIC BLOOD PRESSURE: 132 MMHG | BODY MASS INDEX: 44.45 KG/M2 | DIASTOLIC BLOOD PRESSURE: 78 MMHG | HEART RATE: 96 BPM | HEIGHT: 65 IN

## 2025-01-02 DIAGNOSIS — Z3A.20 20 WEEKS GESTATION OF PREGNANCY: ICD-10-CM

## 2025-01-02 DIAGNOSIS — O99.212 MATERNAL MORBID OBESITY IN SECOND TRIMESTER, ANTEPARTUM (HCC): Primary | ICD-10-CM

## 2025-01-02 DIAGNOSIS — O99.810 ABNORMAL GLUCOSE TOLERANCE AFFECTING PREGNANCY, ANTEPARTUM: ICD-10-CM

## 2025-01-02 DIAGNOSIS — R81 GLUCOSURIA: ICD-10-CM

## 2025-01-02 DIAGNOSIS — Z34.82 PRENATAL CARE, SUBSEQUENT PREGNANCY, SECOND TRIMESTER: Primary | ICD-10-CM

## 2025-01-02 DIAGNOSIS — E66.01 MATERNAL MORBID OBESITY IN SECOND TRIMESTER, ANTEPARTUM (HCC): Primary | ICD-10-CM

## 2025-01-02 DIAGNOSIS — O99.210 OBESITY IN PREGNANCY: ICD-10-CM

## 2025-01-02 DIAGNOSIS — G43.019 REFRACTORY MIGRAINE WITHOUT AURA: ICD-10-CM

## 2025-01-02 DIAGNOSIS — E66.813 CLASS 3 OBESITY: ICD-10-CM

## 2025-01-02 LAB
SL AMB  POCT GLUCOSE, UA: ABNORMAL
SL AMB POCT URINE PROTEIN: NEGATIVE

## 2025-01-02 PROCEDURE — 99213 OFFICE O/P EST LOW 20 MIN: CPT | Performed by: PHYSICIAN ASSISTANT

## 2025-01-02 PROCEDURE — 81002 URINALYSIS NONAUTO W/O SCOPE: CPT | Performed by: PHYSICIAN ASSISTANT

## 2025-01-02 PROCEDURE — 76811 OB US DETAILED SNGL FETUS: CPT | Performed by: OBSTETRICS & GYNECOLOGY

## 2025-01-02 PROCEDURE — 99213 OFFICE O/P EST LOW 20 MIN: CPT | Performed by: OBSTETRICS & GYNECOLOGY

## 2025-01-02 NOTE — PROGRESS NOTES
Abnormal glucose tolerance affecting pregnancy, antepartum  Plans to complete 3 hour glucola tomorrow    20 weeks gestation of pregnancy  Araceli  is a 34 y.o.  @20w5d who presents for routine prenatal visit.    Denies OB complaints.     NIPT- low risk  MASFP- low risk  Level II - scheduled for later today    Good fetal movement.  Denies contractions, cramping, leakage of fluid or vaginal bleeding.     Reviewed PTL precautions and reasons to call.        Refractory migraine without aura  Migraines becoming more frequent in pregnancy  She will not take tylenol- prefers to avoid medications in pregnancy  Discussed option of magnesium/B2 supplements which she will try  She is normotensive today  Negative protein on dip  Provided rx for pre-e labs for pt reassurance     Obesity in pregnancy  Taking ASA  Level 2 scheduled    Glucosuria  +glucose on dip in office today  Had two yogurts this AM  Has 3 hour GTT scheduled

## 2025-01-02 NOTE — ASSESSMENT & PLAN NOTE
Migraines becoming more frequent in pregnancy  She will not take tylenol- prefers to avoid medications in pregnancy  Discussed option of magnesium/B2 supplements which she will try  She is normotensive today  Negative protein on dip  Provided rx for pre-e labs for pt reassurance

## 2025-01-02 NOTE — PROGRESS NOTES
Patient here for prenatal visit.   EPDS: 0  GA: 20w5d    Denies leaking of fluid, bleeding, cramping  Patient having slight sharp pains in pelvic area.  Normal Fetal Movement  Labs-Utd; AFP-negative  Level 2 scheduled today  Patient having very bad migraines since 24. She would like her protein checked.

## 2025-01-02 NOTE — LETTER
January 2, 2025     Saima Josue MD  [facility]  [address] [place] [id number]    Patient: Araceli Levy   YOB: 1990   Date of Visit: 1/2/2025       Dear Dr. Fantasma Josue:    Thank you for referring Araceli Levy to me for evaluation. Below are my notes for this consultation.    If you have questions, please do not hesitate to call me. I look forward to following your patient along with you.         Sincerely,        Rogers Hampton MD        CC: No Recipients    Rogers Hampton MD  1/2/2025  1:25 PM  Sign when Signing Visit  Please refer to the [facility] ultrasound report in Ob Procedures for additional information regarding today's visit

## 2025-01-02 NOTE — ASSESSMENT & PLAN NOTE
Araceli  is a 34 y.o.  @20w5d who presents for routine prenatal visit.    Denies OB complaints.     NIPT- low risk  MASFP- low risk  Level II - scheduled for later today    Good fetal movement.  Denies contractions, cramping, leakage of fluid or vaginal bleeding.     Reviewed PTL precautions and reasons to call.

## 2025-01-03 ENCOUNTER — RESULTS FOLLOW-UP (OUTPATIENT)
Dept: OBGYN CLINIC | Facility: CLINIC | Age: 35
End: 2025-01-03

## 2025-01-03 ENCOUNTER — APPOINTMENT (OUTPATIENT)
Dept: LAB | Facility: HOSPITAL | Age: 35
End: 2025-01-03
Attending: STUDENT IN AN ORGANIZED HEALTH CARE EDUCATION/TRAINING PROGRAM
Payer: COMMERCIAL

## 2025-01-03 DIAGNOSIS — Z34.82 PRENATAL CARE, SUBSEQUENT PREGNANCY, SECOND TRIMESTER: ICD-10-CM

## 2025-01-03 DIAGNOSIS — O99.810 ABNORMAL GLUCOSE AFFECTING PREGNANCY: ICD-10-CM

## 2025-01-03 LAB
ALBUMIN SERPL BCG-MCNC: 3.5 G/DL (ref 3.5–5)
ALP SERPL-CCNC: 46 U/L (ref 34–104)
ALT SERPL W P-5'-P-CCNC: 17 U/L (ref 7–52)
ANION GAP SERPL CALCULATED.3IONS-SCNC: 7 MMOL/L (ref 4–13)
AST SERPL W P-5'-P-CCNC: 15 U/L (ref 13–39)
BASOPHILS # BLD AUTO: 0.08 THOUSANDS/ΜL (ref 0–0.1)
BASOPHILS NFR BLD AUTO: 1 % (ref 0–1)
BILIRUB SERPL-MCNC: 0.23 MG/DL (ref 0.2–1)
BUN SERPL-MCNC: 11 MG/DL (ref 5–25)
CALCIUM SERPL-MCNC: 9.1 MG/DL (ref 8.4–10.2)
CHLORIDE SERPL-SCNC: 103 MMOL/L (ref 96–108)
CO2 SERPL-SCNC: 25 MMOL/L (ref 21–32)
CREAT SERPL-MCNC: 0.46 MG/DL (ref 0.6–1.3)
CREAT UR-MCNC: 88 MG/DL
EOSINOPHIL # BLD AUTO: 0.55 THOUSAND/ΜL (ref 0–0.61)
EOSINOPHIL NFR BLD AUTO: 4 % (ref 0–6)
ERYTHROCYTE [DISTWIDTH] IN BLOOD BY AUTOMATED COUNT: 13.3 % (ref 11.6–15.1)
GFR SERPL CREATININE-BSD FRML MDRD: 131 ML/MIN/1.73SQ M
GLUCOSE 1H P 100 G GLC PO SERPL-MCNC: 202 MG/DL (ref 65–179)
GLUCOSE 2H P 100 G GLC PO SERPL-MCNC: 189 MG/DL (ref 65–154)
GLUCOSE 3H P 100 G GLC PO SERPL-MCNC: 158 MG/DL (ref 65–139)
GLUCOSE P FAST SERPL-MCNC: 89 MG/DL (ref 65–99)
GLUCOSE P FAST SERPL-MCNC: 90 MG/DL (ref 65–94)
HCT VFR BLD AUTO: 41.4 % (ref 34.8–46.1)
HGB BLD-MCNC: 13.1 G/DL (ref 11.5–15.4)
IMM GRANULOCYTES # BLD AUTO: 0.21 THOUSAND/UL (ref 0–0.2)
IMM GRANULOCYTES NFR BLD AUTO: 2 % (ref 0–2)
LYMPHOCYTES # BLD AUTO: 2.55 THOUSANDS/ΜL (ref 0.6–4.47)
LYMPHOCYTES NFR BLD AUTO: 20 % (ref 14–44)
MCH RBC QN AUTO: 26.4 PG (ref 26.8–34.3)
MCHC RBC AUTO-ENTMCNC: 31.6 G/DL (ref 31.4–37.4)
MCV RBC AUTO: 84 FL (ref 82–98)
MONOCYTES # BLD AUTO: 0.73 THOUSAND/ΜL (ref 0.17–1.22)
MONOCYTES NFR BLD AUTO: 6 % (ref 4–12)
NEUTROPHILS # BLD AUTO: 8.74 THOUSANDS/ΜL (ref 1.85–7.62)
NEUTS SEG NFR BLD AUTO: 67 % (ref 43–75)
NRBC BLD AUTO-RTO: 0 /100 WBCS
PLATELET # BLD AUTO: 297 THOUSANDS/UL (ref 149–390)
PMV BLD AUTO: 8.6 FL (ref 8.9–12.7)
POTASSIUM SERPL-SCNC: 3.6 MMOL/L (ref 3.5–5.3)
PROT SERPL-MCNC: 7 G/DL (ref 6.4–8.4)
PROT UR-MCNC: 6.9 MG/DL
PROT/CREAT UR: 0.1 MG/G{CREAT} (ref 0–0.1)
RBC # BLD AUTO: 4.96 MILLION/UL (ref 3.81–5.12)
SODIUM SERPL-SCNC: 135 MMOL/L (ref 135–147)
WBC # BLD AUTO: 12.86 THOUSAND/UL (ref 4.31–10.16)

## 2025-01-03 PROCEDURE — 80053 COMPREHEN METABOLIC PANEL: CPT

## 2025-01-03 PROCEDURE — 82570 ASSAY OF URINE CREATININE: CPT

## 2025-01-03 PROCEDURE — 36415 COLL VENOUS BLD VENIPUNCTURE: CPT

## 2025-01-03 PROCEDURE — 85025 COMPLETE CBC W/AUTO DIFF WBC: CPT

## 2025-01-03 PROCEDURE — 82952 GTT-ADDED SAMPLES: CPT

## 2025-01-03 PROCEDURE — 84156 ASSAY OF PROTEIN URINE: CPT

## 2025-01-03 PROCEDURE — 82951 GLUCOSE TOLERANCE TEST (GTT): CPT

## 2025-01-06 DIAGNOSIS — O24.410 DIET CONTROLLED GESTATIONAL DIABETES MELLITUS (GDM) IN SECOND TRIMESTER: Primary | ICD-10-CM

## 2025-01-09 ENCOUNTER — HOSPITAL ENCOUNTER (OUTPATIENT)
Facility: HOSPITAL | Age: 35
Discharge: HOME | End: 2025-01-09
Attending: STUDENT IN AN ORGANIZED HEALTH CARE EDUCATION/TRAINING PROGRAM | Admitting: STUDENT IN AN ORGANIZED HEALTH CARE EDUCATION/TRAINING PROGRAM
Payer: COMMERCIAL

## 2025-01-09 VITALS
HEART RATE: 104 BPM | HEIGHT: 65 IN | BODY MASS INDEX: 44.32 KG/M2 | WEIGHT: 266 LBS | TEMPERATURE: 98.2 F | RESPIRATION RATE: 18 BRPM | DIASTOLIC BLOOD PRESSURE: 72 MMHG | SYSTOLIC BLOOD PRESSURE: 128 MMHG | OXYGEN SATURATION: 96 %

## 2025-01-09 PROBLEM — O36.8190 DECREASED FETAL MOVEMENT: Status: ACTIVE | Noted: 2025-01-09

## 2025-01-09 PROCEDURE — NC001 PR NO CHARGE: Performed by: STUDENT IN AN ORGANIZED HEALTH CARE EDUCATION/TRAINING PROGRAM

## 2025-01-09 PROCEDURE — 99214 OFFICE O/P EST MOD 30 MIN: CPT

## 2025-01-09 PROCEDURE — 76815 OB US LIMITED FETUS(S): CPT

## 2025-01-10 ENCOUNTER — TELEPHONE (OUTPATIENT)
Age: 35
End: 2025-01-10

## 2025-01-10 NOTE — PROGRESS NOTES
L&D Triage Note - OB/GYN  Araceli Levy 34 y.o. female MRN: [id number]  Unit/Bed#:  TRIAGE  Encounter: [id number]      ASSESSMENT:    Araceli Levy is a 34 y.o.  at 21w5d who was evaluated today for decreased fetal movement. NST is reactive and LVP within normal limits with fetal movement seen on ultrasound. Patient feeling fetal movement in triage. Safe for discharge to home with return precautions reviewed.    PLAN:    1) Decreased Fetal Movement  - LVP: 4.48 cm  - NST reactive   - Patient feeling movement in triage  - Fetal movement visualized on ultrasound      2) 21 weeks gestation of pregnancy  - Continue routine prenatal care  - Discharge from OB triage with  labor precautions    - Reviewed rupture of membranes, false vs true labor, decreased fetal movement, and vaginal bleeding   - Pt to call provider with any concerns and follow up at her next scheduled prenatal appointment    - Case discussed with Dr. Deluca    SUBJECTIVE:    Araceli Levy 34 y.o.  at 21w5d with an Estimated Date of Delivery: 25 presenting with decreased fetal movement. She reports not feeling any fetal movement over the last days, when her baby has been active over the last two weeks.     Her past obstetrical history is significant for 1 SAB.  Her current pregnancy has been complicated by obesity.     Contractions: Denies  Leakage of fluid: Denies  Vaginal Bleeding: Denies  Fetal movement: present    OBJECTIVE:    Vitals:    25 2222   BP: 128/72   Pulse: 104   Resp: 18   Temp: 98.2 °F (36.8 °C)   SpO2: 96%       ROS:  Constitutional: Negative  Respiratory: Negative  Cardiovascular: Negative   Gastrointestinal: Negative    General Physical Exam:  General: Well appearing, no distress  Respiratory: Unlabored breathing  Cardiovascular: Regular rate.  Abdomen: Soft, gravid, nontender  Fundal Height: Appropriate for gestational age.  Extremities: Warm and well perfused.  Non  tender.    Fetal monitoring:  Fetal heart rate: 148 bpm via Doppler's, and 179 bpm via M-mode       Imaging:        Abd. US   LVP: 4.48cm         Lev Harvey MD  1/9/2025  11:46 PM

## 2025-01-10 NOTE — PROCEDURES
Araceli Levy, a  at 21w5d with an AKILAH of 2025, by Ultrasound, was seen at [facility] LABOR AND DELIVERY for the following procedure(s): $Procedure Type: ZACHARY]      4 Quadrant ZACHARY  LVP (cm): 4.5 cm        Ultrasound Other  Placenta Location: Posterior       Media Information      Document Information    Clinical Image - Mobile Device   LVP and HR   2025 23:50   Attached To:   Araceli Levy   Source Information    Lev Harvey MD  An L&D   Document History        Lev Harvey MD  25  11:49 PM

## 2025-01-14 ENCOUNTER — HOSPITAL ENCOUNTER (OUTPATIENT)
Facility: HOSPITAL | Age: 35
Discharge: HOME | End: 2025-01-14
Attending: OBSTETRICS & GYNECOLOGY | Admitting: OBSTETRICS & GYNECOLOGY
Payer: COMMERCIAL

## 2025-01-14 VITALS
HEART RATE: 99 BPM | RESPIRATION RATE: 20 BRPM | TEMPERATURE: 98.3 F | SYSTOLIC BLOOD PRESSURE: 123 MMHG | OXYGEN SATURATION: 98 % | DIASTOLIC BLOOD PRESSURE: 75 MMHG

## 2025-01-14 PROBLEM — Z3A.22 22 WEEKS GESTATION OF PREGNANCY: Status: ACTIVE | Noted: 2024-11-11

## 2025-01-14 PROBLEM — N94.9 ROUND LIGAMENT PAIN: Status: ACTIVE | Noted: 2025-01-14

## 2025-01-14 PROCEDURE — NC001 PR NO CHARGE: Performed by: OBSTETRICS & GYNECOLOGY

## 2025-01-14 PROCEDURE — 99213 OFFICE O/P EST LOW 20 MIN: CPT

## 2025-01-15 NOTE — PROGRESS NOTES
L&D Triage Note - OB/GYN  Araceli Levy 34 y.o. female MRN: [id number]  Unit/Bed#:  TRIAGE  Encounter: [id number]    ASSESSMENT  Araceli Levy is a 34 y.o.  at 22w3d presenting with abdominal pain.  labor workup negative Etiology likely round ligament pain. Encouraged patient to use belly band or tape to hold gravid uterus. She is safe to be discharged home with return precautions.     PLAN  #1. R/o PTL:   Cervix visually closed on speculum exam, no active bleeding or abnormal discharge  Microscopy negative  Cervical length >2.5cm  SVE C/T/H  FHR: 140s  [place]: no ctx    #2. Abdominal pain:   UA neg  Likely round ligament  Encouraged tape and belly band  Use heating pad     Discharge instructions  Patient instructed to call if experiencing worsening contractions, vaginal bleeding, loss of fluid or decreased fetal movement.  Will follow up with OBGYN on 2025.    D/w Dr. Raphael  ______________    SUBJECTIVE    AKILAH: Estimated Date of Delivery: 25    HPI:  34 y.o.  22w3d presents with complaint of lower abdominal pain since two days ago. It is worse on her left side. She reports it is sharp and constant. It is positional and relieved by lying down. She had intercourse  the day before and it was exacerbated. She denies nausea, vomiting, fevers, chills, urinary frequency/urgency, dysuria, flank pain.     Contractions: neg  Leakage of fluid: neg  Vaginal Bleeding: neg  Fetal movement: present    Her obstetrical history is unremarkable    ROS:  Constitutional: Negative  Respiratory: Negative  Cardiovascular: Negative    Gastrointestinal: Negative    OBJECTIVE:    Vitals:  /75 (BP Location: Left arm)   Pulse 99   Temp 98.3 °F (36.8 °C) (Oral)   Resp 20   LMP 2024 (Exact Date)   SpO2 98%   There is no height or weight on file to calculate BMI.    Physical Exam  Vitals and nursing note reviewed.   Constitutional:       General: She is not in acute distress.      Appearance: She is well-developed.   HENT:      Head: Normocephalic and atraumatic.   Eyes:      Conjunctiva/sclera: Conjunctivae normal.   Cardiovascular:      Rate and Rhythm: Normal rate and regular rhythm.      Heart sounds: No murmur heard.  Pulmonary:      Effort: Pulmonary effort is normal. No respiratory distress.      Breath sounds: Normal breath sounds.   Abdominal:      Palpations: Abdomen is soft.      Tenderness: There is no abdominal tenderness.   Musculoskeletal:         General: No swelling.      Cervical back: Neck supple.   Skin:     General: Skin is warm and dry.      Capillary Refill: Capillary refill takes less than 2 seconds.   Neurological:      Mental Status: She is alert.   Psychiatric:         Mood and Affect: Mood normal.         Speculum exam:  Normal external female genitalia  Cervix fully visualized and not visibly dilated  Physiologic discharge  No bleeding, pooling, abnormal discharge, or lesions noted      Microscopy:     Infection:   - neg clue cells    - neg hyphae   - neg trichomonads present    Membrane status   - neg ferning   - neg nitrazene   - neg pooling     SVE:   C/T/H    FHT:   140s    TOCO:    No ctx    IMAGING:       TVUS   Cervical length         - >2.5 cm    Labs: No results found for this or any previous visit (from the past 24 hours).        Jinny Pruitt MD  1/14/2025  8:54 PM

## 2025-01-21 ENCOUNTER — TELEPHONE (OUTPATIENT)
Age: 35
End: 2025-01-21

## 2025-01-21 NOTE — TELEPHONE ENCOUNTER
Patient was called and no answer on both numbers listed. Unable to leave a message.  Call is regarding her MFM referral for abnormal 3 hr GTT. Patient was also sent a message.

## 2025-01-22 NOTE — PATIENT INSTRUCTIONS
-Check A1c.   -CMP within normal.   -A1c goal is 5.6% or less.  -Follow up with dietitian.  -Keep 3 day food log to review with dietitian.  -Start self monitoring blood glucose (SMBG) fasting; 2 hours after start of each meal and with hypoglycemia.   -Glucose goals: fasting 60-95 mg/dL, 140 mg/dL or less 1 hour post meals, and 120 mg/dL or less 2 hours post meal.   -Report glucose readings weekly via [employer] every Thursday.  -Start GDM meal plan with 3 meals and 3 snacks including recommended combination of carb, protein and fat per meal/snack.  -Please eat meal or snack every 2-3.5 hours while awake.  -No more than 8 to 10 hours of fasting overnight.  -Refer to [employer] MyPlate online as a reference.  -2nd/3rd trimester minimum total daily carbohydrates 175 grams paired with half grams in protein.   -Stay active if no restriction from your OB, walk up to 30 minutes a day.  -Always have glucose available to treat hypoglycemia. Use 15:15 rule.   -Refer to hypoglycemia patient education sheet. SMBG when experiencing signs and symptoms of hypoglycemia and prior to driving.   -Serial fetal growth ultrasounds.  -20 weeks detailed fetal growth ultrasound.  -22-24 weeks fetal echo.  -If diabetes related medications are started, at 32 weeks gestation; NST twice a week and ZACHARY weekly.   -Continue prenatal vitamin as recommended.  -At 36 weeks gestation, stop baby aspirin.   -Continue follow-up with your OB and MFM as recommended.  -Stay in close contact with diabetes education team.  -Insulin requirements during pregnancy; basal/bolus concept and Metformin discussed.  -Very important to maintain tight glucose control during pregnancy to decrease risk factors including fetal macrosomia; birth injury; risk of ; polyhydramnios; pre-term labor; pre-eclampsia;  hypoglycemia; jaundice and stillbirth.   -Diabetes and pregnancy booklet; meal plan and hypoglycemia patient education.    Thank you for choosing us  for your  care today.  If you have any questions about your ultrasound or care, please do not hesitate to contact us or your primary obstetrician.        Some general instructions for your pregnancy are:    Exercise: Aim for 150 minutes per week of regular exercise.  Walking is great!  Nutrition: Choose healthy sources of calcium, iron, and protein.  Avoid ultraprocessed foods and added sugar.  Learn about Preeclampsia: preeclampsia is a common, potentially serious high blood pressure complication in pregnancy.  A blood pressure of 140mmHg (systolic or top number) or 90mmHg (diastolic or bottom number) should be evaluated by your doctor.  Aspirin is sometimes prescribed in early pregnancy to prevent preeclampsia in women with risk factors - ask your obstetrician if you should be on this medication.  For more resources, visit:  https://[email or web address].org/preeclampsia  If you smoke, please try to quit completely but also try to reduce your smoking by as much as possible (as soon as possible).  Do not vape.  Please also avoid cannabis products.  Other warning signs to watch out for in pregnancy or postpartum: chest pain, obstructed breathing or shortness of breath, seizures, thoughts of hurting yourself or your baby, bleeding, a painful or swollen leg, fever, or headache (see AWHONN POST-BIRTH Warning Signs campaign).  If these happen call 911.  Itching is also not normal in pregnancy and if you experience this, especially over your hands and feet, potentially worse at night, notify your doctors.

## 2025-01-23 ENCOUNTER — TELEMEDICINE (OUTPATIENT)
Facility: HOSPITAL | Age: 35
End: 2025-01-23
Attending: STUDENT IN AN ORGANIZED HEALTH CARE EDUCATION/TRAINING PROGRAM
Payer: COMMERCIAL

## 2025-01-23 DIAGNOSIS — Z3A.23 23 WEEKS GESTATION OF PREGNANCY: ICD-10-CM

## 2025-01-23 DIAGNOSIS — E66.01 CLASS 3 SEVERE OBESITY DUE TO EXCESS CALORIES WITH BODY MASS INDEX (BMI) OF 40.0 TO 44.9 IN ADULT, UNSPECIFIED WHETHER SERIOUS COMORBIDITY PRESENT (HCC): ICD-10-CM

## 2025-01-23 DIAGNOSIS — E66.813 CLASS 3 SEVERE OBESITY DUE TO EXCESS CALORIES WITH BODY MASS INDEX (BMI) OF 40.0 TO 44.9 IN ADULT, UNSPECIFIED WHETHER SERIOUS COMORBIDITY PRESENT (HCC): ICD-10-CM

## 2025-01-23 DIAGNOSIS — O24.410 DIET CONTROLLED GESTATIONAL DIABETES MELLITUS (GDM) IN SECOND TRIMESTER: Primary | ICD-10-CM

## 2025-01-23 PROCEDURE — 99215 OFFICE O/P EST HI 40 MIN: CPT | Performed by: NURSE PRACTITIONER

## 2025-01-23 PROCEDURE — 99417 PROLNG OP E/M EACH 15 MIN: CPT | Performed by: NURSE PRACTITIONER

## 2025-01-23 RX ORDER — BLOOD-GLUCOSE METER
EACH MISCELLANEOUS
Qty: 1 KIT | Refills: 0 | Status: SHIPPED | OUTPATIENT
Start: 2025-01-23

## 2025-01-23 RX ORDER — LANCETS
EACH MISCELLANEOUS
Qty: 100 EACH | Refills: 5 | Status: SHIPPED | OUTPATIENT
Start: 2025-01-23

## 2025-01-23 NOTE — PROGRESS NOTES
Virtual Regular Visit  Name: Araceli Levy      : 1990      MRN: [id number]  Encounter Provider: STEW Whalen  Encounter Date: 2025   Encounter department: [facility]      Verification of patient location:  Patient is located at Home in the following state in which I hold an active license [place] :  Assessment & Plan  Diet controlled gestational diabetes mellitus (GDM) in second trimester  -Check A1c.   -CMP within normal.   -A1c goal is 5.6% or less.  -Follow up with dietitian.  -Keep 3 day food log to review with dietitian.  -Start self monitoring blood glucose (SMBG) fasting; 2 hours after start of each meal and with hypoglycemia.   -Glucose goals: fasting 60-95 mg/dL, 140 mg/dL or less 1 hour post meals, and 120 mg/dL or less 2 hours post meal.   -Report glucose readings weekly via [employer] every Thursday.  -Start GDM meal plan with 3 meals and 3 snacks including recommended combination of carb, protein and fat per meal/snack.  -Please eat meal or snack every 2-3.5 hours while awake.  -No more than 8 to 10 hours of fasting overnight.  -Refer to [employer] MyPlate online as a reference.  -2nd/3rd trimester minimum total daily carbohydrates 175 grams paired with half grams in protein.   -Stay active if no restriction from your OB, walk up to 30 minutes a day.  -Always have glucose available to treat hypoglycemia. Use 15:15 rule.   -Refer to hypoglycemia patient education sheet. SMBG when experiencing signs and symptoms of hypoglycemia and prior to driving.   -Serial fetal growth ultrasounds.  -20 weeks detailed fetal growth ultrasound.  -22-24 weeks fetal echo.  -If diabetes related medications are started, at 32 weeks gestation; NST twice a week and ZACHARY weekly.   -Continue prenatal vitamin as recommended.  -At 36 weeks gestation, stop baby aspirin.   -Continue follow-up with your OB and MFM as recommended.  -Stay in close contact with diabetes education  team.  -Insulin requirements during pregnancy; basal/bolus concept and Metformin discussed.  -Very important to maintain tight glucose control during pregnancy to decrease risk factors including fetal macrosomia; birth injury; risk of ; polyhydramnios; pre-term labor; pre-eclampsia;  hypoglycemia; jaundice and stillbirth.   -Diabetes and pregnancy booklet; meal plan and hypoglycemia patient education.       23 weeks gestation of pregnancy    Orders:    [employer] glucose flowsheet    Blood Glucose Monitoring Suppl (Contour Next EZ) w/Device KIT; Dispense 1 kit per insurance formulary. GDM.    Contour Next Test test strip; Test 4 times a day. GDM    Microlet Lancets MISC; Use 4 a day. GDM    Hemoglobin A1C; Future    Class 3 severe obesity due to excess calories with body mass index (BMI) of 40.0 to 44.9 in adult, unspecified whether serious comorbidity present (HCC)  -Pre-pregnancy weight 249 lbs. BMI 41.44.  -Current weight 266 lbs.   -TWG 17 lbs.  -Recommended weight gain 11 to 20 lbs.   -Start GDM meal plan and follow up with dietitian.   Orders:    [employer] glucose flowsheet    Blood Glucose Monitoring Suppl (Contour Next EZ) w/Device KIT; Dispense 1 kit per insurance formulary. GDM.    Contour Next Test test strip; Test 4 times a day. GDM    Microlet Lancets MISC; Use 4 a day. GDM    Hemoglobin A1C; Future             Encounter provider STEW Whalen    The patient was identified by name and date of birth. Araceli Levy was informed that this is a telemedicine visit and that the visit is being conducted through the Epic Embedded platform. She agrees to proceed..  My office door was closed. No one else was in the room.  She acknowledged consent and understanding of privacy and security of the video platform. The patient has agreed to participate and understands they can discontinue the visit at any time.    Patient is aware this is a billable service.     History of Present Illness   Araceli  "is a 35 yo  female, 23 5/7 weeks gestation who presents with GDM. Abnormal 3 hour GTT. No recent A1c noted. FMH-diabetes.   Has been on night shift 10:30 PM-6:30 AM for the past week. Wakes up at 11 AM-12 PM; then eats scrambled eggs with asparagus and broccoli; 4 to 5 PM-peanut butter with bananas on bread or chicken with a tortilla. Drinks water or milk. 10 PM-soup with chicken, cheese with rice. During night shift will drink only water. Does not exercise. Familiar with glucose meter testing. Weight gain up to date 17 lbs. BP within normal.   HPI  Review of Systems   Constitutional:  Negative for fatigue and fever.   HENT:  Negative for congestion.    Respiratory:  Negative for cough and shortness of breath.    Cardiovascular:  Negative for chest pain, palpitations and leg swelling.   Gastrointestinal:  Negative for constipation, nausea and vomiting.   Endocrine: Positive for polydipsia and polyuria. Negative for polyphagia.   Genitourinary:  Negative for vaginal bleeding.   Musculoskeletal:  Negative for back pain.   Skin:  Negative for rash.   Psychiatric/Behavioral:  Negative for sleep disturbance.        Objective   Ht 5' 5\" (1.651 m)   Wt 121 kg (266 lb)   LMP 2024 (Exact Date)   BMI 44.26 kg/m²     Physical Exam  HENT:      Head: Normocephalic.      Nose: Nose normal.   Eyes:      Conjunctiva/sclera: Conjunctivae normal.   Pulmonary:      Effort: Pulmonary effort is normal.   Neurological:      Mental Status: She is alert and oriented to person, place, and time.         Visit Time  Total Visit Duration: 50 minutes with patient and 10 minutes charting/reviewing chart.   "

## 2025-01-23 NOTE — ASSESSMENT & PLAN NOTE
Orders:    [employer] glucose flowsheet    Blood Glucose Monitoring Suppl (Contour Next EZ) w/Device KIT; Dispense 1 kit per insurance formulary. GDM.    Contour Next Test test strip; Test 4 times a day. GDM    Microlet Lancets MISC; Use 4 a day. GDM    Hemoglobin A1C; Future

## 2025-01-23 NOTE — ASSESSMENT & PLAN NOTE
-Check A1c.   -CMP within normal.   -A1c goal is 5.6% or less.  -Follow up with dietitian.  -Keep 3 day food log to review with dietitian.  -Start self monitoring blood glucose (SMBG) fasting; 2 hours after start of each meal and with hypoglycemia.   -Glucose goals: fasting 60-95 mg/dL, 140 mg/dL or less 1 hour post meals, and 120 mg/dL or less 2 hours post meal.   -Report glucose readings weekly via [employer] every Thursday.  -Start GDM meal plan with 3 meals and 3 snacks including recommended combination of carb, protein and fat per meal/snack.  -Please eat meal or snack every 2-3.5 hours while awake.  -No more than 8 to 10 hours of fasting overnight.  -Refer to [employer] MyPlate online as a reference.  -2nd/3rd trimester minimum total daily carbohydrates 175 grams paired with half grams in protein.   -Stay active if no restriction from your OB, walk up to 30 minutes a day.  -Always have glucose available to treat hypoglycemia. Use 15:15 rule.   -Refer to hypoglycemia patient education sheet. SMBG when experiencing signs and symptoms of hypoglycemia and prior to driving.   -Serial fetal growth ultrasounds.  -20 weeks detailed fetal growth ultrasound.  -22-24 weeks fetal echo.  -If diabetes related medications are started, at 32 weeks gestation; NST twice a week and ZACHARY weekly.   -Continue prenatal vitamin as recommended.  -At 36 weeks gestation, stop baby aspirin.   -Continue follow-up with your OB and MFM as recommended.  -Stay in close contact with diabetes education team.  -Insulin requirements during pregnancy; basal/bolus concept and Metformin discussed.  -Very important to maintain tight glucose control during pregnancy to decrease risk factors including fetal macrosomia; birth injury; risk of ; polyhydramnios; pre-term labor; pre-eclampsia;  hypoglycemia; jaundice and stillbirth.   -Diabetes and pregnancy booklet; meal plan and hypoglycemia patient education.

## 2025-01-23 NOTE — ASSESSMENT & PLAN NOTE
-Pre-pregnancy weight 249 lbs. BMI 41.44.  -Current weight 266 lbs.   -TWG 17 lbs.  -Recommended weight gain 11 to 20 lbs.   -Start GDM meal plan and follow up with dietitian.   Orders:    [facility] glucose flowsheet    Blood Glucose Monitoring Suppl (Contour Next EZ) w/Device KIT; Dispense 1 kit per insurance formulary. GDM.    Contour Next Test test strip; Test 4 times a day. GDM    Microlet Lancets MISC; Use 4 a day. GDM    Hemoglobin A1C; Future

## 2025-01-24 ENCOUNTER — HOSPITAL ENCOUNTER (OUTPATIENT)
Facility: HOSPITAL | Age: 35
Discharge: HOME | End: 2025-01-24
Attending: STUDENT IN AN ORGANIZED HEALTH CARE EDUCATION/TRAINING PROGRAM | Admitting: STUDENT IN AN ORGANIZED HEALTH CARE EDUCATION/TRAINING PROGRAM
Payer: COMMERCIAL

## 2025-01-24 ENCOUNTER — TELEPHONE (OUTPATIENT)
Age: 35
End: 2025-01-24

## 2025-01-24 VITALS
RESPIRATION RATE: 16 BRPM | TEMPERATURE: 98.2 F | WEIGHT: 266 LBS | SYSTOLIC BLOOD PRESSURE: 117 MMHG | BODY MASS INDEX: 44.32 KG/M2 | OXYGEN SATURATION: 98 % | DIASTOLIC BLOOD PRESSURE: 63 MMHG | HEART RATE: 101 BPM | HEIGHT: 65 IN

## 2025-01-24 VITALS — BODY MASS INDEX: 44.32 KG/M2 | WEIGHT: 266 LBS | HEIGHT: 65 IN

## 2025-01-24 PROBLEM — Z3A.24 24 WEEKS GESTATION OF PREGNANCY: Status: ACTIVE | Noted: 2025-01-24

## 2025-01-24 PROBLEM — E66.01 SEVERE OBESITY DUE TO EXCESS CALORIES AFFECTING PREGNANCY IN SECOND TRIMESTER (HCC): Status: ACTIVE | Noted: 2025-01-24

## 2025-01-24 PROBLEM — N89.8 VAGINAL DISCHARGE: Status: ACTIVE | Noted: 2025-01-24

## 2025-01-24 PROBLEM — O99.212 SEVERE OBESITY DUE TO EXCESS CALORIES AFFECTING PREGNANCY IN SECOND TRIMESTER (HCC): Status: ACTIVE | Noted: 2025-01-24

## 2025-01-24 PROCEDURE — 76815 OB US LIMITED FETUS(S): CPT

## 2025-01-24 PROCEDURE — 99214 OFFICE O/P EST MOD 30 MIN: CPT

## 2025-01-24 PROCEDURE — NC001 PR NO CHARGE: Performed by: STUDENT IN AN ORGANIZED HEALTH CARE EDUCATION/TRAINING PROGRAM

## 2025-01-24 NOTE — PROGRESS NOTES
"CLASS 2 - Individual  (virtual visit)    Thank you for referring your patient to [facility] Maternal Fetal Medicine Diabetes and Pregnancy Program.     Araceli Levy is a  34 y.o. female who presents today unaccompanied for Gestational Diabetes (24w3d), Virtual Regular Visit, and Patient Education (Class 2).  Patient is at 24w3d gestation, Estimated Date of Delivery: 5/17/25.     Visit Diagnosis:  Encounter Diagnosis     ICD-10-CM    1. Diet controlled gestational diabetes mellitus (GDM) in second trimester  O24.410       2. 24 weeks gestation of pregnancy  Z3A.24            Reviewed and updated the following from patients medical record: Allergies, and Current Medications. Reports episode of Acid Reflux/Indigestion yesterday.    Labs  GDM LABS: See Class 1 Note    A1C:  No results found for: \"HGBA1C\"     Labs Awaiting Collection: A1C    Current Medications:    Current Outpatient Medications:     aspirin (ECOTRIN LOW STRENGTH) 81 mg EC tablet, Take 2 tablets (162 mg total) by mouth daily Stop at 36 weeks.  Contact your OB or MFM with any side effects.  See [email or web address], Disp: 60 tablet, Rfl: 3    Prenatal Vit-Fe Fumarate-FA (PRENATAL PO), Take by mouth, Disp: , Rfl:     Blood Glucose Monitoring Suppl (Contour Next EZ) w/Device KIT, Dispense 1 kit per insurance formulary. GDM. (Patient not taking: Reported on 1/28/2025), Disp: 1 kit, Rfl: 0    Contour Next Test test strip, Test 4 times a day. GDM (Patient not taking: Reported on 1/28/2025), Disp: 100 each, Rfl: 5    Microlet Lancets MISC, Use 4 a day. GDM (Patient not taking: Reported on 1/28/2025), Disp: 100 each, Rfl: 5     Anthropometrics:  Ht Readings from Last 1 Encounters:   01/24/25 5' 5\" (1.651 m)      Wt Readings from Last 3 Encounters:   01/24/25 121 kg (266 lb)   01/23/25 121 kg (266 lb)   01/09/25 121 kg (266 lb)        Pre-Gravid Wt Pre-Gravid BMI TWG   113 kg (249 lb) 41.44 7.711 kg (17 lb)     Total Pregnancy Weight Gain " "Recommendations: BMI (> 30) 11-20 lbs  Current Wt Status Compared to Recommendations: *Women should gain 2-5lbs in first trimester; Rate of wt gain in 2nd and 3rd trimester depends on TWG recommendations based on pregravid BMI and height - Recommend patient maintain or gain up to an additional 3 lbs    Most Recent Ultrasound Results:  Findings: NML Growth/ZACHARY -25  Further Fetal Surveillance: None  Next US date: Scheduled Appropriately - 25    BLOOD GLUCOSE MONITORING:   Glucometer: Contour Next EZ - Patient has not picked up glucometer at this time due to issues at pharmacy (Noted prior authorization completed on 25; Educator called pharmacy after visit/Refer to telephone encounter)     Patient reports testing blood sugars using [employer]'s glucometer when he was visiting over the weekend (Discussed glucometer intended for single person use, reinforced target glucose levels during pregnancy differs vs non-pregnancy); Glucose levels not available at time of visit    Reinforced at Today's Visit:   Timing/Frequency of SMB x per day (Fasting, 2 hour after start of each meal)  Goals: (Fasting) 60-95mg/dL // (2hr PP) <120mg/dL  Reporting Guidelines: Weekly via Phone: [phone] OR My Chart (Message with image attachment) OR Glucose Flowsheet  Method of Reporting: [facility] Glucose Flowsheet    MEAL PLAN (Patient was provided with a meal plan including 3 meals and 3 snacks at class 1)  *Calories: 2000 calorie (CHO:45-15-60-15-60-30) (PRO: 2/3-1-3/4-1-3/4-2) - Patient states \"I don't really eat much during the day.\"     Typically works 5 times/week (Nightshift: 10:30PM-6:30AM); Goes to bed [id number] (Sleeps <8 hours)Off from work for 2 weeks currently   Day off: Bedtime: Midnight-1AM; Wakes up: 6-7am    Review of Patient's Current Diet: refer to class 1 note for additional details    Breakfast (9:30 am): 2 Scrambled eggs with broccoli/asparagus + Cheese  AM Snack: String Cheese   Lunch (4 pm): Scrambled " Eggs (x 4), 1.5 oz Rotisserie Chicken, Asparagus  PM Snack: String Cheese  Dinner: 2 Chicken Cutlets (Grilled/No Breading), Asparagus, Vidali Onion, 1 Avocado  Bedtime Snack: Apple + 2 Peanut Butter    Beverages: No Sugar Sweetened Beverages - Water (Drinks 6-8 bottles)  Dining Out Frequency: Rarely    Meal Plan Recommendations Compliant? Comments:    Consistent CHO Intake No  Carbohydrate intake appears inadequate at meals/snacks; Discussed minimum target of 175 grams carbohydrate daily; Provided list of snack suggestions, reviewed meal plan/food list (Will mail copy of GDM booklet and meal plan to patient)   3 Meals and 3 Snacks Yes     Protein w/ Every Meal and Snack Yes     Eating every 2-3.5hrs while awake  Yes     8-10hrs Fasting (from time of bedtime snack until first meal of the day) No   Appears to be exceeding fasting 8-10 hours when working night shift     Overall Impression: Pt has a Good  compliance and understanding of diet recommendations at this time.    Reinforced Diet Instructions:  Individualized meal plan.   Importance of consistent carbohydrate intake via 3 meals and 3 snacks per day   Importance of protein as it relates to blood glucose control.   Encouraged  patient to eat every 2.0-3.5 hours while awake  Encouraged patient to go no longer than 8-10 hours fasting overnight until first meal of the day.  Provided suggested meal/snack options to increase nutrition and maintain consistent meal and snack intakes.    Physical Activity:  Currently physically active? Yes, Walking daily - 30 minutes    Reviewed w/ Pt:   Benefits of physical activity to optimize blood glucose control, encouraged activity at patient is physically able.   Instructed pt to always consult a physician prior to starting an exercise program.   Recommend 20-30 minutes daily.    Additional Topics Reviewed:    Medications: (reviewed options available with pt)  Discussed if blood sugars are not within normal range with meal  "planning and exercise  Reviewed medication such as metformin and/or basal/bolus insulin may be needed for better glucose control  Maternal-Fetal Testing:   Ultrasounds: growth scans every 4 weeks.  NST: twice weekly starting at 32nd week GA  ZACHARY:  weekly starting at 32 weeks GA  Sick day Guidelines:   Advised that sickness will raise blood sugar   If blood sugar is > 160 mg/dL twice in one day call doctor  If on diabetes medications, continue as instructed   If unable to consume normal meal plan, instructed to remain well hydrated   Hypoglycemia & Treatment Guidelines:  Reviewed what hypoglycemia is, signs and symptoms, and how to treat via the 15:15 rule.  Post-Partum Guidelines:  Completion of 75 gm CHO 2 hr gtt at 6 weeks post-partum to check for Type 2 DM diagnosis  Breastfeeding Guidelines:  Continue GDM meal plan plus additional 350-500 calories daily  Examples of protein and carbohydrate snacks provided.  Stay hydrated by drinking 8-10 (8 oz.) fluids daily.  Dining Out & Travel Guidelines:  Patient advised to be prepared with extra diabetes supplies, medications, and snacks, as well as sticking to the same time schedule and portions eaten at home for meals and snacks.    Patient Goal: \"I will eat 3 meals and 3 snacks each day, including protein at each\"  Goal Assessment: On track    Diabetes Self Management Support Plan outside of ongoing care: Spouse/Family    Barriers to Learning/Change: No Barriers  Expected Compliance: good    Date to report blood sugars: Weekly   Follow up:  Return in about 30 days (around 2025) for Review Diet, Review Blood Sugars.     Begin Time: 8:29am  End Time: 9:29am    It was a pleasure working with them today. Please feel free to call ([phone]) with any questions or concerns.    Sasha Jacobson RD   Diabetes Educator  [facility] Maternal Fetal Medicine  Diabetes and Pregnancy Program      Virtual Regular Visit  Name: Araceli Levy      : 1990      MRN: " "[id number]  Encounter Provider: Sasha Jacobson RD  Encounter Date: 2025   Encounter department: [facility]      Verification of patient location:  Patient is located at Home in the following state in which I hold an active license [place] :  Assessment & Plan  24 weeks gestation of pregnancy         Diet controlled gestational diabetes mellitus (GDM) in second trimester    No results found for: \"HGBA1C\"             Encounter provider Sasha Jacobson RD    The patient was identified by name and date of birth. Araceli Levy was informed that this is a telemedicine visit and that the visit is being conducted through the Epic Embedded platform. She agrees to proceed..  My office door was closed. No one else was in the room.  She acknowledged consent and understanding of privacy and security of the video platform. The patient has agreed to participate and understands they can discontinue the visit at any time.    Patient is aware this is a billable service.     History of Present Illness     HPI  Review of Systems    Objective   LMP 2024 (Exact Date)     Physical Exam    Visit Time  Total Visit Duration: 60 minutes  "

## 2025-01-24 NOTE — TELEPHONE ENCOUNTER
PA for Contour Next Test strip SUBMITTED to [employer]    via    [x]CMM-KEY: [id number]  []Surescripts-Case ID #   []Availity-Auth ID # NDC #   []Faxed to plan   []Other website   []Phone call Case ID #     []PA sent as URGENT    All office notes, labs and other pertaining documents and studies sent. Clinical questions answered. Awaiting determination from insurance company.     Turnaround time for your insurance to make a decision on your Prior Authorization can take 7-21 business days.

## 2025-01-25 NOTE — PROGRESS NOTES
Progress Note - OB/GYN   Name: Araceli Levy 34 y.o. female I MRN: [id number]  Unit/Bed#: LD TRIAGE  I Date of Admission: 2025   Date of Service: 2025 I Hospital Day: 0   L&D Triage Note - OB/GYN  Araceli Levy 34 y.o. female MRN: [id number]  Unit/Bed#: LD TRIAGE 1- Encounter: [id number]    ASSESSMENT:    Araceli Levy is a 34 y.o.  at 23w6d presents for concerns for PROM    PLAN:    1) ***  ***)  ***) Discharge from OB triage with pre***term*** labor precautions    - Reviewed rupture of membranes, false vs true labor, decreased fetal movement, and vaginal bleeding   - Pt to call provider with any concerns and follow up at her next scheduled prenatal appointment ***   - Continue routine prenatal care   - Case discussed with  ***    SUBJECTIVE:    Araceli Levy 34 y.o.  at 23w6d with an Estimated Date of Delivery: 25 who presents with one episode of leakage of fluid. She denies contractions. She reports leakage of fluid. Denies vaginal bleeding. She reports good fetal movement.  States that prior to arrival, she had 1 episode of wetting her shorts after walking up the stairs at home.  States that episode was painless, fluid covered the front and back of her shorts, reportedly odorless and thin with no evidence of blood.  Denied any urinary or fecal incontinence.  No cough.  No right upper quadrant abdominal pain, suprapubic pain, hematuria, dysuria.  Episode lasted a couple of seconds, not currently leaking fluid.    OBJECTIVE:    Vitals:    25 2146   BP: 117/63   Pulse: 101   Resp: 16   Temp: 98.2 °F (36.8 °C)   SpO2: 98%       ROS:  Constitutional: Negative  Respiratory: Negative  Cardiovascular: Negative    Gastrointestinal: Negative    General Physical Exam:  General: {EXAM; GENERAL APPEARANCE:5021}  Cardiovascular: intact distals pulses*** {Mis exam cardio:29019}  Lungs: non-labored breathing***  Abdomen: {Exam; abdomen:5259}  Lower extremeties: {Desc;  tender/non:51500}, b/l Magdiel's sign negative***    Cervical Exam  Speculum: Cervical os is ***  SVE: {gen number 0-10:315758} / {Effacement :95057} / {SL IP OB Station:71673}    Fetal monitoring:  FHT:  *** bpm/ {FHR Variability:87308} / 15 x 15*** 10 x 10*** accelerations present, *** decelerations  Bensley: contractions ***     KOH/WTMT:     Infection:   - *** clue cells    - *** hyphae   - *** trichomonads present    Membrane status   - *** ferning   - *** nitrazine   - *** pooling     Urine Dip    - ***    Imaging:       TVUS   - Cervical length    - ***cm    - ***cm    - ***cm   - Presentation: cephalic***        Abd. US   ZACHARY      - Q1 ***cm     - Q2 ***cm     - Q3 ***cm     - Q4 ***cm     - Total: ***cm   Placenta: ***   Presentation: ***      Yiafn Ramos MD  1/24/2025 11:10 PM

## 2025-01-25 NOTE — PROGRESS NOTES
L&D Triage Note - OB/GYN  Araceli Levy 34 y.o. female MRN: [id number]  Unit/Bed#:  TRIAGE  Encounter: [id number]      ASSESSMENT:    Araceli Levy is a 34 y.o.  at 23w6d presenting with leakage of fluid. Rupture workup negative and ZACHARY wnl. Patient does not appear to be ruptured and it is safe for discharge to home with return precautions.      PLAN:    1) Leakage of fluid  - No pooling, no ferning, negative nitrazine  - ZACHARY wnl: 12.16 cm    2) 23 weeks gestation of pregnancy  - Continue routine prenatal care  - Discharge from OB triage with  labor precautions    - Reviewed rupture of membranes, false vs true labor, decreased fetal movement, and vaginal bleeding   - Pt to call provider with any concerns and follow up at her next scheduled prenatal appointment    - Case discussed with Dr. Fantasma Josue    SUBJECTIVE:    Araceli Levy 34 y.o.  at 23w6d with an Estimated Date of Delivery: 25 who presents to triage for leakage of fluid. She and her partner note that she had one episode today where her shorts and underwear were wet with clear thin fluid.  She denies continued leaking, vaginal itching, vaginal irritation, recent intercourse. She otherwise denies contractions, vaginal bleeding. Feels some fetal movement. Denies dysuria, hematuria.     Her current obstetrical history is significant for GDM.    Her past obstetrical history is significant for SAB.    OBJECTIVE:    Vitals:    25 2146   BP: 117/63   Pulse: 101   Resp: 16   Temp: 98.2 °F (36.8 °C)   SpO2: 98%       ROS:  Constitutional: Negative  Respiratory: Negative  Cardiovascular: Negative    Gastrointestinal: Negative    General Physical Exam:  General: Well appearing, no distress  Respiratory: Unlabored breathing  Cardiovascular: Regular rate.  Abdomen: Soft, gravid, nontender  Fundal Height: Appropriate for gestational age.  Extremities: Warm and well perfused.  Non tender.      Cervical Exam  Speculum:  Cervical os is closed, no pooling, no abnormal discharge, no bleeding    FETAL ASSESSMENT:  Fetal heart rate: Baseline Rate (FHR): 145 bpm  Variability: Moderate  Accelerations: 10 x 10 (<32 weeks)  Decelerations: None  FHR Category: Category II  [place]:      KOH/WTMT:     Infection:   - no clue cells    - no hyphae   - no trichomonads present    Membrane status   - no ferning   - negative nitrazine   - no pooling     Imaging:        Abd. US   ZACHARY      - Q1 1.17 cm     - Q2 3.45 cm     - Q3 2.25 cm     - Q4 5.29 cm     - Total: 12.16 cm                Mimi Reece MD  OBGYN, PGY-I  01/24/25  11:30 PM

## 2025-01-28 ENCOUNTER — TELEMEDICINE (OUTPATIENT)
Facility: HOSPITAL | Age: 35
End: 2025-01-28
Attending: STUDENT IN AN ORGANIZED HEALTH CARE EDUCATION/TRAINING PROGRAM
Payer: COMMERCIAL

## 2025-01-28 ENCOUNTER — TELEPHONE (OUTPATIENT)
Facility: HOSPITAL | Age: 35
End: 2025-01-28

## 2025-01-28 DIAGNOSIS — Z3A.24 24 WEEKS GESTATION OF PREGNANCY: ICD-10-CM

## 2025-01-28 DIAGNOSIS — O24.419 GESTATIONAL DIABETES MELLITUS (GDM) IN SECOND TRIMESTER, GESTATIONAL DIABETES METHOD OF CONTROL UNSPECIFIED: Primary | ICD-10-CM

## 2025-01-28 DIAGNOSIS — O24.410 DIET CONTROLLED GESTATIONAL DIABETES MELLITUS (GDM) IN SECOND TRIMESTER: Primary | ICD-10-CM

## 2025-01-28 PROCEDURE — G0108 DIAB MANAGE TRN  PER INDIV: HCPCS | Performed by: DIETITIAN, REGISTERED

## 2025-01-28 NOTE — TELEPHONE ENCOUNTER
Reason for Call: Gestational Diabetes (24w3d) and Prescription request (Contour Next Glucometer)        Outcome: Called pharmacy, no answer; left voicemail with instructions to call office at [phone] to discuss Contour Next glucometer and testing supplies prescription. Noted Prior Authorization completed and approved on 1/24/25 per [employer].    Sasha Jacobson, MPH, RDN, LDN, [facility]  Diabetes Educator   [facility]  Diabetes and Pregnancy Program

## 2025-01-31 ENCOUNTER — OFFICE VISIT (OUTPATIENT)
Dept: PERINATAL CARE | Facility: OTHER | Age: 35
End: 2025-01-31
Payer: COMMERCIAL

## 2025-01-31 VITALS
DIASTOLIC BLOOD PRESSURE: 80 MMHG | HEART RATE: 98 BPM | BODY MASS INDEX: 44.65 KG/M2 | HEIGHT: 65 IN | SYSTOLIC BLOOD PRESSURE: 124 MMHG | WEIGHT: 268 LBS

## 2025-01-31 DIAGNOSIS — O24.410 DIET CONTROLLED GESTATIONAL DIABETES MELLITUS (GDM) IN SECOND TRIMESTER: ICD-10-CM

## 2025-01-31 DIAGNOSIS — Z36.2 ENCOUNTER FOR FOLLOW-UP ULTRASOUND OF FETAL ANATOMY: Primary | ICD-10-CM

## 2025-01-31 DIAGNOSIS — O99.212 SEVERE OBESITY DUE TO EXCESS CALORIES AFFECTING PREGNANCY IN SECOND TRIMESTER (HCC): ICD-10-CM

## 2025-01-31 DIAGNOSIS — E66.01 SEVERE OBESITY DUE TO EXCESS CALORIES AFFECTING PREGNANCY IN SECOND TRIMESTER (HCC): ICD-10-CM

## 2025-01-31 DIAGNOSIS — Z3A.24 24 WEEKS GESTATION OF PREGNANCY: ICD-10-CM

## 2025-01-31 PROCEDURE — 76816 OB US FOLLOW-UP PER FETUS: CPT | Performed by: OBSTETRICS & GYNECOLOGY

## 2025-01-31 PROCEDURE — 99214 OFFICE O/P EST MOD 30 MIN: CPT | Performed by: OBSTETRICS & GYNECOLOGY

## 2025-02-02 NOTE — PROGRESS NOTES
"[facility]: Ms. Levy was seen today for followup missed anatomy ultrasound.  See ultrasound report under \"OB Procedures\" tab.   The time spent on this established patient on the encounter date included 5 minutes previsit service time reviewing records and precharting, 10 minutes face-to-face service time counseling regarding results and coordinating care, and  5 minutes charting, totalling 20 minutes.  Please don't hesitate to contact our office with any concerns or questions.  -Nathalia Marquez MD    "

## 2025-02-06 PROBLEM — Z3A.24 24 WEEKS GESTATION OF PREGNANCY: Status: ACTIVE | Noted: 2024-11-11

## 2025-02-06 PROBLEM — Z34.90 SUPERVISION OF NORMAL PREGNANCY: Status: ACTIVE | Noted: 2025-02-06

## 2025-02-18 ENCOUNTER — TELEPHONE (OUTPATIENT)
Age: 35
End: 2025-02-18

## 2025-02-18 NOTE — TELEPHONE ENCOUNTER
Patient called, with questions about her upcoming MFM appts. Discussed in detail with patient. No further questions.

## 2025-02-22 ENCOUNTER — HOSPITAL ENCOUNTER (OUTPATIENT)
Facility: HOSPITAL | Age: 35
Discharge: HOME | End: 2025-02-22
Attending: STUDENT IN AN ORGANIZED HEALTH CARE EDUCATION/TRAINING PROGRAM | Admitting: STUDENT IN AN ORGANIZED HEALTH CARE EDUCATION/TRAINING PROGRAM
Payer: COMMERCIAL

## 2025-02-22 VITALS
WEIGHT: 268 LBS | RESPIRATION RATE: 20 BRPM | BODY MASS INDEX: 44.65 KG/M2 | HEIGHT: 65 IN | SYSTOLIC BLOOD PRESSURE: 105 MMHG | HEART RATE: 83 BPM | DIASTOLIC BLOOD PRESSURE: 51 MMHG | OXYGEN SATURATION: 98 % | TEMPERATURE: 97.9 F

## 2025-02-22 PROBLEM — R03.0 ELEVATED BLOOD PRESSURE READING WITHOUT DIAGNOSIS OF HYPERTENSION: Status: ACTIVE | Noted: 2025-02-22

## 2025-02-22 PROBLEM — Z3A.28 28 WEEKS GESTATION OF PREGNANCY: Status: ACTIVE | Noted: 2024-11-11

## 2025-02-22 LAB
ALBUMIN SERPL BCG-MCNC: 3.5 G/DL (ref 3.5–5)
ALP SERPL-CCNC: 56 U/L (ref 34–104)
ALT SERPL W P-5'-P-CCNC: 15 U/L (ref 7–52)
ANION GAP SERPL CALCULATED.3IONS-SCNC: 11 MMOL/L (ref 4–13)
AST SERPL W P-5'-P-CCNC: 19 U/L (ref 13–39)
BILIRUB SERPL-MCNC: 0.16 MG/DL (ref 0.2–1)
BUN SERPL-MCNC: 17 MG/DL (ref 5–25)
CALCIUM SERPL-MCNC: 8.9 MG/DL (ref 8.4–10.2)
CHLORIDE SERPL-SCNC: 105 MMOL/L (ref 96–108)
CO2 SERPL-SCNC: 19 MMOL/L (ref 21–32)
CREAT SERPL-MCNC: 0.5 MG/DL (ref 0.6–1.3)
CREAT UR-MCNC: 127 MG/DL
ERYTHROCYTE [DISTWIDTH] IN BLOOD BY AUTOMATED COUNT: 13.6 % (ref 11.6–15.1)
GFR SERPL CREATININE-BSD FRML MDRD: 127 ML/MIN/1.73SQ M
GLUCOSE SERPL-MCNC: 109 MG/DL (ref 65–140)
HCT VFR BLD AUTO: 35.7 % (ref 34.8–46.1)
HGB BLD-MCNC: 11.5 G/DL (ref 11.5–15.4)
MCH RBC QN AUTO: 26.9 PG (ref 26.8–34.3)
MCHC RBC AUTO-ENTMCNC: 32.2 G/DL (ref 31.4–37.4)
MCV RBC AUTO: 84 FL (ref 82–98)
PLATELET # BLD AUTO: 249 THOUSANDS/UL (ref 149–390)
PMV BLD AUTO: 8.6 FL (ref 8.9–12.7)
POTASSIUM SERPL-SCNC: 3.7 MMOL/L (ref 3.5–5.3)
PROT SERPL-MCNC: 6.7 G/DL (ref 6.4–8.4)
PROT UR-MCNC: 11.6 MG/DL
PROT/CREAT UR: 0.1 MG/G{CREAT} (ref 0–0.1)
RBC # BLD AUTO: 4.27 MILLION/UL (ref 3.81–5.12)
SODIUM SERPL-SCNC: 135 MMOL/L (ref 135–147)
WBC # BLD AUTO: 12.45 THOUSAND/UL (ref 4.31–10.16)

## 2025-02-22 PROCEDURE — 82570 ASSAY OF URINE CREATININE: CPT

## 2025-02-22 PROCEDURE — 99214 OFFICE O/P EST MOD 30 MIN: CPT

## 2025-02-22 PROCEDURE — 80053 COMPREHEN METABOLIC PANEL: CPT

## 2025-02-22 PROCEDURE — 85027 COMPLETE CBC AUTOMATED: CPT

## 2025-02-22 PROCEDURE — 84156 ASSAY OF PROTEIN URINE: CPT

## 2025-02-22 PROCEDURE — 99214 OFFICE O/P EST MOD 30 MIN: CPT | Performed by: STUDENT IN AN ORGANIZED HEALTH CARE EDUCATION/TRAINING PROGRAM

## 2025-02-22 NOTE — PROGRESS NOTES
L&D Triage Note - OB/GYN  Araceli Levy 34 y.o. female MRN: [id number]  Unit/Bed#: LD TRIAGE 3 Encounter: [id number]    Patient is seen by BRETT CALDERON  Araceli Levy is a 34 y.o.  at 28w0d who presented for elevated blood pressure readings. She is stable with workup not suggestive of severe preeclampsia at this time. At this time she does not meet criteria for any hypertensive disorder of pregnancy.    * Elevated blood pressure reading without diagnosis of hypertension  Assessment & Plan  Patient presented after elevated BP readings at [facility] and in the ED. No abnormal BP readings in triage; CBC/CMP wnl, P:C 0.1. Patient with occipital headache, improving by time of discharge. She declined medication for her headache. We discussed that while her labs and BPs were normal in triage, persistent headache may be a sign of severe preeclampsia. She was encouraged to return if her headache worsens or does not improve. She expressed understanding  - continue to monitor BP at prenatal visits  - return precautions given    28 weeks gestation of pregnancy  Assessment & Plan  NST reactive, no contractions on toco. Patient has no obstetric complaints. Maternal and fetal status overall reassuring at this time  - continue routine prenatal care  - return precautions given      #Discharge instructions  - patient instructed to call/return if experiencing worsening contractions, vaginal bleeding, loss of fluid, decreased fetal movement, worsening headache, visual disturbance, or right upper quadrant pain  - next OB appointment: 25    Patient discussed with attending physician Dr. Nolasco  ______________    SUBJECTIVE  AKILAH: Estimated Date of Delivery: 25  HPI:  34 y.o.  @28w0d with history of migraines, BMI 44 presents with complaint of elevated blood pressure. This pregnancy is complicated by the above medical history and A1GDM.     Patient states that 2 days prior to the current presentation, she  "noticed a small hemorrhage in her right eye. She informed her OBGYN, who told her to undergo a blood pressure check to rule out hypertension. She presented to [facility] on the evening of 2/21/25, where her blood pressure reading was 140s/90s. She then presented to the ED, where BP was 162/75.    On evaluation in triage, she reports a mild, throbbing occipital headache x5days. This headache is different from her usual migraines. She has not tried any medications for the pain and does not want to try any medications. She denies visual disturbances or right upper quadrant pain.    She denies contractions, vaginal bleeding, leakage of fluid, or decreased fetal movement.    OBJECTIVE:  Vitals:  /51   Pulse 83   Temp 97.9 °F (36.6 °C) (Oral)   Resp 20   Ht 5' 5\" (1.651 m)   Wt 122 kg (268 lb)   LMP 08/01/2024 (Exact Date)   SpO2 98%   BMI 44.60 kg/m²   Body mass index is 44.6 kg/m².    Physical Exam  Vitals reviewed.   Constitutional:       General: She is not in acute distress.     Appearance: She is well-developed.   HENT:      Head: Normocephalic and atraumatic.   Cardiovascular:      Rate and Rhythm: Normal rate.   Pulmonary:      Effort: Pulmonary effort is normal. No respiratory distress.   Skin:     Findings: No rash (on exposed skin).   Neurological:      Mental Status: She is alert and oriented to person, place, and time.   Psychiatric:         Mood and Affect: Affect normal.         Speech: Speech normal.         Behavior: Behavior normal.       FHT:  Baseline Rate (FHR): 145 bpm  Variability: Moderate  Accelerations: 10 x 10 (<32 weeks), At variable times  Decelerations: None    TOCO:   Contraction Frequency (minutes): 0    Labs:   Recent Results (from the past 24 hours)   Comprehensive metabolic panel    Collection Time: 02/22/25  3:46 AM   Result Value Ref Range    Sodium 135 135 - 147 mmol/L    Potassium 3.7 3.5 - 5.3 mmol/L    Chloride 105 96 - 108 mmol/L    CO2 19 (L) 21 - 32 mmol/L    ANION GAP 11 " 4 - 13 mmol/L    BUN 17 5 - 25 mg/dL    Creatinine 0.50 (L) 0.60 - 1.30 mg/dL    Glucose 109 65 - 140 mg/dL    Calcium 8.9 8.4 - 10.2 mg/dL    AST 19 13 - 39 U/L    ALT 15 7 - 52 U/L    Alkaline Phosphatase 56 34 - 104 U/L    Total Protein 6.7 6.4 - 8.4 g/dL    Albumin 3.5 3.5 - 5.0 g/dL    Total Bilirubin 0.16 (L) 0.20 - 1.00 mg/dL    eGFR 127 ml/min/1.73sq m   Protein / creatinine ratio, urine    Collection Time: 02/22/25  3:46 AM   Result Value Ref Range    Creatinine, Ur 127.0 Reference range not established. mg/dL    Protein Urine Random 11.6 Reference range not established. mg/dL    Prot/Creat Ratio, Ur 0.1 0.0 - 0.1   CBC    Collection Time: 02/22/25  4:43 AM   Result Value Ref Range    WBC 12.45 (H) 4.31 - 10.16 Thousand/uL    RBC 4.27 3.81 - 5.12 Million/uL    Hemoglobin 11.5 11.5 - 15.4 g/dL    Hematocrit 35.7 34.8 - 46.1 %    MCV 84 82 - 98 fL    MCH 26.9 26.8 - 34.3 pg    MCHC 32.2 31.4 - 37.4 g/dL    RDW 13.6 11.6 - 15.1 %    Platelets 249 149 - 390 Thousands/uL    MPV 8.6 (L) 8.9 - 12.7 fL     Robert Patino MD  OBGYN PGY-1  02/22/25  8:10 AM

## 2025-02-22 NOTE — DISCHARGE INSTR - AVS FIRST PAGE
Please return if you experience any of the following:  - strong or regular contractions  - vaginal bleeding  - loss of fluid from the vagina  - decreased fetal movement  - worsening headache, visual problems, or right upper abdominal pain

## 2025-02-22 NOTE — ASSESSMENT & PLAN NOTE
Patient presented after elevated BP readings at [facility] and in the ED. No abnormal BP readings in triage; CBC/CMP wnl, P:C 0.1. Patient with occipital headache, improving by time of discharge. She declined medication for her headache. We discussed that while her labs and BPs were normal in triage, persistent headache may be a sign of severe preeclampsia. She was encouraged to return if her headache worsens or does not improve. She expressed understanding  - continue to monitor BP at prenatal visits  - return precautions given

## 2025-02-22 NOTE — ASSESSMENT & PLAN NOTE
NST reactive, no contractions on toco. Patient has no obstetric complaints. Maternal and fetal status overall reassuring at this time  - continue routine prenatal care  - return precautions given

## 2025-02-25 PROBLEM — E66.01 SEVERE OBESITY DUE TO EXCESS CALORIES AFFECTING PREGNANCY IN THIRD TRIMESTER (HCC): Status: ACTIVE | Noted: 2025-02-25

## 2025-02-25 PROBLEM — O99.213 SEVERE OBESITY DUE TO EXCESS CALORIES AFFECTING PREGNANCY IN THIRD TRIMESTER (HCC): Status: ACTIVE | Noted: 2025-02-25

## 2025-02-25 PROBLEM — Z3A.29 29 WEEKS GESTATION OF PREGNANCY: Status: ACTIVE | Noted: 2025-02-25

## 2025-03-03 ENCOUNTER — ULTRASOUND (OUTPATIENT)
Dept: PERINATAL CARE | Facility: OTHER | Age: 35
End: 2025-03-03
Payer: COMMERCIAL

## 2025-03-03 VITALS
DIASTOLIC BLOOD PRESSURE: 86 MMHG | HEIGHT: 65 IN | WEIGHT: 270 LBS | HEART RATE: 108 BPM | SYSTOLIC BLOOD PRESSURE: 112 MMHG | BODY MASS INDEX: 44.98 KG/M2

## 2025-03-03 DIAGNOSIS — Z36.89 ENCOUNTER FOR ULTRASOUND TO ASSESS FETAL GROWTH: ICD-10-CM

## 2025-03-03 DIAGNOSIS — E66.01 SEVERE OBESITY DUE TO EXCESS CALORIES AFFECTING PREGNANCY IN THIRD TRIMESTER (HCC): ICD-10-CM

## 2025-03-03 DIAGNOSIS — O24.410 DIET CONTROLLED GESTATIONAL DIABETES MELLITUS (GDM) IN SECOND TRIMESTER: Primary | ICD-10-CM

## 2025-03-03 DIAGNOSIS — Z3A.29 29 WEEKS GESTATION OF PREGNANCY: ICD-10-CM

## 2025-03-03 DIAGNOSIS — O99.213 SEVERE OBESITY DUE TO EXCESS CALORIES AFFECTING PREGNANCY IN THIRD TRIMESTER (HCC): ICD-10-CM

## 2025-03-03 DIAGNOSIS — Z36.2 ENCOUNTER FOR FOLLOW-UP ULTRASOUND OF FETAL ANATOMY: ICD-10-CM

## 2025-03-03 PROCEDURE — 76816 OB US FOLLOW-UP PER FETUS: CPT | Performed by: OBSTETRICS & GYNECOLOGY

## 2025-03-03 PROCEDURE — 99213 OFFICE O/P EST LOW 20 MIN: CPT | Performed by: OBSTETRICS & GYNECOLOGY

## 2025-03-03 NOTE — PROGRESS NOTES
"[facility]: Araceli Levy was seen today for fetal growth and followup missed anatomy ultrasound.  See ultrasound report under \"OB Procedures\" tab.   Please don't hesitate to contact our office with any concerns or questions.  -Glenys Zaman MD    "

## 2025-03-06 ENCOUNTER — HOSPITAL ENCOUNTER (EMERGENCY)
Facility: HOSPITAL | Age: 35
Discharge: STILL ADMITTED OR ADMINISTRATIVE | End: 2025-03-06
Payer: COMMERCIAL

## 2025-03-06 ENCOUNTER — HOSPITAL ENCOUNTER (OUTPATIENT)
Facility: HOSPITAL | Age: 35
Discharge: HOME | End: 2025-03-06
Attending: STUDENT IN AN ORGANIZED HEALTH CARE EDUCATION/TRAINING PROGRAM | Admitting: STUDENT IN AN ORGANIZED HEALTH CARE EDUCATION/TRAINING PROGRAM
Payer: COMMERCIAL

## 2025-03-06 VITALS
SYSTOLIC BLOOD PRESSURE: 127 MMHG | HEART RATE: 99 BPM | RESPIRATION RATE: 18 BRPM | HEIGHT: 65 IN | BODY MASS INDEX: 44.98 KG/M2 | TEMPERATURE: 97.9 F | OXYGEN SATURATION: 96 % | DIASTOLIC BLOOD PRESSURE: 68 MMHG | WEIGHT: 270 LBS

## 2025-03-06 DIAGNOSIS — Z3A.12 12 WEEKS GESTATION OF PREGNANCY: ICD-10-CM

## 2025-03-06 PROCEDURE — 99214 OFFICE O/P EST MOD 30 MIN: CPT

## 2025-03-06 PROCEDURE — NC001 PR NO CHARGE: Performed by: OBSTETRICS & GYNECOLOGY

## 2025-03-06 RX ORDER — ASPIRIN 81 MG/1
162 TABLET, COATED ORAL DAILY
Qty: 60 TABLET | Refills: 5 | Status: SHIPPED | OUTPATIENT
Start: 2025-03-06

## 2025-03-06 NOTE — PROGRESS NOTES
"L&D Triage Note - OB/GYN  Araceli Levy 34 y.o. female MRN: [id number]  Unit/Bed#:  TRIAGE  Encounter: [id number]        ASSESSMENT  Araceli Levy is a 34 y.o.  at 29w5d who presented for decreased fetal movement. FHT: 145 bpm, reactive and toco showed irritability. ZACHARY: 11.20 cm.  She then felt fetal movement while in triage. The patient does not appear to be in labor and it is safe to discharge home.      1) Decreased fetal movement  - FHT: 145 bpm, reactive   - Rowan showed irritability  - ZACHARY: 11.20 cm.   - Resolved in triage       2) 29 weeks gestation of pregnancy  - Continue routine prenatal care  - Discharge from OB triage with labor precautions                  - Reviewed rupture of membranes, false vs true labor, decreased fetal movement, and vaginal bleeding              - Pt to call provider with any concerns and follow up at her next scheduled prenatal appointment              - Case discussed with Dr. Raphael  ______________    SUBJECTIVE  AKILAH: Estimated Date of Delivery: 25  HPI:  34 y.o.  @29w5d presents with complaint of decreased fetal movement for the past 2 days. This pregnancy is complicated by A1GDM and class III obesity. Her prior pregnancies were complicated by first trimester miscarriage in  .    Contractions: Denies  Leakage of fluid: Denies  Vaginal Bleeding: Denies  Fetal movement: Denies        OBJECTIVE:  Vitals:  /68 (BP Location: Left arm)   Pulse 99   Temp 97.9 °F (36.6 °C) (Oral)   Resp 18   Ht 5' 5\" (1.651 m)   Wt 122 kg (270 lb)   LMP 2024 (Exact Date)   SpO2 96%   BMI 44.93 kg/m²   Body mass index is 44.93 kg/m².    Physical Exam  Vitals reviewed.   Constitutional:       General: She is not in acute distress.     Appearance: Normal appearance.   HENT:      Head: Normocephalic and atraumatic.      Nose: Nose normal.   Eyes:      Conjunctiva/sclera: Conjunctivae normal.   Cardiovascular:      Rate and Rhythm: Normal rate and " regular rhythm.      Pulses: Normal pulses.      Heart sounds: No murmur heard.  Pulmonary:      Effort: Pulmonary effort is normal. No respiratory distress.   Abdominal:      Palpations: Abdomen is soft.      Tenderness: There is no abdominal tenderness.      Comments: Gravid   Musculoskeletal:         General: No swelling. Normal range of motion.      Cervical back: Normal range of motion. No rigidity.   Neurological:      General: No focal deficit present.      Mental Status: She is alert.   Psychiatric:         Mood and Affect: Mood normal.             FHT:  Baseline Rate (FHR): 145 bpm  Variability: Moderate  Accelerations: 10 x 10 (<32 weeks)  Decelerations: None  FHR Category: Category I    TOCO:   Contraction Frequency (minutes): 0  Contraction Duration (seconds): 0    IMAGING:              TAUS   ZACHARY      - Q1 2.59 cm     - Q2 4.53 cm     - Q3 2.82 cm     - Q4 3.85 cm     - Total: 11.20 cm                 Labs: No results found for this or any previous visit (from the past 24 hours).      Signature/Title: George Barcenas DO  Date: 3/6/2025  Time: 4:21 AM

## 2025-03-06 NOTE — DISCHARGE INSTR - AVS FIRST PAGE
- Continue routine prenatal care  - Discharge from OB triage with labor precautions                  - Reviewed rupture of membranes, false vs true labor, decreased fetal movement, and vaginal bleeding              - Pt to call provider with any concerns and follow up at her next scheduled prenatal appointment

## 2025-03-11 ENCOUNTER — HOSPITAL ENCOUNTER (EMERGENCY)
Facility: HOSPITAL | Age: 35
Discharge: STILL ADMITTED OR ADMINISTRATIVE | End: 2025-03-11
Payer: COMMERCIAL

## 2025-03-11 ENCOUNTER — HOSPITAL ENCOUNTER (OUTPATIENT)
Facility: HOSPITAL | Age: 35
Discharge: HOME | End: 2025-03-11
Attending: STUDENT IN AN ORGANIZED HEALTH CARE EDUCATION/TRAINING PROGRAM | Admitting: STUDENT IN AN ORGANIZED HEALTH CARE EDUCATION/TRAINING PROGRAM
Payer: COMMERCIAL

## 2025-03-11 ENCOUNTER — NURSE TRIAGE (OUTPATIENT)
Age: 35
End: 2025-03-11

## 2025-03-11 ENCOUNTER — NURSE TRIAGE (OUTPATIENT)
Dept: OBGYN CLINIC | Facility: MEDICAL CENTER | Age: 35
End: 2025-03-11

## 2025-03-11 VITALS
RESPIRATION RATE: 18 BRPM | DIASTOLIC BLOOD PRESSURE: 83 MMHG | TEMPERATURE: 98.4 F | OXYGEN SATURATION: 98 % | HEART RATE: 103 BPM | SYSTOLIC BLOOD PRESSURE: 152 MMHG

## 2025-03-11 VITALS
RESPIRATION RATE: 20 BRPM | BODY MASS INDEX: 41.82 KG/M2 | TEMPERATURE: 98.5 F | HEART RATE: 103 BPM | DIASTOLIC BLOOD PRESSURE: 51 MMHG | WEIGHT: 251 LBS | SYSTOLIC BLOOD PRESSURE: 103 MMHG | HEIGHT: 65 IN

## 2025-03-11 PROCEDURE — 99214 OFFICE O/P EST MOD 30 MIN: CPT

## 2025-03-11 PROCEDURE — NC001 PR NO CHARGE: Performed by: OBSTETRICS & GYNECOLOGY

## 2025-03-11 NOTE — PATIENT COMMUNICATION
Attempted to contact patient for futher discussion.  L/m on v/m to return call. MY chart message sent

## 2025-03-11 NOTE — TELEPHONE ENCOUNTER
"FOLLOW UP: Go to L&D NOW    REASON FOR CONVERSATION: Decreased Fetal Movement    SYMPTOMS: Decreased fetal movement yesterday and today.  Not meeting kick counts.  Pain left ribs under breast.    OTHER: 30w3d, .  Denies vaginal bleeding/LOF, abd pain.      DISPOSITION: Go to LD Now (Or to Office With PCP Approval)    Reason for Disposition   Pregnant 23 or more weeks and baby moving less today by kick count (e.g., kick count < 5 in 1 hour or < 10 in 2 hours)    Answer Assessment - Initial Assessment Questions  1. FETAL MOVEMENT: \"Has the baby's movement decreased or changed significantly from normal?\" (e.g., yes, no; describe) \"When was the last time you felt the baby move?\" (e.g., minutes, hours)      Yesterday and today, decreased movement.  This morning.  2. AKILAH: \"What date are you expecting to deliver?\"       25  3. PREGNANCY: \"How many weeks pregnant are you?\" \"How has the pregnancy been going?\"      30w3d  4. OTHER SYMPTOMS: \"Do you have any other symptoms?\" (e.g., abdomen pain, fever, leaking fluid from vagina, vaginal bleeding, widespread itching, etc.)      Left sided rib pain under breast x 2 days.    Protocols used: Pregnancy - Decreased or Abnormal Fetal Movement-Adult-[place]    "

## 2025-03-11 NOTE — PROGRESS NOTES
"L&D Triage Note - OB/GYN  Araceli Levy 34 y.o. female MRN: [id number]  Unit/Bed#:  TRIAGE  Encounter: [id number]    ASSESSMENT  Araceli Levy is a 34 y.o.  at 30w3d presenting with DFM. NST reactive. ZACHARY wnl. Movement visualized via TAUS.  Patient safe to be discharged home with return precautions.  Encouraged patient to call her office if decreased fetal movement persists.    PLAN  #1.  Decreased fetal movement  NST reactive: 145 bpm, 10x10 accels, no decels, moderate variability  ZACHARY 17.65cm   Patient visualized on TAUS    Discharge instructions  Patient instructed to call if experiencing worsening contractions, vaginal bleeding, loss of fluid or decreased fetal movement.  Will follow up with OBGYN on 3/11/2025.    D/w Dr. Yi  ______________    SUBJECTIVE    AKILAH: Estimated Date of Delivery: 25    HPI:  34 y.o.  30w3d presents with complaint of decreased fetal movement.  She reports she felt tired baby move a couple days ago, and was very active, but now for the last 2 days she has felt no movement.  This is not the first time this has happened to patient.  She explains she has been counseled on meeting her kick counts but she has a hard time given how sporadic her baby's movements are.    Contractions: denies  Leakage of fluid: denies  Vaginal Bleeding: denies  Fetal movement: present    Her obstetrical history is unremarkable    ROS:  Constitutional: Negative  Respiratory: Negative  Cardiovascular: Negative    Gastrointestinal: Negative    OBJECTIVE:    Vitals:  /51 (BP Location: Right arm)   Pulse 103   Resp 20   Ht 5' 5\" (1.651 m)   Wt 114 kg (251 lb)   LMP 2024 (Exact Date)   BMI 41.77 kg/m²   Body mass index is 41.77 kg/m².    Physical Exam  Vitals and nursing note reviewed.   Constitutional:       General: She is not in acute distress.     Appearance: She is well-developed.   HENT:      Head: Normocephalic and atraumatic.   Eyes:      " Conjunctiva/sclera: Conjunctivae normal.   Cardiovascular:      Rate and Rhythm: Normal rate and regular rhythm.      Heart sounds: No murmur heard.  Pulmonary:      Effort: Pulmonary effort is normal. No respiratory distress.      Breath sounds: Normal breath sounds.   Abdominal:      Palpations: Abdomen is soft.      Tenderness: There is no abdominal tenderness.   Musculoskeletal:         General: No swelling.      Cervical back: Neck supple.   Skin:     General: Skin is warm and dry.      Capillary Refill: Capillary refill takes less than 2 seconds.   Neurological:      Mental Status: She is alert.   Psychiatric:         Mood and Affect: Mood normal.       SVE:   Deferred    FHT:  Baseline Rate (FHR): 145 bpm  Variability: Moderate  Accelerations: 10 x 10 (<32 weeks)  Decelerations: None  FHR Category: Category I    TOCO:   Contraction Frequency (minutes): 0  Contraction Duration (seconds): 0    IMAGING:         TAUS   ZACHARY      - Q1 4.92 cm     - Q2 4.79 cm     - Q3 3.49 cm     - Q4 4.45 cm     - Total: 17.65 cm   Placenta: posterior   Presentation: cephalic              Labs: No results found for this or any previous visit (from the past 24 hours).      Jinny Pruitt MD  3/11/2025  1:22 PM

## 2025-03-25 PROBLEM — Z3A.32 32 WEEKS GESTATION OF PREGNANCY: Status: ACTIVE | Noted: 2025-02-25

## 2025-03-26 ENCOUNTER — TELEPHONE (OUTPATIENT)
Age: 35
End: 2025-03-26

## 2025-03-26 NOTE — TELEPHONE ENCOUNTER
Pt called and wanted a return call to confirm that she will not have the same tech on her appt for 03/28 from her last visit. Pt stated that she had relayed this information with the  on her last appt.

## 2025-03-26 NOTE — TELEPHONE ENCOUNTER
Patient called to follow up on request for this Friday's appt 3/28.     Pt did not have a positive experience at last appt with Referral[employer]andy Concetta CABRERA Pt was advised at , Akiko LUNA Or another tech can be assigned to their appt.   Attempted to contact office to confirm. Please reach out to pt to confirm, okay with detailed vm if unavailable since they are heading into work.

## 2025-03-26 NOTE — TELEPHONE ENCOUNTER
Pt  called  back  to confirm  she  does   not have  Concetta   as  her  tech  on 03/28  and  request  a call back   so she can  advise her job   if  she has  to r/s

## 2025-03-27 NOTE — TELEPHONE ENCOUNTER
Spoke with patient and confirmed that JAMISON will not be sonographer, instead Cat is assigned to room 2. Pt okay with plan. Practice Manager Mei and Sonographer Lead Kristen made aware of below concern, to be addressed.

## 2025-03-28 ENCOUNTER — ULTRASOUND (OUTPATIENT)
Dept: PERINATAL CARE | Facility: OTHER | Age: 35
End: 2025-03-28
Payer: COMMERCIAL

## 2025-03-28 VITALS
BODY MASS INDEX: 45.29 KG/M2 | HEIGHT: 65 IN | DIASTOLIC BLOOD PRESSURE: 74 MMHG | WEIGHT: 271.8 LBS | SYSTOLIC BLOOD PRESSURE: 112 MMHG | HEART RATE: 106 BPM

## 2025-03-28 DIAGNOSIS — O24.410 DIET CONTROLLED GESTATIONAL DIABETES MELLITUS (GDM) IN SECOND TRIMESTER: ICD-10-CM

## 2025-03-28 DIAGNOSIS — Z36.89 ENCOUNTER FOR ULTRASOUND TO ASSESS FETAL GROWTH: ICD-10-CM

## 2025-03-28 DIAGNOSIS — Z3A.32 32 WEEKS GESTATION OF PREGNANCY: Primary | ICD-10-CM

## 2025-03-28 DIAGNOSIS — E66.01 SEVERE OBESITY DUE TO EXCESS CALORIES AFFECTING PREGNANCY IN THIRD TRIMESTER (HCC): ICD-10-CM

## 2025-03-28 DIAGNOSIS — O99.213 SEVERE OBESITY DUE TO EXCESS CALORIES AFFECTING PREGNANCY IN THIRD TRIMESTER (HCC): ICD-10-CM

## 2025-03-28 DIAGNOSIS — O24.410 DIET CONTROLLED GESTATIONAL DIABETES MELLITUS (GDM) IN THIRD TRIMESTER: ICD-10-CM

## 2025-03-28 PROCEDURE — 76816 OB US FOLLOW-UP PER FETUS: CPT | Performed by: OBSTETRICS & GYNECOLOGY

## 2025-03-28 PROCEDURE — 99214 OFFICE O/P EST MOD 30 MIN: CPT | Performed by: OBSTETRICS & GYNECOLOGY

## 2025-03-30 NOTE — PROGRESS NOTES
"[facility]: Araceli was seen today for fetal growth assessment ultrasound.  See ultrasound report under \"OB Procedures\" tab.       -----------------------------------------    The time spent on this established patient on the encounter date included 2 minutes previsit service time reviewing records and precharting, 5 minutes face-to-face service time counseling regarding results and coordinating care, and  4 minutes charting, totalling 11 minutes.  Please don't hesitate to contact our office with any concerns or questions.  -Nathalia Marquez MD  "

## 2025-04-03 PROBLEM — Z3A.34 34 WEEKS GESTATION OF PREGNANCY: Status: ACTIVE | Noted: 2025-04-03

## 2025-04-07 NOTE — PATIENT INSTRUCTIONS
"Patient Education     Your baby's movement before birth   The Basics   Written by the doctors and editors at [facility]   When should I start feeling my baby move? -- It depends. Most people first feel their baby moving in the uterus between about 16 and 20 weeks of pregnancy. It might take longer to feel movement if this is your first pregnancy or if the placenta is in the front of your uterus.  What kinds of movements should I feel? -- When you first feel your baby move, it might feel like a gentle flutter in your belly. This is sometimes called \"quickening.\" As the baby grows, their movements will get stronger. You will probably feel them kicking, rolling, and stretching. Later in pregnancy, you might be able to see and feel the baby moving from the outside.  You might notice that your baby is more active at certain times of the day or night. Even before birth, babies have periods of being asleep and awake. When your baby is sleeping, you might notice that they do not move as much.  Should I keep track of my baby's movements? -- If your pregnancy is healthy, you probably do not need to count or record your baby's movements. Feeling regular movement is a good sign that the baby is doing well.  In some cases, your doctor or midwife might ask you to keep track of your baby's movements. If so, they will tell you how to do this and when to call them.  A change in your baby's movements does not always mean that there is a problem. But in some cases, it can be a sign that the baby is having trouble. If your doctor or midwife is concerned, they can do tests to check on the baby.  If I am asked to track movement, how should I do it? -- There are different ways of tracking your baby's movement. This is sometimes called \"kick counting.\"  Your doctor or midwife will tell you exactly what to track. For example, they might ask you to write down:   How long it takes to feel 10 kicks or movements   How many times your baby moves " in 1 hour  Many experts consider at least 10 movements in 2 hours to be a sign that the baby is doing well. But there is no specific cutoff for exactly how much movement is healthy or unhealthy. Some babies are more active than others, and some pregnant people feel movement more easily than others. The main goal of kick counting is to get to know your baby's normal patterns so you can tell if anything changes.  If you are doing kick counting:   Choose a time of day when your baby is usually active.   Find a quiet place where you will not be distracted.   Lie down on your side in a comfortable position.   Check the clock, or set a timer.   Each time you feel your baby move or kick, write down the time. Some people use a smartphone yael to keep track.   If your baby seems less active than usual, try moving around, eating a snack, and emptying your bladder. This can help wake the baby up if they are asleep.   Stop counting after you have felt 10 kicks, or after the length of time your doctor or midwife told you.  When should I call the doctor? -- Call your doctor or midwife for advice if:   You have concerns about your baby's movement.   Your baby is moving less than they normally do.   You notice a sudden change in the pattern of your baby's movements.   You have any other symptoms that worry you.  All topics are updated as new evidence becomes available and our peer review process is complete.  This topic retrieved from [employer] on: Feb 26, 2024.  Topic 626249 Version 1.0  Release: 32.2.4 - C32.56  © 2024 UpToDate, Inc. and/or its affiliates. All rights reserved.  Consumer Information Use and Disclaimer   Disclaimer: This generalized information is a limited summary of diagnosis, treatment, and/or medication information. It is not meant to be comprehensive and should be used as a tool to help the user understand and/or assess potential diagnostic and treatment options. It does NOT include all information about  conditions, treatments, medications, side effects, or risks that may apply to a specific patient. It is not intended to be medical advice or a substitute for the medical advice, diagnosis, or treatment of a health care provider based on the health care provider's examination and assessment of a patient's specific and unique circumstances. Patients must speak with a health care provider for complete information about their health, medical questions, and treatment options, including any risks or benefits regarding use of medications. This information does not endorse any treatments or medications as safe, effective, or approved for treating a specific patient. UpToDate, Inc. and its affiliates disclaim any warranty or liability relating to this information or the use thereof.The use of this information is governed by the Terms of Use, available at https://www."[employer].com/en/know/clinical-effectiveness-terms. © UpToDate, Inc. and its affiliates and/or licensors. All rights reserved.  Copyright   ©  UpToDate, Inc. and/or its affiliates. All rights reserved.  Thank you for choosing us for your  care today.  If you have any questions about your ultrasound or care, please do not hesitate to contact us or your primary obstetrician.        Some general instructions for your pregnancy are:    Exercise: Aim for 150 minutes per week of regular exercise.  Walking is great!  Nutrition: Choose healthy sources of calcium, iron, and protein.  Avoid ultraprocessed foods and added sugar.  Learn about Preeclampsia: preeclampsia is a common, potentially serious high blood pressure complication in pregnancy.  A blood pressure of 140mmHg (systolic or top number) or 90mmHg (diastolic or bottom number) should be evaluated by your doctor.  Aspirin is sometimes prescribed in early pregnancy to prevent preeclampsia in women with risk factors - ask your obstetrician if you should be on this medication.  For more  resources, visit:  https://www.highriskpregnancyinfo.org/preeclampsia  If you smoke, please try to quit completely but also try to reduce your smoking by as much as possible (as soon as possible).  Do not vape.  Please also avoid cannabis products.  Other warning signs to watch out for in pregnancy or postpartum: chest pain, obstructed breathing or shortness of breath, seizures, thoughts of hurting yourself or your baby, bleeding, a painful or swollen leg, fever, or headache (see AWNN POST-BIRTH Warning Signs campaign).  If these happen call 911.  Itching is also not normal in pregnancy and if you experience this, especially over your hands and feet, potentially worse at night, notify your doctors.     Kick Counts in Pregnancy   AMBULATORY CARE:   Kick counts  measure how much your baby is moving in your womb. A kick from your baby can be felt as a twist, turn, swish, roll, or jab. It is common to feel your baby kicking at 26 to 28 weeks of pregnancy. You may feel your baby kick as early as 20 weeks of pregnancy. You may want to start counting at 28 weeks.   Contact your doctor immediately if:   You feel a change in the number of kicks or movements of your baby.      You feel fewer than 10 kicks within 2 hours.      You have questions or concerns about your baby's movements.     Why measure kick counts:  Your baby's movement may provide information about your baby's health. He or she may move less, or not at all, if there are problems. Your baby may move less if he or she is not getting enough oxygen or nutrition from the placenta. Do not smoke while you are pregnant. Smoking decreases the amount of oxygen that gets to your baby. Talk to your healthcare provider if you need help to quit smoking. Tell your healthcare provider as soon as you feel a change in your baby's movements.  When to measure kick counts:   Measure kick counts at the same time every day.       Measure kick counts when your baby is awake and  most active. Your baby may be most active in the evening.     How to measure kick counts:  Check that your baby is awake before you measure kick counts. You can wake up your baby by lightly pushing on your belly, walking, or drinking something cold. Your healthcare provider may tell you different ways to measure kick counts. You may be told to do the following:  Use a chart or clock to keep track of the time you start and finish counting.      Sit in a chair or lie on your left side.      Place your hands on the largest part of your belly.      Count until you reach 10 kicks. Write down how much time it takes to count 10 kicks.      It may take 30 minutes to 2 hours to count 10 kicks. It should not take more than 2 hours to count 10 kicks.     Follow up with your doctor as directed:  Write down your questions so you remember to ask them during your visits.   © Copyright [employer] 2023 Information is for End User's use only and may not be sold, redistributed or otherwise used for commercial purposes.  The above information is an  only. It is not intended as medical advice for individual conditions or treatments. Talk to your doctor, nurse or pharmacist before following any medical regimen to see if it is safe and effective for you. Nonstress Test for Pregnancy   WHAT YOU NEED TO KNOW:   What do I need to know about a nonstress test?  A nonstress test measures your baby's heart rate and movements. Nonstress means that no stress will be placed on your baby during the test.  How do I prepare for a nonstress test?  Your healthcare provider will talk to you about how to prepare for this test. Your provider may tell you to eat and drink plenty of liquids before your test. If you smoke, you may be asked not to smoke within 2 hours before the test. Your provider will also tell you which medicines to take or not take on the day of your test.  What will happen during a nonstress test?  You may be asked to lie  down or recline back for the test on a bed. One or 2 belts with sensors will be placed around your abdomen. Your baby's heart rate will be recorded with a machine. If your baby does not move, your baby may be asleep. Your healthcare provider may make a noise near your abdomen to try to wake your baby. The test usually takes about 20 minutes, but can take longer if your baby needs to be awakened.        What do I need to know about the test results?  Your baby will be expected to move at least 2 times for a certain amount of time. Your baby's heart rate will be expected to go up by a certain number of beats per minute during movement. If your baby does not move as expected, the test may need to be repeated or you may need other tests.  CARE AGREEMENT:   You have the right to help plan your care. Learn about your health condition and how it may be treated. Discuss treatment options with your healthcare providers to decide what care you want to receive. You always have the right to refuse treatment. The above information is an  only. It is not intended as medical advice for individual conditions or treatments. Talk to your doctor, nurse or pharmacist before following any medical regimen to see if it is safe and effective for you.  © Copyright [employer] 2023 Information is for End User's use only and may not be sold, redistributed or otherwise used for commercial purposes.

## 2025-04-09 ENCOUNTER — ROUTINE PRENATAL (OUTPATIENT)
Dept: OBGYN CLINIC | Facility: MEDICAL CENTER | Age: 35
End: 2025-04-09
Payer: COMMERCIAL

## 2025-04-09 VITALS
WEIGHT: 272 LBS | DIASTOLIC BLOOD PRESSURE: 70 MMHG | BODY MASS INDEX: 45.32 KG/M2 | HEART RATE: 113 BPM | SYSTOLIC BLOOD PRESSURE: 122 MMHG | HEIGHT: 65 IN

## 2025-04-09 DIAGNOSIS — E66.01 MATERNAL MORBID OBESITY, ANTEPARTUM (HCC): ICD-10-CM

## 2025-04-09 DIAGNOSIS — O99.210 MATERNAL MORBID OBESITY, ANTEPARTUM (HCC): ICD-10-CM

## 2025-04-09 DIAGNOSIS — O24.410 DIET CONTROLLED GESTATIONAL DIABETES MELLITUS (GDM) IN THIRD TRIMESTER: ICD-10-CM

## 2025-04-09 DIAGNOSIS — Z34.83 PRENATAL CARE, SUBSEQUENT PREGNANCY, THIRD TRIMESTER: ICD-10-CM

## 2025-04-09 DIAGNOSIS — R03.0 ELEVATED BLOOD PRESSURE READING WITHOUT DIAGNOSIS OF HYPERTENSION: ICD-10-CM

## 2025-04-09 DIAGNOSIS — Z3A.34 34 WEEKS GESTATION OF PREGNANCY: Primary | ICD-10-CM

## 2025-04-09 PROBLEM — O36.8190 DECREASED FETAL MOVEMENT: Status: RESOLVED | Noted: 2025-01-09 | Resolved: 2025-04-09

## 2025-04-09 PROBLEM — N89.8 VAGINAL DISCHARGE: Status: RESOLVED | Noted: 2025-01-24 | Resolved: 2025-04-09

## 2025-04-09 LAB
SL AMB  POCT GLUCOSE, UA: ABNORMAL
SL AMB POCT URINE PROTEIN: ABNORMAL

## 2025-04-09 PROCEDURE — 81002 URINALYSIS NONAUTO W/O SCOPE: CPT | Performed by: CLINICAL NURSE SPECIALIST

## 2025-04-09 PROCEDURE — 99214 OFFICE O/P EST MOD 30 MIN: CPT | Performed by: CLINICAL NURSE SPECIALIST

## 2025-04-09 NOTE — ASSESSMENT & PLAN NOTE
GDM- BG appear largely nml - a few scattered elevations.  Growth US 3/28 and EFW 32%   Starting A/N surveillance weekly

## 2025-04-09 NOTE — ASSESSMENT & PLAN NOTE
Pt had elevated BP at [facility] on 2/22/25 and seen in triage and BP nml in triage and w/u unremarkable   Nml BP since. Today is 112/70  No c/o HA, vision changes or RUQ pain

## 2025-04-09 NOTE — ASSESSMENT & PLAN NOTE
, 34w4d  Insufficient PNC- last visit 25- 3 months w/o   Has seen MFM and was on L&D triage in between  Reports mostly weather related.    She reports good FM. Denies LOF or VB.  Has some occasional ctx here and there, nothing timeable but is requesting cervical exam. Reassured BH type ctx are random and can occur anytime after 20wks.    Blood Pressure: 122/70    Exam with normal appearing d/c  Cervix is closed/thick/high    The patient was counseled about the labor process. She was counseled regarding potential reasons that she may need a  section including arrest of dilation/descent, non-reassuring fetal status, or worsening maternal status.      She was counseled on the risks of  including bleeding, infection, and injury to surrounding structures including the bowel and bladder. She was counseled that there are medical and surgical methods to manage excessive postpartum bleeding. She was counseled that in the event of excessive blood loss, she may require blood transfusion which includes a small risk of blood borne diseases such as hepatitis and HIV. The patient is OK with receiving a blood transfusion if necessary.     She was counseled about the possible need for operative delivery using the vacuum or forceps and the indications for doing so. She was counseled that there is a small risk of  complications including intracranial hemorrhage, lacerations, nerve damage or fracture as well as the increased risk for more severe maternal laceration. She would refuse Forceps during delivery. The patient had an opportunity to ask questions and signed consent.     Declined tdap today  Again briefly reviewed PP contraception. Pt is undecided.  Reviewed common mood changes towards end of pregnancy and s/s Postpartum depression to be aware of.   Reviewed benefits of perineal massage - to be done 1-4 times per week x 5-10 minutes  Educated on GBS culture which will be completed at the 36 wk  visit.   We again reviewed the S/S PTL and importance of consistent/regular FM. Reviewed process for FKC and encouraged pt to call with any decreases in fetal movement.

## 2025-04-09 NOTE — PROGRESS NOTES
:  Assessment & Plan  34 weeks gestation of pregnancy  , 34w4d  Insufficient PNC- last visit 25- 3 months w/o   Has seen MFM and was on L&D triage in between  Reports mostly weather related.    She reports good FM. Denies LOF or VB.  Has some occasional ctx here and there, nothing timeable but is requesting cervical exam. Reassured BH type ctx are random and can occur anytime after 20wks.    Blood Pressure: 122/70    Exam with normal appearing d/c  Cervix is closed/thick/high    The patient was counseled about the labor process. She was counseled regarding potential reasons that she may need a  section including arrest of dilation/descent, non-reassuring fetal status, or worsening maternal status.      She was counseled on the risks of  including bleeding, infection, and injury to surrounding structures including the bowel and bladder. She was counseled that there are medical and surgical methods to manage excessive postpartum bleeding. She was counseled that in the event of excessive blood loss, she may require blood transfusion which includes a small risk of blood borne diseases such as hepatitis and HIV. The patient is OK with receiving a blood transfusion if necessary.     She was counseled about the possible need for operative delivery using the vacuum or forceps and the indications for doing so. She was counseled that there is a small risk of  complications including intracranial hemorrhage, lacerations, nerve damage or fracture as well as the increased risk for more severe maternal laceration. She would refuse Forceps during delivery. The patient had an opportunity to ask questions and signed consent.     Declined tdap today  Again briefly reviewed PP contraception. Pt is undecided.  Reviewed common mood changes towards end of pregnancy and s/s Postpartum depression to be aware of.   Reviewed benefits of perineal massage - to be done 1-4 times per week x 5-10  minutes  Educated on GBS culture which will be completed at the 36 wk visit.   We again reviewed the S/S PTL and importance of consistent/regular FM. Reviewed process for FKC and encouraged pt to call with any decreases in fetal movement.         Maternal morbid obesity, antepartum (HCC)  TWG 10.3 kg (22 lb 12.8 oz)   Now GDM.- see that A&P  Taking Low Dose Aspirin -reminded to stop at 36 wks       Diet controlled gestational diabetes mellitus (GDM) in third trimester  GDM- BG appear largely nml - a few scattered elevations.  Growth US 3/28 and EFW 32%   Starting A/N surveillance weekly          Prenatal care, subsequent pregnancy, third trimester    Orders:  •  POCT urine dip  •  Anemia Panel w/Reflex, OB; Future  •  CBC and differential; Future  •  RPR-Syphilis Screening (Total Syphilis IGG/IGM); Future    Elevated blood pressure reading without diagnosis of hypertension  Pt had elevated BP at [facility] on 2/22/25 and seen in triage and BP nml in triage and w/u unremarkable   Nml BP since. Today is 112/70  No c/o HA, vision changes or RUQ pain

## 2025-04-09 NOTE — ASSESSMENT & PLAN NOTE
TWG 10.3 kg (22 lb 12.8 oz)   Now GDM.- see that A&P  Taking Low Dose Aspirin -reminded to stop at 36 wks

## 2025-04-11 ENCOUNTER — ULTRASOUND (OUTPATIENT)
Dept: PERINATAL CARE | Facility: OTHER | Age: 35
End: 2025-04-11
Payer: COMMERCIAL

## 2025-04-11 ENCOUNTER — APPOINTMENT (OUTPATIENT)
Dept: LAB | Facility: CLINIC | Age: 35
End: 2025-04-11
Payer: COMMERCIAL

## 2025-04-11 VITALS
DIASTOLIC BLOOD PRESSURE: 63 MMHG | HEIGHT: 65 IN | SYSTOLIC BLOOD PRESSURE: 126 MMHG | BODY MASS INDEX: 46.22 KG/M2 | WEIGHT: 277.4 LBS | HEART RATE: 88 BPM

## 2025-04-11 DIAGNOSIS — E66.01 SEVERE OBESITY DUE TO EXCESS CALORIES AFFECTING PREGNANCY IN THIRD TRIMESTER (HCC): Primary | ICD-10-CM

## 2025-04-11 DIAGNOSIS — Z3A.34 34 WEEKS GESTATION OF PREGNANCY: ICD-10-CM

## 2025-04-11 DIAGNOSIS — O99.213 SEVERE OBESITY DUE TO EXCESS CALORIES AFFECTING PREGNANCY IN THIRD TRIMESTER (HCC): Primary | ICD-10-CM

## 2025-04-11 DIAGNOSIS — Z34.83 PRENATAL CARE, SUBSEQUENT PREGNANCY, THIRD TRIMESTER: ICD-10-CM

## 2025-04-11 LAB
BASOPHILS # BLD AUTO: 0.04 THOUSANDS/ÂΜL (ref 0–0.1)
BASOPHILS NFR BLD AUTO: 0 % (ref 0–1)
EOSINOPHIL # BLD AUTO: 0.37 THOUSAND/ÂΜL (ref 0–0.61)
EOSINOPHIL NFR BLD AUTO: 3 % (ref 0–6)
ERYTHROCYTE [DISTWIDTH] IN BLOOD BY AUTOMATED COUNT: 14.1 % (ref 11.6–15.1)
HCT VFR BLD AUTO: 37.6 % (ref 34.8–46.1)
HGB BLD-MCNC: 12.2 G/DL (ref 11.5–15.4)
IMM GRANULOCYTES # BLD AUTO: 0.11 THOUSAND/UL (ref 0–0.2)
IMM GRANULOCYTES NFR BLD AUTO: 1 % (ref 0–2)
LYMPHOCYTES # BLD AUTO: 1.99 THOUSANDS/ÂΜL (ref 0.6–4.47)
LYMPHOCYTES NFR BLD AUTO: 18 % (ref 14–44)
MCH RBC QN AUTO: 26.2 PG (ref 26.8–34.3)
MCHC RBC AUTO-ENTMCNC: 32.4 G/DL (ref 31.4–37.4)
MCV RBC AUTO: 81 FL (ref 82–98)
MONOCYTES # BLD AUTO: 0.77 THOUSAND/ÂΜL (ref 0.17–1.22)
MONOCYTES NFR BLD AUTO: 7 % (ref 4–12)
NEUTROPHILS # BLD AUTO: 7.77 THOUSANDS/ÂΜL (ref 1.85–7.62)
NEUTS SEG NFR BLD AUTO: 71 % (ref 43–75)
NRBC BLD AUTO-RTO: 0 /100 WBCS
PLATELET # BLD AUTO: 284 THOUSANDS/UL (ref 149–390)
PMV BLD AUTO: 9 FL (ref 8.9–12.7)
RBC # BLD AUTO: 4.66 MILLION/UL (ref 3.81–5.12)
WBC # BLD AUTO: 11.05 THOUSAND/UL (ref 4.31–10.16)

## 2025-04-11 PROCEDURE — 85025 COMPLETE CBC W/AUTO DIFF WBC: CPT

## 2025-04-11 PROCEDURE — 59025 FETAL NON-STRESS TEST: CPT

## 2025-04-11 PROCEDURE — 76815 OB US LIMITED FETUS(S): CPT

## 2025-04-11 PROCEDURE — 36415 COLL VENOUS BLD VENIPUNCTURE: CPT

## 2025-04-11 PROCEDURE — 86780 TREPONEMA PALLIDUM: CPT

## 2025-04-11 NOTE — PROGRESS NOTES
Non-Stress Testing:    Non-Stress test, equipment, procedure, and expected outcomes explained. Reviewed fetal kick counts and when to call OB.Verified patient understanding of fetal kick counts with teach back method. Patient reports feeling daily fetal movements. Patient has no questions or concerns.     Reviewed non-stress test with STEW Bella in-person

## 2025-04-11 NOTE — PROGRESS NOTES
"[facility]: Ms. Levy was seen today at 34w6d gestational age for NST (found under the pregnancy episode) which I reviewed the RN assessment and agree, and ZACHARY (see ultrasound report under OB procedures tab).  See ultrasound report under \"OB Procedures\" tab.    Teresa MATHIAS    "

## 2025-04-12 ENCOUNTER — RESULTS FOLLOW-UP (OUTPATIENT)
Dept: LABOR AND DELIVERY | Facility: HOSPITAL | Age: 35
End: 2025-04-12

## 2025-04-12 LAB — TREPONEMA PALLIDUM IGG+IGM AB [PRESENCE] IN SERUM OR PLASMA BY IMMUNOASSAY: NORMAL

## 2025-04-14 NOTE — PATIENT INSTRUCTIONS
Thank you for choosing us for your  care today.  If you have any questions about your ultrasound or care, please do not hesitate to contact us or your primary obstetrician.        Some general instructions for your pregnancy are:    Exercise: Aim for 150 minutes per week of regular exercise.  Walking is great!  Nutrition: Choose healthy sources of calcium, iron, and protein.  Avoid ultraprocessed foods and added sugar.  Learn about Preeclampsia: preeclampsia is a common, potentially serious high blood pressure complication in pregnancy.  A blood pressure of 140mmHg (systolic or top number) or 90mmHg (diastolic or bottom number) should be evaluated by your doctor.  Aspirin is sometimes prescribed in early pregnancy to prevent preeclampsia in women with risk factors - ask your obstetrician if you should be on this medication.  For more resources, visit:  https://[email or web address].[email or web address]  If you smoke, please try to quit completely but also try to reduce your smoking by as much as possible (as soon as possible).  Do not vape.  Please also avoid cannabis products.  Other warning signs to watch out for in pregnancy or postpartum: chest pain, obstructed breathing or shortness of breath, seizures, thoughts of hurting yourself or your baby, bleeding, a painful or swollen leg, fever, or headache (see [facility] Sign[facility]).  If these happen call 911.  Itching is also not normal in pregnancy and if you experience this, especially over your hands and feet, potentially worse at night, notify your doctors.     Kick Counts in Pregnancy   AMBULATORY CARE:   Kick counts  measure how much your baby is moving in your womb. A kick from your baby can be felt as a twist, turn, swish, roll, or jab. It is common to feel your baby kicking at 26 to 28 weeks of pregnancy. You may feel your baby kick as early as 20 weeks of pregnancy. You may want to start counting at 28 weeks.   Contact your  doctor immediately if:   You feel a change in the number of kicks or movements of your baby.      You feel fewer than 10 kicks within 2 hours.      You have questions or concerns about your baby's movements.     Why measure kick counts:  Your baby's movement may provide information about your baby's health. He or she may move less, or not at all, if there are problems. Your baby may move less if he or she is not getting enough oxygen or nutrition from the placenta. Do not smoke while you are pregnant. Smoking decreases the amount of oxygen that gets to your baby. Talk to your healthcare provider if you need help to quit smoking. Tell your healthcare provider as soon as you feel a change in your baby's movements.  When to measure kick counts:   Measure kick counts at the same time every day.       Measure kick counts when your baby is awake and most active. Your baby may be most active in the evening.     How to measure kick counts:  Check that your baby is awake before you measure kick counts. You can wake up your baby by lightly pushing on your belly, walking, or drinking something cold. Your healthcare provider may tell you different ways to measure kick counts. You may be told to do the following:  Use a chart or clock to keep track of the time you start and finish counting.      Sit in a chair or lie on your left side.      Place your hands on the largest part of your belly.      Count until you reach 10 kicks. Write down how much time it takes to count 10 kicks.      It may take 30 minutes to 2 hours to count 10 kicks. It should not take more than 2 hours to count 10 kicks.     Follow up with your doctor as directed:  Write down your questions so you remember to ask them during your visits.   © Copyright [employer] 2023 Information is for End User's use only and may not be sold, redistributed or otherwise used for commercial purposes.  The above information is an  only. It is not intended as  medical advice for individual conditions or treatments. Talk to your doctor, nurse or pharmacist before following any medical regimen to see if it is safe and effective for you. Nonstress Test for Pregnancy   WHAT YOU NEED TO KNOW:   What do I need to know about a nonstress test?  A nonstress test measures your baby's heart rate and movements. Nonstress means that no stress will be placed on your baby during the test.  How do I prepare for a nonstress test?  Your healthcare provider will talk to you about how to prepare for this test. Your provider may tell you to eat and drink plenty of liquids before your test. If you smoke, you may be asked not to smoke within 2 hours before the test. Your provider will also tell you which medicines to take or not take on the day of your test.  What will happen during a nonstress test?  You may be asked to lie down or recline back for the test on a bed. One or 2 belts with sensors will be placed around your abdomen. Your baby's heart rate will be recorded with a machine. If your baby does not move, your baby may be asleep. Your healthcare provider may make a noise near your abdomen to try to wake your baby. The test usually takes about 20 minutes, but can take longer if your baby needs to be awakened.        What do I need to know about the test results?  Your baby will be expected to move at least 2 times for a certain amount of time. Your baby's heart rate will be expected to go up by a certain number of beats per minute during movement. If your baby does not move as expected, the test may need to be repeated or you may need other tests.  CARE AGREEMENT:   You have the right to help plan your care. Learn about your health condition and how it may be treated. Discuss treatment options with your healthcare providers to decide what care you want to receive. You always have the right to refuse treatment. The above information is an  only. It is not intended as  medical advice for individual conditions or treatments. Talk to your doctor, nurse or pharmacist before following any medical regimen to see if it is safe and effective for you.  © Copyright [employer] 2023 Information is for End User's use only and may not be sold, redistributed or otherwise used for commercial purposes.

## 2025-04-16 PROBLEM — Z3A.35 35 WEEKS GESTATION OF PREGNANCY: Status: ACTIVE | Noted: 2025-04-16

## 2025-04-17 PROBLEM — Z3A.36 36 WEEKS GESTATION OF PREGNANCY: Status: ACTIVE | Noted: 2025-04-17

## 2025-04-18 ENCOUNTER — ULTRASOUND (OUTPATIENT)
Dept: PERINATAL CARE | Facility: OTHER | Age: 35
End: 2025-04-18
Payer: COMMERCIAL

## 2025-04-18 ENCOUNTER — APPOINTMENT (OUTPATIENT)
Dept: LAB | Facility: HOSPITAL | Age: 35
End: 2025-04-18
Payer: COMMERCIAL

## 2025-04-18 ENCOUNTER — NURSE TRIAGE (OUTPATIENT)
Age: 35
End: 2025-04-18

## 2025-04-18 VITALS
BODY MASS INDEX: 45.68 KG/M2 | WEIGHT: 274.2 LBS | HEART RATE: 107 BPM | SYSTOLIC BLOOD PRESSURE: 122 MMHG | DIASTOLIC BLOOD PRESSURE: 72 MMHG | HEIGHT: 65 IN | OXYGEN SATURATION: 97 %

## 2025-04-18 DIAGNOSIS — O99.213 SEVERE OBESITY DUE TO EXCESS CALORIES AFFECTING PREGNANCY IN THIRD TRIMESTER (HCC): ICD-10-CM

## 2025-04-18 DIAGNOSIS — L29.9 ITCHING: ICD-10-CM

## 2025-04-18 DIAGNOSIS — L29.9 ITCHING: Primary | ICD-10-CM

## 2025-04-18 DIAGNOSIS — Z3A.35 35 WEEKS GESTATION OF PREGNANCY: Primary | ICD-10-CM

## 2025-04-18 DIAGNOSIS — E66.01 SEVERE OBESITY DUE TO EXCESS CALORIES AFFECTING PREGNANCY IN THIRD TRIMESTER (HCC): ICD-10-CM

## 2025-04-18 LAB
ALBUMIN SERPL BCG-MCNC: 3.6 G/DL (ref 3.5–5)
ALP SERPL-CCNC: 76 U/L (ref 34–104)
ALT SERPL W P-5'-P-CCNC: 17 U/L (ref 7–52)
ANION GAP SERPL CALCULATED.3IONS-SCNC: 7 MMOL/L (ref 4–13)
AST SERPL W P-5'-P-CCNC: 18 U/L (ref 13–39)
BILIRUB SERPL-MCNC: 0.21 MG/DL (ref 0.2–1)
BUN SERPL-MCNC: 11 MG/DL (ref 5–25)
CALCIUM SERPL-MCNC: 9.1 MG/DL (ref 8.4–10.2)
CHLORIDE SERPL-SCNC: 104 MMOL/L (ref 96–108)
CO2 SERPL-SCNC: 23 MMOL/L (ref 21–32)
CREAT SERPL-MCNC: 0.43 MG/DL (ref 0.6–1.3)
DME PARACHUTE DELIVERY DATE REQUESTED: NORMAL
DME PARACHUTE ITEM DESCRIPTION: NORMAL
DME PARACHUTE ORDER STATUS: NORMAL
DME PARACHUTE SUPPLIER NAME: NORMAL
DME PARACHUTE SUPPLIER PHONE: NORMAL
GFR SERPL CREATININE-BSD FRML MDRD: 133 ML/MIN/1.73SQ M
GLUCOSE SERPL-MCNC: 96 MG/DL (ref 65–140)
POTASSIUM SERPL-SCNC: 3.8 MMOL/L (ref 3.5–5.3)
PROT SERPL-MCNC: 6.9 G/DL (ref 6.4–8.4)
SODIUM SERPL-SCNC: 134 MMOL/L (ref 135–147)

## 2025-04-18 PROCEDURE — 76815 OB US LIMITED FETUS(S): CPT

## 2025-04-18 PROCEDURE — 59025 FETAL NON-STRESS TEST: CPT

## 2025-04-18 PROCEDURE — 36415 COLL VENOUS BLD VENIPUNCTURE: CPT

## 2025-04-18 PROCEDURE — 80053 COMPREHEN METABOLIC PANEL: CPT

## 2025-04-18 PROCEDURE — 82240 BILE ACIDS CHOLYLGLYCINE: CPT

## 2025-04-18 PROCEDURE — 99214 OFFICE O/P EST MOD 30 MIN: CPT

## 2025-04-18 NOTE — TELEPHONE ENCOUNTER
"FOLLOW UP: ESC to on call Dr. Raphael- will order labs  Patient has NST/ ZACHARY @ [facility]   REASON FOR CONVERSATION: Pregnancy Problem    SYMPTOMS: severe hand itching worried about cholestasis. Itching started 2 weeks ago but severe last night, used hydro cream, no change.     OTHER: 35 weeks 6 days, EDC 5/17/25. Baby normally only moves a night, she felt good movement last night, felt 1 kick so far today, just woke up, has not eaten. Advised eat protein, hydrate and check kick count now, call back if baby not meeting. Patient verbalzied understanding.no lof, no bleeding, no contractions. No n/v/d, no fever.     DISPOSITION: Discuss with Provider and Call Back Patient  Answer Assessment - Initial Assessment Questions  1. DESCRIPTION: \"Describe the itching you are having.\"      Itching on top of hand, had itching on feet and ankles earlier in pregnancy. Tips of fingers yesterday. Cracking skin, no hives or bumps, skin is not red but irritated from scratching.    2. LOCATION: \"Where is the itching located?\" (abdomen, feet, hands, or everywhere equally)      Top of hands only tried lotion   3. SEVERITY: \"How bad is it?\"       Severe   4. SCRATCHING: \"Are there any scratch marks? Bleeding?\"      Denies   5. ONSET: \"When did this begin?\"       Per patient 2 weeks ago c/o at OB appointment   6. CAUSE: \"What do you think is causing the itching?\" (ask about swimming pools, pollen, animals, soaps, etc.)      Not sure   7. OTHER SYMPTOMS: \"Do you have any other symptoms?\" (abdominal pain, dark urine, fever, light colored stool, poor appetite, rash, or weight loss)      No n/v/d. No pelvic cramping, no bleeding, no lof   8. PREGNANCY: \"How many weeks pregnant are you?\" \"Are you being monitored for a high-risk pregnancy?\"      35 weeks 6 days, baby was active last night but not much movement now  9. AKILAH: \"What date are you expecting to deliver?\"      5/17/25    Protocols used: Pregnancy - Itching-Adult-AH    "

## 2025-04-18 NOTE — TELEPHONE ENCOUNTER
[id number]- phone call back to patient and we reviewed providers lab orders. Patient verbalzied understanding.   Patient states baby moving now.   Patient thankful for follow up, aware to call with any questions or concerns.

## 2025-04-18 NOTE — PROGRESS NOTES
"[facility]: Ms. Levy was seen today at 35w6d gestational age for NST (found under the pregnancy episode) which I reviewed the RN assessment and agree, and ZACHARY (see ultrasound report under OB procedures tab).  See ultrasound report under \"OB Procedures\" tab.      I spent 20 minutes devoted to patient care (5 min chart preparation, 10 minutes face to face and 5 minutes documenting).      Non-Stress Testing:    Non-Stress test, equipment, procedure, and expected outcomes explained. Reviewed fetal kick counts and when to call OB.Verified patient understanding of fetal kick counts with teach back method. Patient reports feeling daily fetal movements. Non-stress reviewed and interpreted by me in-person.    Denies: vaginal bleeding, leakage of fluid, or current contractions- but was having intermittent, irregular contractions over the course of the last week.   Endorses: positive fetal movement     Teresa MATHIAS  "

## 2025-04-18 NOTE — TELEPHONE ENCOUNTER
Regardinw6d concerns for cholesasis.  ----- Message from Mimi RAMOS sent at 2025  7:47 AM EDT -----  Pt. Sent a myc message that she is having itching on her feet, ankles and hands. She tried lotion and it is not helping. She is concerned for cholestasis.

## 2025-04-20 ENCOUNTER — APPOINTMENT (EMERGENCY)
Dept: RADIOLOGY | Facility: HOSPITAL | Age: 35
End: 2025-04-20
Payer: COMMERCIAL

## 2025-04-20 ENCOUNTER — HOSPITAL ENCOUNTER (OUTPATIENT)
Facility: HOSPITAL | Age: 35
Setting detail: OBSERVATION
Discharge: HOME | End: 2025-04-21
Attending: EMERGENCY MEDICINE | Admitting: OBSTETRICS & GYNECOLOGY
Payer: COMMERCIAL

## 2025-04-20 ENCOUNTER — APPOINTMENT (EMERGENCY)
Dept: CT IMAGING | Facility: HOSPITAL | Age: 35
End: 2025-04-20
Payer: COMMERCIAL

## 2025-04-20 DIAGNOSIS — R05.9 COUGH: ICD-10-CM

## 2025-04-20 DIAGNOSIS — R09.89 SUSPECTED PULMONARY HYPERTENSION: ICD-10-CM

## 2025-04-20 DIAGNOSIS — R07.9 CHEST PAIN: Primary | ICD-10-CM

## 2025-04-20 DIAGNOSIS — O36.8190 DECREASED FETAL MOVEMENTS: ICD-10-CM

## 2025-04-20 DIAGNOSIS — R06.02 SOB (SHORTNESS OF BREATH): ICD-10-CM

## 2025-04-20 LAB
ALBUMIN SERPL BCG-MCNC: 3.5 G/DL (ref 3.5–5)
ALP SERPL-CCNC: 81 U/L (ref 34–104)
ALT SERPL W P-5'-P-CCNC: 15 U/L (ref 7–52)
ANION GAP SERPL CALCULATED.3IONS-SCNC: 9 MMOL/L (ref 4–13)
AST SERPL W P-5'-P-CCNC: 18 U/L (ref 13–39)
BASOPHILS # BLD AUTO: 0.05 THOUSANDS/ÂΜL (ref 0–0.1)
BASOPHILS NFR BLD AUTO: 1 % (ref 0–1)
BILIRUB SERPL-MCNC: 0.24 MG/DL (ref 0.2–1)
BNP SERPL-MCNC: 8 PG/ML (ref 0–100)
BUN SERPL-MCNC: 10 MG/DL (ref 5–25)
CALCIUM SERPL-MCNC: 8.6 MG/DL (ref 8.4–10.2)
CARDIAC TROPONIN I PNL SERPL HS: 3 NG/L (ref ?–50)
CHLORIDE SERPL-SCNC: 104 MMOL/L (ref 96–108)
CK SERPL-CCNC: 107 U/L (ref 26–192)
CO2 SERPL-SCNC: 23 MMOL/L (ref 21–32)
CREAT SERPL-MCNC: 0.53 MG/DL (ref 0.6–1.3)
D DIMER PPP FEU-MCNC: 1.46 UG/ML FEU
EOSINOPHIL # BLD AUTO: 0.35 THOUSAND/ÂΜL (ref 0–0.61)
EOSINOPHIL NFR BLD AUTO: 5 % (ref 0–6)
ERYTHROCYTE [DISTWIDTH] IN BLOOD BY AUTOMATED COUNT: 14.6 % (ref 11.6–15.1)
FLUAV AG UPPER RESP QL IA.RAPID: NEGATIVE
FLUBV AG UPPER RESP QL IA.RAPID: NEGATIVE
GFR SERPL CREATININE-BSD FRML MDRD: 124 ML/MIN/1.73SQ M
GLUCOSE SERPL-MCNC: 92 MG/DL (ref 65–140)
HCT VFR BLD AUTO: 39.2 % (ref 34.8–46.1)
HGB BLD-MCNC: 12.7 G/DL (ref 11.5–15.4)
IMM GRANULOCYTES # BLD AUTO: 0.1 THOUSAND/UL (ref 0–0.2)
IMM GRANULOCYTES NFR BLD AUTO: 1 % (ref 0–2)
LYMPHOCYTES # BLD AUTO: 1.64 THOUSANDS/ÂΜL (ref 0.6–4.47)
LYMPHOCYTES NFR BLD AUTO: 22 % (ref 14–44)
MAGNESIUM SERPL-MCNC: 1.8 MG/DL (ref 1.9–2.7)
MCH RBC QN AUTO: 26.2 PG (ref 26.8–34.3)
MCHC RBC AUTO-ENTMCNC: 32.4 G/DL (ref 31.4–37.4)
MCV RBC AUTO: 81 FL (ref 82–98)
MONOCYTES # BLD AUTO: 0.74 THOUSAND/ÂΜL (ref 0.17–1.22)
MONOCYTES NFR BLD AUTO: 10 % (ref 4–12)
NEUTROPHILS # BLD AUTO: 4.73 THOUSANDS/ÂΜL (ref 1.85–7.62)
NEUTS SEG NFR BLD AUTO: 61 % (ref 43–75)
NRBC BLD AUTO-RTO: 0 /100 WBCS
PLATELET # BLD AUTO: 252 THOUSANDS/UL (ref 149–390)
PMV BLD AUTO: 8.8 FL (ref 8.9–12.7)
POTASSIUM SERPL-SCNC: 3.7 MMOL/L (ref 3.5–5.3)
PROT SERPL-MCNC: 7.1 G/DL (ref 6.4–8.4)
RBC # BLD AUTO: 4.85 MILLION/UL (ref 3.81–5.12)
SARS-COV+SARS-COV-2 AG RESP QL IA.RAPID: NEGATIVE
SODIUM SERPL-SCNC: 136 MMOL/L (ref 135–147)
WBC # BLD AUTO: 7.61 THOUSAND/UL (ref 4.31–10.16)

## 2025-04-20 PROCEDURE — 83735 ASSAY OF MAGNESIUM: CPT

## 2025-04-20 PROCEDURE — 71275 CT ANGIOGRAPHY CHEST: CPT

## 2025-04-20 PROCEDURE — 80053 COMPREHEN METABOLIC PANEL: CPT

## 2025-04-20 PROCEDURE — 85379 FIBRIN DEGRADATION QUANT: CPT

## 2025-04-20 PROCEDURE — 71046 X-RAY EXAM CHEST 2 VIEWS: CPT

## 2025-04-20 PROCEDURE — 87811 SARS-COV-2 COVID19 W/OPTIC: CPT

## 2025-04-20 PROCEDURE — 87804 INFLUENZA ASSAY W/OPTIC: CPT

## 2025-04-20 PROCEDURE — 99285 EMERGENCY DEPT VISIT HI MDM: CPT

## 2025-04-20 PROCEDURE — 85025 COMPLETE CBC W/AUTO DIFF WBC: CPT

## 2025-04-20 PROCEDURE — 82550 ASSAY OF CK (CPK): CPT

## 2025-04-20 PROCEDURE — 36415 COLL VENOUS BLD VENIPUNCTURE: CPT

## 2025-04-20 PROCEDURE — 93005 ELECTROCARDIOGRAM TRACING: CPT

## 2025-04-20 PROCEDURE — 84484 ASSAY OF TROPONIN QUANT: CPT

## 2025-04-20 PROCEDURE — 83880 ASSAY OF NATRIURETIC PEPTIDE: CPT

## 2025-04-20 RX ADMIN — SODIUM CHLORIDE 250 ML: 0.9 INJECTION, SOLUTION INTRAVENOUS at 21:21

## 2025-04-20 RX ADMIN — IOHEXOL 74 ML: 350 INJECTION, SOLUTION INTRAVENOUS at 23:42

## 2025-04-21 ENCOUNTER — APPOINTMENT (OUTPATIENT)
Dept: NON INVASIVE DIAGNOSTICS | Facility: HOSPITAL | Age: 35
End: 2025-04-21
Payer: COMMERCIAL

## 2025-04-21 ENCOUNTER — RESULTS FOLLOW-UP (OUTPATIENT)
Dept: OBGYN CLINIC | Facility: CLINIC | Age: 35
End: 2025-04-21

## 2025-04-21 VITALS
DIASTOLIC BLOOD PRESSURE: 78 MMHG | HEART RATE: 94 BPM | SYSTOLIC BLOOD PRESSURE: 147 MMHG | WEIGHT: 271.17 LBS | TEMPERATURE: 97.6 F | RESPIRATION RATE: 18 BRPM | OXYGEN SATURATION: 97 % | BODY MASS INDEX: 45.18 KG/M2 | HEIGHT: 65 IN

## 2025-04-21 PROBLEM — R06.02 SHORTNESS OF BREATH: Status: ACTIVE | Noted: 2025-04-21

## 2025-04-21 PROBLEM — L29.9 ITCHING: Status: ACTIVE | Noted: 2025-04-21

## 2025-04-21 PROBLEM — Z34.83 PRENATAL CARE, SUBSEQUENT PREGNANCY, THIRD TRIMESTER: Status: ACTIVE | Noted: 2025-04-21

## 2025-04-21 LAB
2HR DELTA HS TROPONIN: <-1 NG/L
ABO GROUP BLD: NORMAL
AORTIC ROOT: 2.5 CM
ASCENDING AORTA: 2.9 CM
BLD GP AB SCN SERPL QL: NEGATIVE
BSA FOR ECHO PROCEDURE: 2.25 M2
CARDIAC TROPONIN I PNL SERPL HS: <2 NG/L (ref ?–50)
E WAVE DECELERATION TIME: 196 MS
E/A RATIO: 1.4
FRACTIONAL SHORTENING: 30 (ref 28–44)
GLUCOSE SERPL-MCNC: 108 MG/DL (ref 65–140)
GLUCOSE SERPL-MCNC: 71 MG/DL (ref 65–140)
GLUCOSE SERPL-MCNC: 98 MG/DL (ref 65–140)
INTERVENTRICULAR SEPTUM IN DIASTOLE (PARASTERNAL SHORT AXIS VIEW): 1.2 CM
INTERVENTRICULAR SEPTUM: 1.2 CM (ref 0.6–1.1)
LAAS-AP2: 20.8 CM2
LAAS-AP4: 19.4 CM2
LEFT ATRIUM SIZE: 4.4 CM
LEFT ATRIUM VOLUME (MOD BIPLANE): 52 ML
LEFT ATRIUM VOLUME INDEX (MOD BIPLANE): 23.1 ML/M2
LEFT INTERNAL DIMENSION IN SYSTOLE: 3.2 CM (ref 2.1–4)
LEFT VENTRICULAR INTERNAL DIMENSION IN DIASTOLE: 4.6 CM (ref 3.5–6)
LEFT VENTRICULAR POSTERIOR WALL IN END DIASTOLE: 1.1 CM
LEFT VENTRICULAR STROKE VOLUME: 56 ML
LV EF US.2D.A4C+ESTIMATED: 65 %
LVSV (TEICH): 56 ML
MV E'TISSUE VEL-LAT: 13 CM/S
MV E'TISSUE VEL-SEP: 14 CM/S
MV PEAK A VEL: 0.5 M/S
MV PEAK E VEL: 70 CM/S
MV STENOSIS PRESSURE HALF TIME: 57 MS
MV VALVE AREA P 1/2 METHOD: 3.86
RH BLD: POSITIVE
RIGHT ATRIUM AREA SYSTOLE A4C: 17.2 CM2
RIGHT VENTRICLE ID DIMENSION: 3.3 CM
SL CV LEFT ATRIUM LENGTH A2C: 6.4 CM
SL CV LV EF: 65
SL CV PED ECHO LEFT VENTRICLE DIASTOLIC VOLUME (MOD BIPLANE) 2D: 97 ML
SL CV PED ECHO LEFT VENTRICLE SYSTOLIC VOLUME (MOD BIPLANE) 2D: 41 ML
SPECIMEN EXPIRATION DATE: NORMAL
TR MAX PG: 19 MMHG
TR PEAK VELOCITY: 2.2 M/S
TRICUSPID ANNULAR PLANE SYSTOLIC EXCURSION: 2.6 CM
TRICUSPID VALVE PEAK REGURGITATION VELOCITY: 2.17 M/S

## 2025-04-21 PROCEDURE — 59025 FETAL NON-STRESS TEST: CPT

## 2025-04-21 PROCEDURE — 86900 BLOOD TYPING SEROLOGIC ABO: CPT

## 2025-04-21 PROCEDURE — 93306 TTE W/DOPPLER COMPLETE: CPT | Performed by: STUDENT IN AN ORGANIZED HEALTH CARE EDUCATION/TRAINING PROGRAM

## 2025-04-21 PROCEDURE — 82948 REAGENT STRIP/BLOOD GLUCOSE: CPT

## 2025-04-21 PROCEDURE — 86901 BLOOD TYPING SEROLOGIC RH(D): CPT

## 2025-04-21 PROCEDURE — 99214 OFFICE O/P EST MOD 30 MIN: CPT

## 2025-04-21 PROCEDURE — 86850 RBC ANTIBODY SCREEN: CPT

## 2025-04-21 PROCEDURE — NC001 PR NO CHARGE: Performed by: OBSTETRICS & GYNECOLOGY

## 2025-04-21 PROCEDURE — 76815 OB US LIMITED FETUS(S): CPT

## 2025-04-21 PROCEDURE — 93306 TTE W/DOPPLER COMPLETE: CPT

## 2025-04-21 RX ORDER — ONDANSETRON 2 MG/ML
4 INJECTION INTRAMUSCULAR; INTRAVENOUS EVERY 8 HOURS PRN
Status: DISCONTINUED | OUTPATIENT
Start: 2025-04-21 | End: 2025-04-21 | Stop reason: HOSPADM

## 2025-04-21 RX ORDER — BENZOCAINE/MENTHOL 6 MG-10 MG
LOZENGE MUCOUS MEMBRANE 4 TIMES DAILY PRN
Status: DISCONTINUED | OUTPATIENT
Start: 2025-04-21 | End: 2025-04-21 | Stop reason: HOSPADM

## 2025-04-21 RX ORDER — DIPHENHYDRAMINE HYDROCHLORIDE 50 MG/ML
25 INJECTION, SOLUTION INTRAMUSCULAR; INTRAVENOUS EVERY 6 HOURS PRN
Status: DISCONTINUED | OUTPATIENT
Start: 2025-04-21 | End: 2025-04-21 | Stop reason: HOSPADM

## 2025-04-21 RX ORDER — LORATADINE 10 MG/1
10 TABLET ORAL DAILY
Qty: 30 TABLET | Refills: 0 | Status: SHIPPED | OUTPATIENT
Start: 2025-04-21 | End: 2025-05-04

## 2025-04-21 RX ORDER — BENZONATATE 100 MG/1
100 CAPSULE ORAL 3 TIMES DAILY PRN
Qty: 20 CAPSULE | Refills: 0 | Status: SHIPPED | OUTPATIENT
Start: 2025-04-21 | End: 2025-05-04

## 2025-04-21 RX ORDER — ACETAMINOPHEN 325 MG/1
975 TABLET ORAL EVERY 8 HOURS PRN
Start: 2025-04-21 | End: 2025-05-04

## 2025-04-21 RX ORDER — ACETAMINOPHEN 325 MG/1
975 TABLET ORAL EVERY 8 HOURS PRN
Status: DISCONTINUED | OUTPATIENT
Start: 2025-04-21 | End: 2025-04-21 | Stop reason: HOSPADM

## 2025-04-21 RX ORDER — CALCIUM CARBONATE 500 MG/1
1000 TABLET, CHEWABLE ORAL 3 TIMES DAILY PRN
Status: DISCONTINUED | OUTPATIENT
Start: 2025-04-21 | End: 2025-04-21 | Stop reason: HOSPADM

## 2025-04-21 RX ORDER — BENZONATATE 100 MG/1
100 CAPSULE ORAL 3 TIMES DAILY PRN
Status: DISCONTINUED | OUTPATIENT
Start: 2025-04-21 | End: 2025-04-21 | Stop reason: HOSPADM

## 2025-04-21 RX ADMIN — BENZONATATE 100 MG: 100 CAPSULE ORAL at 14:00

## 2025-04-21 RX ADMIN — HYDROCORTISONE: 1 CREAM TOPICAL at 12:30

## 2025-04-21 RX ADMIN — HYDROCORTISONE: 1 CREAM TOPICAL at 05:47

## 2025-04-21 NOTE — ASSESSMENT & PLAN NOTE
Patient endorses itching of her palms and soles,excoriations present but no rash  Bile acids drawn 4/18, followup results

## 2025-04-21 NOTE — ED ATTENDING ATTESTATION
4/20/2025  I, Cam Thompson MD, saw and evaluated the patient. I have discussed the patient with the resident/non-physician practitioner and agree with the resident's/non-physician practitioner's findings, Plan of Care, and MDM as documented in the resident's/non-physician practitioner's note, except where noted. All available labs and Radiology studies were reviewed.  I was present for key portions of any procedure(s) performed by the resident/non-physician practitioner and I was immediately available to provide assistance.       At this point I agree with the current assessment done in the Emergency Department.  I have conducted an independent evaluation of this patient a history and physical is as follows:    History    Patient is a 35-year-old female, with a history significant for migraine and current pregnancy at 36 weeks 1 day prior medical record, who presents to the ED today for evaluation of multiple complaints.  Primarily, patient states that 4 days ago she developed cough and congestion, cough mildly productive.  Starting 2 days ago, she developed dyspnea as well as nonexertional, nonradiating, pleuritic, pressure in character chest pain that has been constant since its onset.  Patient also describes right upper quadrant abdominal discomfort, hand itching, bilateral muscle cramping.  There has been some concern during this pregnancy for cholestasis of pregnancy.  Patient has not taken anything to remit her symptoms.  Breathing exacerbates her discomfort.  Patient denies fever, urinary symptoms, weakness, numbness, vaginal bleeding, vaginal discharge, rash, history of VTE, recent trauma or surgery, hemoptysis.  Patient's , present in room providing collateral history, states patient is not confused.    Patient is without other concerns at this time.     ROS  Patient denies: Fever; dysphagia; vision change; polyuria; dysuria; rash; weakness; difficulty walking; confusion    Physical  Exam    GENERAL APPEARANCE: NAD  NEURO: Patient is speaking clearly in complete sentences.  Patient is answering appropriately and able follow commands.  Patient is moving all four extremities spontaneously.  No facial droop.  Tongue midline.  HEENT: PERRL, Moist mucous membranes, external ears normal, nose normal  Neck: No cervical adenopathy  CV: Tachycardic rate, RR. No murmurs, rubs, gallops  LUNGS: Clear to auscultation: No wheezes, stridor, rhonchi, rales  GI: Gravid abdomen.  No CVA tenderness bilaterally.  Tenderness to palpation in the right upper quadrant with neither rebound or guarding.  No tenderness to palpation throughout the right flank or right lower quadrant.  : Deferred at this time  MSK: No deformity.  No lower extremity edema.  No pain with calf squeeze.  Skin: Warm and dry  Capillary refill: <2 seconds        Patient is currently afebrile, tachycardic, otherwise stable.    Assessment/Plan/MDM  Chest pain, dyspnea, productive cough, abdominal pain, hand itching, cramping  -This presentation is concerning for: ACS, anemia, physiologic changes of pregnancy, cholestasis of pregnancy, VTE, pneumothorax.  Patient at risk for pneumonia/UTI.  Overall low suspicion for appendicitis based on history/physical exam  - Will investigate with cardiac workup, D-dimer, viral testing  - Will manage with fluids and further based upon workup as patient declines analgesia at this time  - Will discuss with OB/GYN given abdominal pain and reports of decreased fetal movement    ED Course  ED Course as of 04/20/25 2308   Sun Apr 20, 2025 2142 Hemoglobin: 12.7  WNL    2157 hs TnI 0hr: 3  WNL    2258 ECG per my independent interpretation: Tachycardic rate, regular rhythm, no ectopy, normal axis, no ST elevations or depressions     2302 D-Dimer, Quant(!): 1.46  Elevated, can't exclude PE with YEARS. Will image          Critical Care Time  Procedures

## 2025-04-21 NOTE — PROGRESS NOTES
"Presented to patient's room to assess chest pain. She has chest pain while coughing. This cough is not new and a part of the reason she presented to the hospital. She has no chest pain at rest. Reports mild chest pressure when laying down on her back that quickly resolves when she sits up. Echo was unremarkable. Discussed trying tessalon perles to manage cough which she is agreeable to.     She reports pelvic pressure which is not new for her. SVE 1/0/-2. NST was reactive this morning.    Reports continued palm burning. Bile acids pending.    Objective:  Vitals:    04/21/25 0510 04/21/25 0805 04/21/25 0900 04/21/25 1219   BP: 116/68 113/66 113/66 133/69   BP Location: Right arm Right arm  Right arm   Pulse: 94 85 85 89   Resp: 18 16  18   Temp: 97.5 °F (36.4 °C) 97.6 °F (36.4 °C)  97.5 °F (36.4 °C)   TempSrc: Oral Oral  Oral   SpO2: 99% 97%  96%   Weight:   123 kg (271 lb 2.7 oz)    Height:   5' 5\" (1.651 m)       Cardiac: regular rate and rhythm, no murmurs  Pulmonary: lungs clear to auscultation bilaterally   Genitourinary: NEFG, SVE 1/0/-2      Plan for tessalon perles.    Discussed with Dr. Fantasma Garner MD   PGY-2, OBGYN  4/21/2025  1:09 PM    "

## 2025-04-21 NOTE — QUICK NOTE
Araceli feels improvement after tessalon perles. Reports mild congestion. She would would like to go home.    Discussed discharge with tessalon perles and claritin which she agrees to.     Patient instructed to call if experiencing worsening contractions, vaginal bleeding, loss of fluid or decreased fetal movement.  Will follow up with OBGYN tomorrow.    D/w Dr. Fantasma Josue.    Denisa Garner MD   PGY-2, OBGYN  4/21/2025  4:43 PM

## 2025-04-21 NOTE — ED PROVIDER NOTES
Time reflects when diagnosis was documented in both MDM as applicable and the Disposition within this note       Time User Action Codes Description Comment    4/21/2025  1:00 AM Ximena Nava Add [R07.9] Chest pain     4/21/2025  1:00 AM AwTabby renoopefoluwa Add [R06.02] SOB (shortness of breath)     4/21/2025  1:00 AM AwTabby renoopefoluwa Add [O36.8190] Decreased fetal movements     4/21/2025  1:00 AM Jeni Navafokelly Add [R09.89] Suspected pulmonary hypertension     4/21/2025  4:07 PM Denisa Garner Add [R05.9] Cough           ED Disposition       ED Disposition   Send to L&D After Provider Eval    Condition   --    Date/Time   Mon Apr 21, 2025 12:59 AM    Comment   --             Assessment & Plan       Medical Decision Making  35-year-old female presents for evaluation of chest pain, shortness of breath.  36 weeks pregnant, in no acute distress, resting comfortably in bed.  Vital signs within normal limits.  Differentials include but not limited to cholestasis of pregnancy, pulmonary embolism, flu/COVID/RSV, other viral syndrome, pneumonia.  Will obtain labs, imaging studies and treat symptomatically.    See ED course for further MDM.    Amount and/or Complexity of Data Reviewed  Labs: ordered. Decision-making details documented in ED Course.  Radiology: ordered and independent interpretation performed. Decision-making details documented in ED Course.  ECG/medicine tests:  Decision-making details documented in ED Course.    Risk  Prescription drug management.        ED Course as of 04/22/25 0311   Sun Apr 20, 2025   2221 MAGNESIUM(!): 1.8   2221 D-Dimer, Quant(!): 1.46  Within normal limits for 3rd trimester of pregnancy   2222 BNP: 8   2222 FLU/COVID Rapid Antigen (30 min. TAT) - Preferred screening test in ED  All negative   2222 hs TnI 0hr: 3   2224 Comprehensive metabolic panel(!)  Within normal limits, specifically no elevated liver enzymes or bilirubin     2315 Given elevated D-dimer, cannot  rule out PE with US criteria.  Will discuss risk benefits with patient, and order CT PE study if patient is amenable.   Mon Apr 21, 2025   0047 CTA chest pe study  IMPRESSION:     No intraluminal filling defect to suggest an acute pulmonary embolus.     Clear lungs.     Mild enlargement of the central pulmonary artery arterial tree which can be associated with pulmonary hypertension.       0108 Given results of CT scan, will have patient admitted for cardiology evaluation.  Discussed with OB/GYN team, and it was agreed the patient will be admitted to the OB/GYN service with cardiology consulting, as patient is well advanced in her pregnancy.  Patient is amenable with this plan.  She will be sent upstairs to OB triage and then admitted.   0109 ECG 12 lead  ED interpretation: NSR, 77 bpm.  CO interval 160 ms, QRS 78 ms, QTc 452 ms.  No ST elevations or depressions noted.  Normal axis.  No significant changes noted from previous EKG of 8/23/2021.       Medications   sodium chloride 0.9 % bolus 250 mL (0 mL Intravenous Stopped 4/20/25 2151)   iohexol (OMNIPAQUE) 350 MG/ML injection (MULTI-DOSE) 74 mL (74 mL Intravenous Given 4/20/25 2342)       ED Risk Strat Scores   HEART Risk Score      Flowsheet Row Most Recent Value   Heart Score Risk Calculator    History 1 Filed at: 04/20/2025 2302   ECG 0 Filed at: 04/20/2025 2302   Age 0 Filed at: 04/20/2025 2302   Risk Factors 1 Filed at: 04/20/2025 2302   Troponin 0 Filed at: 04/20/2025 2302   HEART Score 2 Filed at: 04/20/2025 2302          HEART Risk Score      Flowsheet Row Most Recent Value   Heart Score Risk Calculator    History 1 Filed at: 04/20/2025 2302   ECG 0 Filed at: 04/20/2025 2302   Age 0 Filed at: 04/20/2025 2302   Risk Factors 1 Filed at: 04/20/2025 2302   Troponin 0 Filed at: 04/20/2025 2302   HEART Score 2 Filed at: 04/20/2025 2302                      No data recorded        SBIRT 22yo+      Flowsheet Row Most Recent Value   Initial Alcohol Screen: US  AUDIT-C     1. How often do you have a drink containing alcohol? 0 Filed at: 04/20/2025 2026   2. How many drinks containing alcohol do you have on a typical day you are drinking?  0 Filed at: 04/20/2025 2026   3b. FEMALE Any Age, or MALE 65+: How often do you have 4 or more drinks on one occassion? 0 Filed at: 04/20/2025 2026   Audit-C Score 0 Filed at: 04/20/2025 2026                            History of Present Illness       Chief Complaint   Patient presents with    Shortness of Breath     Pt c/o cold for approx a week that has progressed to sob with chest pain. Pt is 36w1d pregnant  and concerned for decreased fetal movement the last 2 days. OB told pt to come in for concerns. Pt also complaining of hands itching for approx one month. Pt also complaining of muscle cramping intermittently.     Decreased Fetal Movement       Past Medical History:   Diagnosis Date    Migraine       Past Surgical History:   Procedure Laterality Date    OTHER SURGICAL HISTORY Right     Arm Fractured bone at 6 y.o      Family History   Problem Relation Age of Onset    Thyroid disease Mother     Other Mother         Tachycardia    Asthma Mother     Heart attack Father         Pacemaker    Diabetes Father         T2DM    No Known Problems Sister     Other Sister         Tachycardia due to anxiety    No Known Problems Sister     Diabetes Maternal Grandmother     Stroke Maternal Grandmother     No Known Problems Paternal Grandmother     Diabetes Paternal Grandfather     Asthma Sister     Asthma Sister     Breast cancer Neg Hx     Colon cancer Neg Hx     Ovarian cancer Neg Hx       Social History     Tobacco Use    Smoking status: Never    Smokeless tobacco: Never   Vaping Use    Vaping status: Never Used   Substance Use Topics    Alcohol use: Never    Drug use: Never      E-Cigarette/Vaping    E-Cigarette Use Never User       E-Cigarette/Vaping Substances    Nicotine No     THC No     CBD No     Flavoring No       I have reviewed and  agree with the history as documented.     36 yo female presents for evaluation of SOB and chest pain.  Reports 4 days of URI symptoms (cough, congestion), and 2 days of dyspnea, chest pain, pleuritic chest pain. She also reports itching in her palms, right upper quadrant abdominal pain, but no lower extremity cramping and decreased fetal movements.  Chest pain is worse with taking deep breaths, and coughing.  She was told by her OB to come in for evaluation due to concern for cholestasis of pregnancy.  Denies abdominal pain, headache, fevers.          Review of Systems   Constitutional:  Negative for fever.   Respiratory:  Positive for cough, chest tightness and shortness of breath.    Cardiovascular:  Positive for chest pain.   Musculoskeletal:  Positive for myalgias.           Objective       ED Triage Vitals   Temperature Pulse Blood Pressure Respirations SpO2 Patient Position - Orthostatic VS   04/20/25 2020 04/20/25 2020 04/20/25 2020 04/20/25 2020 04/20/25 2020 04/21/25 0159   97.9 °F (36.6 °C) 104 133/81 16 98 % Lying      Temp Source Heart Rate Source BP Location FiO2 (%) Pain Score    04/20/25 2020 04/20/25 2020 04/20/25 2020 -- 04/20/25 2020    Oral Monitor Right arm  6      Vitals      Date and Time Temp Pulse SpO2 Resp BP Pain Score FACES Pain Rating User   04/21/25 1606 97.6 °F (36.4 °C) 94 97 % 18 147/78 4 -- JW   04/21/25 1219 97.5 °F (36.4 °C) 89 96 % 18 133/69 6 -- MV   04/21/25 0900 -- 85 -- -- 113/66 -- -- IT   04/21/25 0805 -- -- -- -- -- No Pain -- SKB   04/21/25 0805 97.6 °F (36.4 °C) 85 97 % 16 113/66 -- -- MV   04/21/25 0510 97.5 °F (36.4 °C) 94 99 % 18 116/68 -- -- TA   04/21/25 0159 -- 90 -- 18 121/65 -- -- JM   04/21/25 0121 -- 96 99 % -- -- -- -- JM   04/21/25 0120 -- 93 99 % -- -- -- --    04/21/25 0119 -- 96 99 % -- -- -- --    04/21/25 0118 -- 95 98 % -- -- -- --    04/21/25 0117 -- 97 98 % -- -- -- --    04/21/25 0116 -- 93 98 % -- -- -- --    04/21/25 0115 -- 97 98 % -- --  -- --    04/20/25 2020 97.9 °F (36.6 °C) 104 98 % 16 133/81 6 -- MM            Physical Exam  Vitals and nursing note reviewed.   Constitutional:       General: She is not in acute distress.     Appearance: She is well-developed.   HENT:      Head: Normocephalic and atraumatic.   Eyes:      Conjunctiva/sclera: Conjunctivae normal.   Cardiovascular:      Rate and Rhythm: Normal rate and regular rhythm.      Heart sounds: No murmur heard.  Pulmonary:      Effort: Pulmonary effort is normal. No respiratory distress.   Abdominal:      Palpations: Abdomen is soft.      Tenderness: There is no abdominal tenderness.      Comments: Appropriately gravid uterus.   bpm.   Musculoskeletal:         General: No swelling.   Skin:     General: Skin is warm and dry.      Capillary Refill: Capillary refill takes less than 2 seconds.   Neurological:      Mental Status: She is alert.   Psychiatric:         Mood and Affect: Mood normal.         Results Reviewed       Procedure Component Value Units Date/Time    HS Troponin I 2hr [[id number]]  (Normal) Collected: 04/20/25 2320    Lab Status: Final result Specimen: Blood from Arm, Left Updated: 04/21/25 0000     hs TnI 2hr <2 ng/L      Delta 2hr hsTnI <-1 ng/L     HS Troponin 0hr (reflex protocol) [[id number]]  (Normal) Collected: 04/20/25 2117    Lab Status: Final result Specimen: Blood from Arm, Left Updated: 04/20/25 2153     hs TnI 0hr 3 ng/L     B-Type Natriuretic Peptide(BNP) [[id number]]  (Normal) Collected: 04/20/25 2117    Lab Status: Final result Specimen: Blood from Arm, Left Updated: 04/20/25 2152     BNP 8 pg/mL     Comprehensive metabolic panel [[id number]]  (Abnormal) Collected: 04/20/25 2117    Lab Status: Final result Specimen: Blood from Arm, Left Updated: 04/20/25 2149     Sodium 136 mmol/L      Potassium 3.7 mmol/L      Chloride 104 mmol/L      CO2 23 mmol/L      ANION GAP 9 mmol/L      BUN 10 mg/dL      Creatinine 0.53 mg/dL      Glucose 92 mg/dL      Calcium 8.6  mg/dL      AST 18 U/L      ALT 15 U/L      Alkaline Phosphatase 81 U/L      Total Protein 7.1 g/dL      Albumin 3.5 g/dL      Total Bilirubin 0.24 mg/dL      eGFR 124 ml/min/1.73sq m     Narrative:      National Kid[employer] Disease Foundation guidelines for Chronic Kidney Disease (CKD):     Stage 1 with normal or high GFR (GFR > 90 mL/min/1.73 square meters)    Stage 2 Mild CKD (GFR = 60-89 mL/min/1.73 square meters)    Stage 3A Moderate CKD (GFR = 45-59 mL/min/1.73 square meters)    Stage 3B Moderate CKD (GFR = 30-44 mL/min/1.73 square meters)    Stage 4 Severe CKD (GFR = 15-29 mL/min/1.73 square meters)    Stage 5 End Stage CKD (GFR <15 mL/min/1.73 square meters)  Note: GFR calculation is accurate only with a steady state creatinine    Magnesium [[id number]]  (Abnormal) Collected: 04/20/25 2117    Lab Status: Final result Specimen: Blood from Arm, Left Updated: 04/20/25 2149     Magnesium 1.8 mg/dL     CK [[id number]]  (Normal) Collected: 04/20/25 2117    Lab Status: Final result Specimen: Blood from Arm, Left Updated: 04/20/25 2148     Total  U/L     D-dimer, quantitative [[id number]]  (Abnormal) Collected: 04/20/25 2117    Lab Status: Final result Specimen: Blood from Arm, Left Updated: 04/20/25 2147     D-Dimer, Quant 1.46 ug/ml FEU     FLU/COVID Rapid Antigen (30 min. TAT) - Preferred screening test in ED [[id number]]  (Normal) Collected: 04/20/25 2117    Lab Status: Final result Specimen: Nares from Nose Updated: 04/20/25 2145     SARS COV Rapid Antigen Negative     Influenza A Rapid Antigen Negative     Influenza B Rapid Antigen Negative    Narrative:      This test has been performed using the [employer] 2 FLU+SARS Antigen test under the Emergency Use Authorization (EUA). This test has been validated by the  and verified by the performing laboratory. The Farida uses lateral flow immunofluorescent sandwich assay to detect SARS-COV, Influenza A and Influenza B Antigen.     The [employer] Farida 2 SARS  Antigen test does not differentiate between SARS-CoV and SARS-CoV-2.     Negative results are presumptive and may be confirmed with a molecular assay, if necessary, for patient management. Negative results do not rule out SARS-CoV-2 or influenza infection and should not be used as the sole basis for treatment or patient management decisions. A negative test result may occur if the level of antigen in a sample is below the limit of detection of this test.     Positive results are indicative of the presence of viral antigens, but do not rule out bacterial infection or co-infection with other viruses.     All test results should be used as an adjunct to clinical observations and other information available to the provider.    FOR PEDIATRIC PATIENTS - copy/paste COVID Guidelines URL to browser: [email or web address]    CBC and differential [[id number]]  (Abnormal) Collected: 04/20/25 2117    Lab Status: Final result Specimen: Blood from Arm, Left Updated: 04/20/25 2133     WBC 7.61 Thousand/uL      RBC 4.85 Million/uL      Hemoglobin 12.7 g/dL      Hematocrit 39.2 %      MCV 81 fL      MCH 26.2 pg      MCHC 32.4 g/dL      RDW 14.6 %      MPV 8.8 fL      Platelets 252 Thousands/uL      nRBC 0 /100 WBCs      Segmented % 61 %      Immature Grans % 1 %      Lymphocytes % 22 %      Monocytes % 10 %      Eosinophils Relative 5 %      Basophils Relative 1 %      Absolute Neutrophils 4.73 Thousands/µL      Absolute Immature Grans 0.10 Thousand/uL      Absolute Lymphocytes 1.64 Thousands/µL      Absolute Monocytes 0.74 Thousand/µL      Eosinophils Absolute 0.35 Thousand/µL      Basophils Absolute 0.05 Thousands/µL             CTA chest pe study   Final Interpretation by Zay Connor MD (04/21 0029)      No intraluminal filling defect to suggest an acute pulmonary embolus.      Clear lungs.      Mild enlargement of the central pulmonary artery arterial tree which can be  associated with pulmonary hypertension.            Workstation performed: [id number]         XR chest 2 views   ED Interpretation by Cam Thompson MD (04/20 2110)   Per my independent interpretation: No acute cardiopulmonary process      Final Interpretation by Veronica Marshall MD (04/21 1321)   No acute cardiopulmonary disease.            Workstation performed: [id number]             Procedures    ED Medication and Procedure Management   Prior to Admission Medications   Prescriptions Last Dose Informant Patient Reported? Taking?   Blood Glucose Monitoring Suppl (Contour Next EZ) w/Device KIT 4/20/2025  No Yes   Sig: Dispense 1 kit per insurance formulary. GDM.   Contour Next Test test strip 4/20/2025  No Yes   Sig: Test 4 times a day. GDM   Microlet Lancets MISC 4/20/2025  No Yes   Sig: Use 4 a day. GDM   Prenatal Vit-Fe Fumarate-FA (PRENATAL PO) 4/20/2025 Self Yes Yes   Sig: Take by mouth      Facility-Administered Medications: None     Discharge Medication List as of 4/21/2025  4:37 PM        START taking these medications    Details   acetaminophen (TYLENOL) 325 mg tablet Take 3 tablets (975 mg total) by mouth every 8 (eight) hours as needed for mild pain, Starting Mon 4/21/2025, No Print      benzonatate (TESSALON PERLES) 100 mg capsule Take 1 capsule (100 mg total) by mouth 3 (three) times a day as needed for cough, Starting Mon 4/21/2025, Normal      loratadine (CLARITIN) 10 mg tablet Take 1 tablet (10 mg total) by mouth daily, Starting Mon 4/21/2025, Normal           CONTINUE these medications which have NOT CHANGED    Details   Blood Glucose Monitoring Suppl (Contour Next EZ) w/Device KIT Dispense 1 kit per insurance formulary. GDM., Normal      Contour Next Test test strip Test 4 times a day. GDM, Normal      Microlet Lancets MISC Use 4 a day. GDM, Normal      Prenatal Vit-Fe Fumarate-FA (PRENATAL PO) Take by mouth, Historical Med           No discharge procedures on file.  ED SEPSIS DOCUMENTATION    Time reflects when diagnosis was documented in both MDM as applicable and the Disposition within this note       Time User Action Codes Description Comment    4/21/2025  1:00 AM Ximena Nava Add [R07.9] Chest pain     4/21/2025  1:00 AM Ximena Nava Add [R06.02] SOB (shortness of breath)     4/21/2025  1:00 AM Ximena Nava Add [O36.8190] Decreased fetal movements     4/21/2025  1:00 AM Ximena Nava Add [R09.89] Suspected pulmonary hypertension     4/21/2025  4:07 PM Denisa Garner Add [R05.9] Cough                  Ximena Nava MD  04/22/25 4669

## 2025-04-21 NOTE — ASSESSMENT & PLAN NOTE
"No results found for: \"HGBA1C\"    No results for input(s): \"POCGLU\" in the last 72 hours.    Blood Sugar Average: Last 72 hrs:    Check fasting BG and two hour post-prandials   "

## 2025-04-21 NOTE — ASSESSMENT & PLAN NOTE
Patient seen in ED  Per discussion with ED provider seems very possible that patient's symptoms are secondary to URI  COVID/flu/RSV negative  Patient saturating 99% on room air  D-dimer 1.46  CT PE: No intraluminal filling defect to suggest an acute pulmonary embolus.Clear lungs.Mild enlargement of the central pulmonary artery arterial tree which can be associated with pulmonary hypertension  Due to concern for possible pulmonary hypertension, ED team expressed that they would like to admit this patient.   As patient is 36 weeks pregnant, OB would be the most appropriate service

## 2025-04-21 NOTE — PROCEDURES
Araceli Levy, a  at 36w2d with an AKILAH of 2025, by Ultrasound, was seen at [facility] for the following procedure(s): $Procedure Type: ZACHARY, NST]    Nonstress Test  Reason for NST: Decreased Fetal Movement  Variability: Moderate  Decelerations: None  Accelerations: Yes  Acoustic Stimulator: No  Baseline: 135 BPM  Uterine Irritability: No  Contractions: Not present    4 Quadrant ZACHARY  ZACHARY Q1 (cm): 2.3 cm  ZACHARY Q2 (cm): 3 cm  ZACHARY Q3 (cm): 3 cm  ZACHARY Q4 (cm): 2.6 cm  ZACHARY TOTAL (cm): 10.9 cm              Interpretation  Nonstress Test Interpretation: Reactive  Overall Impression: Reassuring        Laura Blair MD  OBGYN PGY-3  2025 4:03 AM

## 2025-04-21 NOTE — H&P
"H & P- Obstetrics   Araceli Levy 35 y.o. female MRN: [id number]  Unit/Bed#: -01 Encounter: [id number]    Assessment: 35 y.o.  at 36w2d admitted for possible pulmonary hypertension.    Plan:   Itching  Assessment & Plan  Patient endorses itching of her palms and soles,excoriations present but no rash  Bile acids drawn     Shortness of breath  Assessment & Plan  Patient seen in ED  Per discussion with ED provider seems very possible that patient's symptoms are secondary to URI  COVID/flu/RSV negative  Patient saturating 99% on room air  D-dimer 1.46  CT PE: No intraluminal filling defect to suggest an acute pulmonary embolus.Clear lungs.Mild enlargement of the central pulmonary artery arterial tree which can be associated with pulmonary hypertension  Due to concern for possible pulmonary hypertension, ED team expressed that they would like to admit this patient.   As patient is 36 weeks pregnant, OB would be the most appropriate service    36 weeks gestation of pregnancy  Assessment & Plan  Admit to OBGYN   Diabetic Diet  F/u T&S, CBC, RPR   NST  Analgesia at maternal request   Vertex by TAUS         Elevated blood pressure reading without diagnosis of hypertension  Assessment & Plan  Elevated BP in ED 25 : 147/83 (notation states it was taken over patient's coat and may not be accurate)  and 25: 162/75 (also in the ED)  CBC/ CMP wnl   Monitor BPs    Diet controlled gestational diabetes mellitus (GDM) in third trimester  Assessment & Plan  No results found for: \"HGBA1C\"    No results for input(s): \"POCGLU\" in the last 72 hours.    Blood Sugar Average: Last 72 hrs:    Check fasting BG and two hour post-prandials           Discussed case and plan w/ Dr. Sandoval      Chief Complaint: chest/pain/ shortness of breath    HPI: Araceli Levy is a 35 y.o.  with an AKILAH of 2025, by Ultrasound at 36w2d who is being admitted due to need for cardiac evaluation.  Patient initially " presented to the emergency room due to a complaint of chest pain/shortness of breath.   Evaluation was undertaken in the ED and was largely benign with the exception of a CT PE which showed concern for mild enlargement of the central pulmonary artery tree arterial tree which can be associated with pulmonary hypertension.  Following discussion with the ED provider, they expressed that they felt that the patient should be admitted in order to undergo cardiac evaluation.  As patient is 36 weeks pregnant, it was felt the patient was most appropriate for admission to OB/GYN.  Patient denies vaginal bleeding or loss of fluid.  She denies any contractions currently.  She does endorse decreased fetal movement which has been a chronic concern for her this pregnancy NST and ZACHARY were both within normal limits and reassuring.    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   Respiratory:  Negative for cough, shortness of breath and wheezing.    Cardiovascular:  Negative for chest pain and leg swelling.   Gastrointestinal:  Negative for abdominal pain, diarrhea, nausea and vomiting.   Genitourinary:  Negative for pelvic pain, vaginal bleeding and vaginal discharge.   Musculoskeletal:  Negative for back pain.   Neurological:  Negative for weakness, light-headedness and headaches.       OB Hx:  OB History    Para Term  AB  Living   2 0 0 0 1 0   SAB IAB Ectopic Multiple Live Births   1 0 0 0 0      # Outcome Date GA Lbr Saul/2nd Weight Sex Type Anes PTL Lv   2 Current            1 SAB 2010     Biochemical          Past Medical Hx:  Past Medical History:   Diagnosis Date    Migraine        Past Surgical hx:  Past Surgical History:   Procedure Laterality Date    OTHER SURGICAL HISTORY Right     Arm Fractured bone at 6 y.o       Social Hx:  Alcohol use: denies  Tobacco use: denies  Other substance use: denies        Allergies   Allergen Reactions    Cherry - Food Allergy Itching    Flavoring Agent - Food Allergy Rash     Treviño flavors    Shellfish Allergy - Food Allergy Rash    Sulfa Antibiotics Rash         Medications Prior to Admission:     Blood Glucose Monitoring Suppl (Contour Next EZ) w/Device KIT    Contour Next Test test strip    Microlet Lancets MISC    Prenatal Vit-Fe Fumarate-FA (PRENATAL PO)    Objective:  Temp:  [97.9 °F (36.6 °C)] 97.9 °F (36.6 °C)  HR:  [] 90  BP: (121-133)/(65-81) 121/65  Resp:  [16-18] 18  SpO2:  [98 %-99 %] 99 %  O2 Device: None (Room air)  Body mass index is 45.05 kg/m².     Physical Exam:  Physical Exam  Constitutional:       Appearance: Normal appearance.   Cardiovascular:      Rate and Rhythm: Normal rate and regular rhythm.   Pulmonary:      Effort: Pulmonary effort is normal. No respiratory distress.   Abdominal:      Palpations: Abdomen is soft.      Tenderness: There is no abdominal tenderness.   Musculoskeletal:         General: No swelling or tenderness.   Neurological:      General: No focal deficit present.      Mental Status: She is alert and oriented to person, place, and time.   Skin:     General: Skin is warm and dry.   Vitals reviewed.            FHT:  Baseline Rate (FHR): 135 bpm  Variability: Moderate  Accelerations: 15 x 15 or greater  Decelerations: None  FHR Category: Category I    TOCO:   Contraction Frequency (minutes): 0  Contraction Duration (seconds): 0    Lab Results    Component Value Date    WBC 7.61 04/20/2025    HGB 12.7 04/20/2025    HCT 39.2 04/20/2025     04/20/2025     Lab Results   Component Value Date    K 3.7 04/20/2025     04/20/2025    CO2 23 04/20/2025    BUN 10 04/20/2025    CREATININE 0.53 (L) 04/20/2025    AST 18 04/20/2025    ALT 15 04/20/2025     Prenatal Labs: Reviewed      Blood type: O pos  Antibody: Neg  GBS: Neg  HIV: Non-reactive  Rubella: Non-Immune  Syphilis IgM/IgG: Non-reactive  HBsAg: Non-reactive  HCAb: Non-reactive  Chlamydia: Negative  Gonorrhea: Negative  Diabetes 1 hour screen:  Elevated  3 hour glucose: Elevated  Platelets: 252  Hgb: 12.7  >2 Midnights  INPATIENT     Signature/Title: Laura Blair MD  Date: 4/21/2025  Time: 3:54 AM

## 2025-04-21 NOTE — PLAN OF CARE
Problem: ANTEPARTUM  Goal: Maintain pregnancy as long as maternal and/or fetal condition is stable  Description: INTERVENTIONS:- Maternal surveillance- Fetal surveillance- Monitor uterine activity- Medications as ordered- Bedrest  Outcome: Progressing     Problem: METABOLIC, FLUID AND ELECTROLYTES - ADULT  Goal: Glucose maintained within target range  Description: INTERVENTIONS:- Monitor Blood Glucose as ordered- Assess for signs and symptoms of hyperglycemia and hypoglycemia- Administer ordered medications to maintain glucose within target range- Assess nutritional intake and initiate nutrition service referral as needed  Outcome: Progressing     Problem: PAIN - ADULT  Goal: Verbalizes/displays adequate comfort level or baseline comfort level  Description: Interventions:- Encourage patient to monitor pain and request assistance- Assess pain using appropriate pain scale- Administer analgesics based on type and severity of pain and evaluate response- Implement non-pharmacological measures as appropriate and evaluate response- Consider cultural and social influences on pain and pain management- Notify physician/advanced practitioner if interventions unsuccessful or patient reports new pain  Outcome: Progressing     Problem: INFECTION - ADULT  Goal: Absence or prevention of progression during hospitalization  Description: INTERVENTIONS:- Assess and monitor for signs and symptoms of infection- Monitor lab/diagnostic results- Monitor all insertion sites, i.e. indwelling lines, tubes, and drains- Monitor endotracheal if appropriate and nasal secretions for changes in amount and color- [place] appropriate cooling/warming therapies per order- Administer medications as ordered- Instruct and encourage patient and family to use good hand hygiene technique- Identify and instruct in appropriate isolation precautions for identified infection/condition  Outcome: Progressing  Goal: Absence of fever/infection during neutropenic  period  Description: INTERVENTIONS:- Monitor WBC  Outcome: Progressing     Problem: SAFETY ADULT  Goal: Patient will remain free of falls  Description: INTERVENTIONS:- Educate patient/family on patient safety including physical limitations- Instruct patient to call for assistance with activity. Keep Call bell within reach- Keep bed low and locked with side rails adjusted as appropriate.  Outcome: Progressing  Goal: Maintain or return to baseline ADL function  Description: INTERVENTIONS:-  Assess patient's ability to carry out ADLs; assess patient's baseline for ADL function and identify physical deficits which impact ability to perform ADLs (bathing, care of mouth/teeth, toileting, grooming, dressing, etc.)- Assess/evaluate cause of self-care deficits - Assess range of motion- Assess patient's mobility; develop plan if impaired- Assess patient's need for assistive devices and provide as appropriate- Encourage maximum independence but intervene and supervise when necessary- Involve family in performance of ADLs- Assess for home care needs following discharge - Consider OT consult to assist with ADL evaluation and planning for discharge- Provide patient education as appropriate  Outcome: Progressing  Goal: Maintains/Returns to pre admission functional level  Description: INTERVENTIONS:- Perform AM-PAC 6 Click Basic Mobility/ Daily Activity assessment daily.- Set and communicate daily mobility goal to care team and patient/family/caregiver.  Outcome: Progressing     Problem: Knowledge Deficit  Goal: Patient/family/caregiver demonstrates understanding of disease process, treatment plan, medications, and discharge instructions  Description: Complete learning assessment and assess knowledge base.Interventions:- Provide teaching at level of understanding- Provide teaching via preferred learning methods  Outcome: Progressing     Problem: DISCHARGE PLANNING  Goal: Discharge to home or other facility with appropriate  resources  Description: INTERVENTIONS:- Identify barriers to discharge w/patient and caregiver- Arrange for needed discharge resources and transportation as appropriate- Identify discharge learning needs (meds, wound care, etc.)- Arrange for interpretive services to assist at discharge as needed- Refer to Case Management Department for coordinating discharge planning if the patient needs post-hospital services based on physician/advanced practitioner order or complex needs related to functional status, cognitive ability, or social support system  Outcome: Progressing

## 2025-04-21 NOTE — ASSESSMENT & PLAN NOTE
Elevated BP in ED 1/14/25 : 147/83 (notation states it was taken over patient's coat and may not be accurate)  and 2/22/25: 162/75 (also in the ED)  CBC/ CMP wnl   Monitor BPs

## 2025-04-21 NOTE — PLAN OF CARE
Problem: ANTEPARTUM  Goal: Maintain pregnancy as long as maternal and/or fetal condition is stable  Description: INTERVENTIONS:- Maternal surveillance- Fetal surveillance- Monitor uterine activity- Medications as ordered- Bedrest  4/21/2025 1728 by Skip Hawkins RN  Outcome: Adequate for Discharge  4/21/2025 0744 by Skip Hawkins RN  Outcome: Progressing     Problem: METABOLIC, FLUID AND ELECTROLYTES - ADULT  Goal: Glucose maintained within target range  Description: INTERVENTIONS:- Monitor Blood Glucose as ordered- Assess for signs and symptoms of hyperglycemia and hypoglycemia- Administer ordered medications to maintain glucose within target range- Assess nutritional intake and initiate nutrition service referral as needed  4/21/2025 1728 by Skip Hawkins RN  Outcome: Adequate for Discharge  4/21/2025 0744 by Skip Hawkins RN  Outcome: Progressing     Problem: PAIN - ADULT  Goal: Verbalizes/displays adequate comfort level or baseline comfort level  Description: Interventions:- Encourage patient to monitor pain and request assistance- Assess pain using appropriate pain scale- Administer analgesics based on type and severity of pain and evaluate response- Implement non-pharmacological measures as appropriate and evaluate response- Consider cultural and social influences on pain and pain management- Notify physician/advanced practitioner if interventions unsuccessful or patient reports new pain  4/21/2025 1728 by Skip Hawkins RN  Outcome: Adequate for Discharge  4/21/2025 0744 by Skip Hawkins RN  Outcome: Progressing     Problem: INFECTION - ADULT  Goal: Absence or prevention of progression during hospitalization  Description: INTERVENTIONS:- Assess and monitor for signs and symptoms of infection- Monitor lab/diagnostic results- Monitor all insertion sites, i.e. indwelling lines, tubes, and drains- Monitor endotracheal if appropriate and nasal  secretions for changes in amount and color- [place] appropriate cooling/warming therapies per order- Administer medications as ordered- Instruct and encourage patient and family to use good hand hygiene technique- Identify and instruct in appropriate isolation precautions for identified infection/condition  4/21/2025 1728 by Skip Hawkins RN  Outcome: Adequate for Discharge  4/21/2025 0744 by Skip Hawkins RN  Outcome: Progressing  Goal: Absence of fever/infection during neutropenic period  Description: INTERVENTIONS:- Monitor WBC  4/21/2025 1728 by Skip Hawkins RN  Outcome: Adequate for Discharge  4/21/2025 0744 by Skip Hawkins RN  Outcome: Progressing     Problem: SAFETY ADULT  Goal: Patient will remain free of falls  Description: INTERVENTIONS:- Educate patient/family on patient safety including physical limitations- Instruct patient to call for assistance with activity. Keep Call bell within reach- Keep bed low and locked with side rails adjusted as appropriate.  4/21/2025 1728 by Skip Hawkins RN  Outcome: Adequate for Discharge  4/21/2025 0744 by Skip Hawkins RN  Outcome: Progressing  Goal: Maintain or return to baseline ADL function  Description: INTERVENTIONS:-  Assess patient's ability to carry out ADLs; assess patient's baseline for ADL function and identify physical deficits which impact ability to perform ADLs (bathing, care of mouth/teeth, toileting, grooming, dressing, etc.)- Assess/evaluate cause of self-care deficits - Assess range of motion- Assess patient's mobility; develop plan if impaired- Assess patient's need for assistive devices and provide as appropriate- Encourage maximum independence but intervene and supervise when necessary- Involve family in performance of ADLs- Assess for home care needs following discharge - Consider OT consult to assist with ADL evaluation and planning for discharge- Provide patient education as  appropriate  4/21/2025 1728 by Skip Hawkins RN  Outcome: Adequate for Discharge  4/21/2025 0744 by Skip Hawkins RN  Outcome: Progressing  Goal: Maintains/Returns to pre admission functional level  Description: INTERVENTIONS:- Perform AM-PAC 6 Click Basic Mobility/ Daily Activity assessment daily.- Set and communicate daily mobility goal to care team and patient/family/caregiver.  4/21/2025 1728 by Skip Hawkins RN  Outcome: Adequate for Discharge  4/21/2025 0744 by Skip Hawkins RN  Outcome: Progressing     Problem: Knowledge Deficit  Goal: Patient/family/caregiver demonstrates understanding of disease process, treatment plan, medications, and discharge instructions  Description: Complete learning assessment and assess knowledge base.Interventions:- Provide teaching at level of understanding- Provide teaching via preferred learning methods  4/21/2025 1728 by Skip Hawkins RN  Outcome: Adequate for Discharge  4/21/2025 0744 by Skip Hawkins RN  Outcome: Progressing     Problem: DISCHARGE PLANNING  Goal: Discharge to home or other facility with appropriate resources  Description: INTERVENTIONS:- Identify barriers to discharge w/patient and caregiver- Arrange for needed discharge resources and transportation as appropriate- Identify discharge learning needs (meds, wound care, etc.)- Arrange for interpretive services to assist at discharge as needed- Refer to Case Management Department for coordinating discharge planning if the patient needs post-hospital services based on physician/advanced practitioner order or complex needs related to functional status, cognitive ability, or social support system  4/21/2025 1728 by Skip Hawkins RN  Outcome: Adequate for Discharge  4/21/2025 0744 by Skip Hawkins RN  Outcome: Progressing

## 2025-04-21 NOTE — ASSESSMENT & PLAN NOTE
Admit to OBGYN   Diabetic Diet  F/u T&S, CBC, RPR   NST  Analgesia at maternal request   Vertex by TAUS

## 2025-04-22 ENCOUNTER — ROUTINE PRENATAL (OUTPATIENT)
Dept: OBGYN CLINIC | Facility: MEDICAL CENTER | Age: 35
End: 2025-04-22
Payer: COMMERCIAL

## 2025-04-22 VITALS
SYSTOLIC BLOOD PRESSURE: 130 MMHG | WEIGHT: 272.4 LBS | DIASTOLIC BLOOD PRESSURE: 82 MMHG | HEART RATE: 110 BPM | OXYGEN SATURATION: 97 % | HEIGHT: 65 IN | BODY MASS INDEX: 45.38 KG/M2

## 2025-04-22 DIAGNOSIS — Z34.83 PRENATAL CARE, SUBSEQUENT PREGNANCY, THIRD TRIMESTER: Primary | ICD-10-CM

## 2025-04-22 DIAGNOSIS — R03.0 ELEVATED BLOOD PRESSURE READING WITHOUT DIAGNOSIS OF HYPERTENSION: ICD-10-CM

## 2025-04-22 DIAGNOSIS — O24.410 DIET CONTROLLED GESTATIONAL DIABETES MELLITUS (GDM) IN THIRD TRIMESTER: ICD-10-CM

## 2025-04-22 DIAGNOSIS — Z3A.36 36 WEEKS GESTATION OF PREGNANCY: ICD-10-CM

## 2025-04-22 DIAGNOSIS — O99.213 SEVERE OBESITY DUE TO EXCESS CALORIES AFFECTING PREGNANCY IN THIRD TRIMESTER (HCC): ICD-10-CM

## 2025-04-22 DIAGNOSIS — E66.01 MATERNAL MORBID OBESITY, ANTEPARTUM (HCC): ICD-10-CM

## 2025-04-22 DIAGNOSIS — E66.01 SEVERE OBESITY DUE TO EXCESS CALORIES AFFECTING PREGNANCY IN THIRD TRIMESTER (HCC): ICD-10-CM

## 2025-04-22 DIAGNOSIS — O99.210 MATERNAL MORBID OBESITY, ANTEPARTUM (HCC): ICD-10-CM

## 2025-04-22 LAB
BILE AC SERPL-SCNC: 2.8 UMOL/L (ref 0–10)
SL AMB  POCT GLUCOSE, UA: NEGATIVE
SL AMB LEUKOCYTE ESTERASE,UA: NORMAL
SL AMB POCT BILIRUBIN,UA: NORMAL
SL AMB POCT BLOOD,UA: NORMAL
SL AMB POCT CLARITY,UA: CLEAR
SL AMB POCT COLOR,UA: YELLOW
SL AMB POCT KETONES,UA: NORMAL
SL AMB POCT NITRITE,UA: NORMAL
SL AMB POCT PH,UA: NORMAL
SL AMB POCT SPECIFIC GRAVITY,UA: NORMAL
SL AMB POCT URINE PROTEIN: NORMAL
SL AMB POCT UROBILINOGEN: NORMAL

## 2025-04-22 PROCEDURE — 99214 OFFICE O/P EST MOD 30 MIN: CPT | Performed by: NURSE PRACTITIONER

## 2025-04-22 PROCEDURE — 81002 URINALYSIS NONAUTO W/O SCOPE: CPT | Performed by: NURSE PRACTITIONER

## 2025-04-22 PROCEDURE — 87150 DNA/RNA AMPLIFIED PROBE: CPT | Performed by: NURSE PRACTITIONER

## 2025-04-22 NOTE — ASSESSMENT & PLAN NOTE
TWG # 10.1 kg (22 lb 2.7 oz)  Stable GDM  Limit weight gain to 11-20 lbs.   Regular exercise encouraged.   Fetal growth scans every 4-6 weeks in 3rd trimester.

## 2025-04-22 NOTE — PROGRESS NOTES
"Problem   36 Weeks Gestation of Pregnancy   Severe Obesity Due to Excess Calories Affecting Pregnancy in Third Trimester (Hcc)   Elevated Blood Pressure Reading Without Diagnosis of Hypertension   Diet Controlled Gestational Diabetes Mellitus (Gdm) in Third Trimester    -Abnormal 3 hour GTT.  -Check A1c.        36 weeks gestation of pregnancy  Araceli Levy is a 35 y.o.  here for OB visit at 36w3d weeks accompanied by FOB.  TWG 10.1 kg (22 lb 2.7 oz).  Arrived 7 minutes after appt start time.   She was discharged from the hospital last night after a 24 hour stay for SOB and chest pain. Normal liver enzymes and bilirubin. Elevated D dimer. Chest CT showed no PE but \"Mild enlargement of the central pulmonary artery arterial tree which can be associated with pulmonary hypertension. \"  She was admitted until clear by cardiology.      Denies LOF, vaginal bleeding or contractions.   Performing FKC's daily 10 in 2 hours; does feel this fetus is less active some days than expected. Normal activity today and especially \"after eating chicken. \"  Perineal massage started.   GBS culture collected today.   TDAP refused. Aware of recommendations  [facility] appt 25  Pediatric CPR, childbirth,  education, breast feeding classes along with hospital tour recommended  RTO 1 weeks    Elevated blood pressure reading without diagnosis of hypertension  Normotensive and asymptomatic.   /82.  Normal preE labs 25      Severe obesity due to excess calories affecting pregnancy in third trimester (HCC)  TWG # 10.1 kg (22 lb 2.7 oz)  Stable GDM  Limit weight gain to 11-20 lbs.   Regular exercise encouraged.   Fetal growth scans every 4-6 weeks in 3rd trimester.    Diet controlled gestational diabetes mellitus (GDM) in third trimester    No results found for: \"HGBA1C\"  Follows with DP and admits to euglycemia.      "

## 2025-04-22 NOTE — ASSESSMENT & PLAN NOTE
"Araceli Levy is a 35 y.o.  here for OB visit at 36w3d weeks accompanied by FOB.  TWG 10.1 kg (22 lb 2.7 oz).  Arrived 7 minutes after appt start time.   She was discharged from the hospital last night after a 24 hour stay for SOB and chest pain. Normal liver enzymes and bilirubin. Elevated D dimer. Chest CT showed no PE but \"Mild enlargement of the central pulmonary artery arterial tree which can be associated with pulmonary hypertension. \"  She was admitted until clear by cardiology.      Denies LOF, vaginal bleeding or contractions.   Performing FKC's daily 10 in 2 hours; does feel this fetus is less active some days than expected. Normal activity today and especially \"after eating chicken. \"  Perineal massage started.   GBS culture collected today.   TDAP refused. Aware of recommendations  [facility] appt 25  Pediatric CPR, childbirth,  education, breast feeding classes along with hospital tour recommended  RTO 1 weeks  "

## 2025-04-22 NOTE — ED CARE HANDOFF
Emergency Department Sign Out Note        Sign out and transfer of care from Dr. Thompson. See Separate Emergency Department note.     The patient, Araceli Levy, was evaluated by the previous provider for pleuritic chest pain x 3 days in third trimester pregnancy decreased fetal movement, .    Workup Completed:  Studies including elevated D-dimer    ED Course / Workup Pending (followup):  CTA pending.  Will require fetal monitoring following evaluation of chest pain.      HEART Risk Score      Flowsheet Row Most Recent Value   Heart Score Risk Calculator    History 1 Filed at: 04/20/2025 2302   ECG 0 Filed at: 04/20/2025 2302   Age 0 Filed at: 04/20/2025 2302   Risk Factors 1 Filed at: 04/20/2025 2302   Troponin 0 Filed at: 04/20/2025 2302   HEART Score 2 Filed at: 04/20/2025 2302          No data recorded                             Procedures  Medical Decision Making  Amount and/or Complexity of Data Reviewed  Labs: ordered.  Radiology: ordered and independent interpretation performed.    Risk  Prescription drug management.    CTA returned notable for absence of PE.  Findings on this raised concern for possible pulmonary hypertension.  Given acute symptoms with chest discomfort further in-hospital evaluation appropriate.  Dr. Nava contacted OB/GYN team.  They will bring her onto their service.        Disposition  Final diagnoses:   Chest pain   SOB (shortness of breath)   Decreased fetal movements   Suspected pulmonary hypertension     Time reflects when diagnosis was documented in both MDM as applicable and the Disposition within this note       Time User Action Codes Description Comment    4/21/2025  1:00 AM Ximena Nava Add [R07.9] Chest pain     4/21/2025  1:00 AM Ximena Nava Add [R06.02] SOB (shortness of breath)     4/21/2025  1:00 AM Ximena Nava Add [O36.8190] Decreased fetal movements     4/21/2025  1:00 AM Ximena Nava Add [R09.89] Suspected pulmonary hypertension      4/21/2025  4:07 PM KaygordonsmithaNataliaDenisa Add [R05.9] Cough           ED Disposition       ED Disposition   Send to L&D After Provider Eval    Condition   --    Date/Time   Mon Apr 21, 2025 0059    Comment   --             Follow-up Information       Follow up With Specialties Details Why Contact Info Additional Information    [facility] Obstetrics & Gynecology [facility] Obstetrics and Gynecology Follow up Follow up with your doctor in a week [address]  [address]  [facility] [address] [phone] [facility] Obstetrics & Gynecology [facility], [address], [place], [place], [address] [phone]          Discharge Medication List as of 4/21/2025  4:37 PM        START taking these medications    Details   acetaminophen (TYLENOL) 325 mg tablet Take 3 tablets (975 mg total) by mouth every 8 (eight) hours as needed for mild pain, Starting Mon 4/21/2025, No Print      benzonatate (TESSALON PERLES) 100 mg capsule Take 1 capsule (100 mg total) by mouth 3 (three) times a day as needed for cough, Starting Mon 4/21/2025, Normal      loratadine (CLARITIN) 10 mg tablet Take 1 tablet (10 mg total) by mouth daily, Starting Mon 4/21/2025, Normal           CONTINUE these medications which have NOT CHANGED    Details   Blood Glucose Monitoring Suppl (Contour Next EZ) w/Device KIT Dispense 1 kit per insurance formulary. GDM., Normal      Contour Next Test test strip Test 4 times a day. GDM, Normal      Microlet Lancets MISC Use 4 a day. GDM, Normal      Prenatal Vit-Fe Fumarate-FA (PRENATAL PO) Take by mouth, Historical Med           No discharge procedures on file.       ED Provider  Electronically Signed by     Lydia Bishop MD  04/21/25 1245

## 2025-04-24 ENCOUNTER — RESULTS FOLLOW-UP (OUTPATIENT)
Dept: OBGYN CLINIC | Facility: MEDICAL CENTER | Age: 35
End: 2025-04-24

## 2025-04-24 LAB — GP B STREP DNA SPEC QL NAA+PROBE: NEGATIVE

## 2025-04-25 ENCOUNTER — ULTRASOUND (OUTPATIENT)
Dept: PERINATAL CARE | Facility: OTHER | Age: 35
End: 2025-04-25
Payer: COMMERCIAL

## 2025-04-25 VITALS
HEIGHT: 65 IN | HEART RATE: 94 BPM | BODY MASS INDEX: 45.98 KG/M2 | WEIGHT: 276 LBS | DIASTOLIC BLOOD PRESSURE: 68 MMHG | SYSTOLIC BLOOD PRESSURE: 118 MMHG

## 2025-04-25 DIAGNOSIS — O99.213 SEVERE OBESITY DUE TO EXCESS CALORIES AFFECTING PREGNANCY IN THIRD TRIMESTER (HCC): Primary | ICD-10-CM

## 2025-04-25 DIAGNOSIS — E66.01 SEVERE OBESITY DUE TO EXCESS CALORIES AFFECTING PREGNANCY IN THIRD TRIMESTER (HCC): Primary | ICD-10-CM

## 2025-04-25 DIAGNOSIS — O36.63X0 MACROSOMIA OF FETUS AFFECTING MANAGEMENT OF MOTHER IN THIRD TRIMESTER, SINGLE OR UNSPECIFIED FETUS: ICD-10-CM

## 2025-04-25 DIAGNOSIS — Z3A.36 36 WEEKS GESTATION OF PREGNANCY: ICD-10-CM

## 2025-04-25 DIAGNOSIS — O36.8130 DECREASED FETAL MOVEMENTS IN THIRD TRIMESTER, SINGLE OR UNSPECIFIED FETUS: ICD-10-CM

## 2025-04-25 DIAGNOSIS — O24.410 DIET CONTROLLED GESTATIONAL DIABETES MELLITUS (GDM) IN THIRD TRIMESTER: ICD-10-CM

## 2025-04-25 DIAGNOSIS — Z36.89 ENCOUNTER FOR ULTRASOUND TO ASSESS FETAL GROWTH: ICD-10-CM

## 2025-04-25 PROBLEM — Z3A.37 37 WEEKS GESTATION OF PREGNANCY: Status: ACTIVE | Noted: 2025-04-25

## 2025-04-25 PROCEDURE — 76816 OB US FOLLOW-UP PER FETUS: CPT | Performed by: OBSTETRICS & GYNECOLOGY

## 2025-04-25 PROCEDURE — 99214 OFFICE O/P EST MOD 30 MIN: CPT | Performed by: OBSTETRICS & GYNECOLOGY

## 2025-04-25 PROCEDURE — 59025 FETAL NON-STRESS TEST: CPT | Performed by: OBSTETRICS & GYNECOLOGY

## 2025-04-25 NOTE — PROGRESS NOTES
[facility]: Araceli was seen today for NST (found under the pregnancy episode) which I reviewed the RN assessment and agree, and fetal growth ultrasound (see ultrasound report under OB procedures tab).   Snip (low quality image) of growth curve:        The time spent on this established patient on the encounter date included 2 minutes previsit service time reviewing records and precharting, 7 minutes face-to-face service time counseling regarding results and coordinating care, and  4 minutes charting, totalling 13 minutes.  Please don't hesitate to contact our office with any concerns or questions.  -Nathalia Marquez MD

## 2025-04-25 NOTE — PROGRESS NOTES
Repeat Non-Stress Testing:    Patient verbalizes +FM. Pt denies ALL:               Leaking of fluid   Contractions   Vaginal bleeding   Decreased fetal movement    Patient is performing daily kick counts. Patient has no questions or concerns.   NST strip reviewed by STEW Bella in-person.

## 2025-04-25 NOTE — LETTER
"   Date: 2025    Saima Josue MD  [address] [facility]  [address]  [place] [address]    Patient: Araceli Levy   YOB: 1990   Date of Visit: 2025   Gestational age 36w6d   Nature of this communication: Routine though please note EFW and decreased movement.       This patient was seen recently in our  office.  Please see ultrasound report under \"OB Procedures\" tab.  Please don't hesitate to contact our office with any concerns or questions.      Sincerely,      Nathalia Marquez MD  Attending Physician, Maternal-Fetal Medicine  [facility]      "

## 2025-04-27 ENCOUNTER — HOSPITAL ENCOUNTER (EMERGENCY)
Facility: HOSPITAL | Age: 35
Discharge: HOME | DRG: 540 | End: 2025-04-27
Attending: STUDENT IN AN ORGANIZED HEALTH CARE EDUCATION/TRAINING PROGRAM | Admitting: STUDENT IN AN ORGANIZED HEALTH CARE EDUCATION/TRAINING PROGRAM
Payer: COMMERCIAL

## 2025-04-27 VITALS
DIASTOLIC BLOOD PRESSURE: 76 MMHG | SYSTOLIC BLOOD PRESSURE: 132 MMHG | TEMPERATURE: 98 F | HEIGHT: 65 IN | RESPIRATION RATE: 16 BRPM | WEIGHT: 276 LBS | BODY MASS INDEX: 45.98 KG/M2 | HEART RATE: 107 BPM

## 2025-04-27 PROBLEM — O36.8190 DECREASED FETAL MOVEMENT AFFECTING MANAGEMENT OF MOTHER, ANTEPARTUM: Status: ACTIVE | Noted: 2025-04-27

## 2025-04-27 LAB
BILIRUB UR QL STRIP: NEGATIVE
CLARITY UR: CLEAR
COLOR UR: YELLOW
GLUCOSE UR STRIP-MCNC: ABNORMAL MG/DL
HGB UR QL STRIP.AUTO: NEGATIVE
KETONES UR STRIP-MCNC: NEGATIVE MG/DL
LEUKOCYTE ESTERASE UR QL STRIP: NEGATIVE
NITRITE UR QL STRIP: NEGATIVE
PH UR STRIP.AUTO: 6 [PH] (ref 4.5–8)
PROT UR STRIP-MCNC: NEGATIVE MG/DL
SP GR UR STRIP.AUTO: >=1.03 (ref 1–1.03)
UROBILINOGEN UR QL STRIP.AUTO: 0.2 E.U./DL

## 2025-04-27 PROCEDURE — 81003 URINALYSIS AUTO W/O SCOPE: CPT

## 2025-04-27 PROCEDURE — 99214 OFFICE O/P EST MOD 30 MIN: CPT

## 2025-04-27 PROCEDURE — NC001 PR NO CHARGE: Performed by: STUDENT IN AN ORGANIZED HEALTH CARE EDUCATION/TRAINING PROGRAM

## 2025-04-27 PROCEDURE — 76815 OB US LIMITED FETUS(S): CPT

## 2025-04-28 ENCOUNTER — ROUTINE PRENATAL (OUTPATIENT)
Dept: PERINATAL CARE | Facility: OTHER | Age: 35
End: 2025-04-28
Payer: COMMERCIAL

## 2025-04-28 VITALS
HEIGHT: 65 IN | OXYGEN SATURATION: 98 % | SYSTOLIC BLOOD PRESSURE: 112 MMHG | HEART RATE: 101 BPM | WEIGHT: 277.8 LBS | DIASTOLIC BLOOD PRESSURE: 68 MMHG | BODY MASS INDEX: 46.28 KG/M2

## 2025-04-28 DIAGNOSIS — O99.213 SEVERE OBESITY DUE TO EXCESS CALORIES AFFECTING PREGNANCY IN THIRD TRIMESTER (HCC): ICD-10-CM

## 2025-04-28 DIAGNOSIS — Z3A.37 37 WEEKS GESTATION OF PREGNANCY: Primary | ICD-10-CM

## 2025-04-28 DIAGNOSIS — E66.01 SEVERE OBESITY DUE TO EXCESS CALORIES AFFECTING PREGNANCY IN THIRD TRIMESTER (HCC): ICD-10-CM

## 2025-04-28 LAB
ATRIAL RATE: 100 BPM
P AXIS: 54 DEGREES
PR INTERVAL: 136 MS
QRS AXIS: 82 DEGREES
QRSD INTERVAL: 82 MS
QT INTERVAL: 368 MS
QTC INTERVAL: 474 MS
T WAVE AXIS: 15 DEGREES
VENTRICULAR RATE: 100 BPM

## 2025-04-28 PROCEDURE — 99214 OFFICE O/P EST MOD 30 MIN: CPT

## 2025-04-28 PROCEDURE — 59025 FETAL NON-STRESS TEST: CPT

## 2025-04-28 PROCEDURE — 93010 ELECTROCARDIOGRAM REPORT: CPT | Performed by: INTERNAL MEDICINE

## 2025-04-28 NOTE — PROGRESS NOTES
[facility]  Center: Ms. Levy was seen today at 37w2d gestational age for NST (found under the pregnancy episode) which I reviewed the RN assessment and agree.     Araceli presents for her twice weekly antepartum fetal surveillance today at [facility] for the indications of class III obesity and chronic decreased fetal movement. She reports appropriate fetal movement during her testing, but reports that over the course of the entire weekend, she did not feel fetal movement. She has been previously concealed by me on fetal kick counts and seeking further evaluation. Araceli was seen in triage yesterday , where she was ultimately discharged after normal testing.     Nonstress test was reactive without decelerations. The patient's blood sugars continue to remain well-controlled, given that she's an A1GDM.    Recommendations:  Due to Kory's persistent decreased fetal movement, recommend an induction of labor, as this can be considered from 37 weeks 0 days for her complaint. The patient sees OB tomorrow,  for a prenatal visit, and prefers to set up an induction date and time, which we discussed should be as soon as possible.    2.   Also discussed in detail that if fetal kick counts are not met between today and tomorrow, to return to call her OB and likely return to L&D for delivery.    I spent 10 minutes devoted to patient care (3 min chart preparation, 4 minutes face to face and 3 minutes documenting).      Teresa MATHIAS

## 2025-04-28 NOTE — DISCHARGE INSTR - AVS FIRST PAGE
Kick Counts in Pregnancy   WHAT YOU NEED TO KNOW:   Kick counts measure how much your baby is moving in your womb. A kick from your baby can be felt as a twist, turn, swish, roll, or jab. It is common to feel your baby kicking at 26 to 28 weeks of pregnancy. You may feel your baby kick as early as 20 weeks of pregnancy. You may want to start counting at 28 weeks.   DISCHARGE INSTRUCTIONS:   Contact your healthcare provider immediately if:   You feel a change in the number of kicks or movements of your baby.      You feel fewer than 10 kicks within 2 hours.      You have questions or concerns about your baby's movements.     Why measure kick counts: Your baby's movement may provide information about your baby's health. He or she may move less, or not at all, if there are problems. Your baby may move less if he or she is not getting enough oxygen or nutrition from the placenta. Do not smoke while you are pregnant. Smoking decreases the amount of oxygen that gets to your baby. Talk to your healthcare provider if you need help to quit smoking. Tell your healthcare provider as soon as you feel a change in your baby's movements.  When to measure kick counts:   Measure kick counts at the same time every day.      Measure kick counts when your baby is awake and most active. Your baby may be most active in the evening.     How to measure kick counts: Check that your baby is awake before you measure kick counts. You can wake up your baby by lightly pushing on your belly, walking, or drinking something cold. Your healthcare provider may tell you different ways to measure kick counts. You may be told to do the following:  Use a chart or clock to keep track of the time you start and finish counting.      Sit in a chair or lie on your left side.      Place your hands on the largest part of your belly.      Count until you reach 10 kicks. Write down how much time it takes to count 10 kicks.      It may take 30 minutes to 2 hours  to count 10 kicks. It should not take more than 2 hours to count 10 kicks.     Follow up with your healthcare provider as directed: Write down your questions so you remember to ask them during your visits.   © Copyright [employer] 2020 Information is for End User's use only and may not be sold, redistributed or otherwise used for commercial purposes. All illustrations and images included in CareNotes® are the copyrighted property of [employer], [employer]. or [employer]  The above information is an  only. It is not intended as medical advice for individual conditions or treatments. Talk to your doctor, nurse or pharmacist before following any medical regimen to see if it is safe and effective for you.

## 2025-04-28 NOTE — PATIENT INSTRUCTIONS
Kick Counts in Pregnancy   AMBULATORY CARE:   Kick counts  measure how much your baby is moving in your womb. A kick from your baby can be felt as a twist, turn, swish, roll, or jab. It is common to feel your baby kicking at 26 to 28 weeks of pregnancy. You may feel your baby kick as early as 20 weeks of pregnancy. You may want to start counting at 28 weeks.   Contact your doctor immediately if:   You feel a change in the number of kicks or movements of your baby.      You feel fewer than 10 kicks within 2 hours.      You have questions or concerns about your baby's movements.     Why measure kick counts:  Your baby's movement may provide information about your baby's health. He or she may move less, or not at all, if there are problems. Your baby may move less if he or she is not getting enough oxygen or nutrition from the placenta. Do not smoke while you are pregnant. Smoking decreases the amount of oxygen that gets to your baby. Talk to your healthcare provider if you need help to quit smoking. Tell your healthcare provider as soon as you feel a change in your baby's movements.  When to measure kick counts:   Measure kick counts at the same time every day.       Measure kick counts when your baby is awake and most active. Your baby may be most active in the evening.     How to measure kick counts:  Check that your baby is awake before you measure kick counts. You can wake up your baby by lightly pushing on your belly, walking, or drinking something cold. Your healthcare provider may tell you different ways to measure kick counts. You may be told to do the following:  Use a chart or clock to keep track of the time you start and finish counting.      Sit in a chair or lie on your left side.      Place your hands on the largest part of your belly.      Count until you reach 10 kicks. Write down how much time it takes to count 10 kicks.      It may take 30 minutes to 2 hours to count 10 kicks. It should not take more  Seizure: on 8/9, 8/11, 8/13   - Cont Topamax (weaning)  - Cont Onfi   than 2 hours to count 10 kicks.     Follow up with your doctor as directed:  Write down your questions so you remember to ask them during your visits.   © Copyright [employer] 2023 Information is for End User's use only and may not be sold, redistributed or otherwise used for commercial purposes.  The above information is an  only. It is not intended as medical advice for individual conditions or treatments. Talk to your doctor, nurse or pharmacist before following any medical regimen to see if it is safe and effective for you.

## 2025-04-28 NOTE — PROGRESS NOTES
L&D Triage Note - OB/GYN  Araceli Levy 35 y.o. female MRN: [id number]  Unit/Bed#:  TRIAGE  Encounter: [id number]      ASSESSMENT:    Araceli Levy is a 35 y.o.  at 37w1d who was evaluated today for decreased fetal movement. NST is reactive and ZACHARY within normal limits with fetal movement seen on ultrasound. Safe for discharge to home with return precautions reviewed.    PLAN:    1) Decreased Fetal Movement  - ZACHARY: 11.6 cm  - NST reactive   - movement seen on ultrasound       2) 37 weeks gestation of pregnancy  - Continue routine prenatal care  - Discharge from OB triage with term labor precautions    - Reviewed rupture of membranes, false vs true labor, decreased fetal movement, and vaginal bleeding   - Pt to call provider with any concerns and follow up at her next scheduled prenatal appointment 2025   - Case discussed with Dr. Pérez    SUBJECTIVE:    Araceli Levy 35 y.o.  at 37w1d with an Estimated Date of Delivery: 25 presenting with decreased fetal movement, which has been a problem for her throughout this pregnancy. She is getting twice weekly APFS. She has not felt much movement today and has been unable to meet her kick count. She tried drinking ice cold water, something sugary, and laying quietly in a dark room. She still was not able to meet kick counts.    Her current pregnancy has been complicated by A1GDM.      Contractions: occasional  Leakage of fluid: Denies  Vaginal Bleeding: Denies  Fetal movement: present, decreased     OBJECTIVE:    Vitals:    25   BP:    Pulse:    Resp:    Temp: 98 °F (36.7 °C)       ROS:  Constitutional: Negative  Respiratory: Negative  Cardiovascular: Negative   Gastrointestinal: Negative    General Physical Exam:  General: Well appearing, no distress  Respiratory: Unlabored breathing  Cardiovascular: Regular rate.  Abdomen: Soft, gravid, nontender  Fundal Height: Appropriate for gestational age.  Extremities: Warm and  well perfused.  Non tender.    Cervical Exam  SVE: 1 / 0% / -3    Fetal monitoring:  Fetal heart rate: Baseline Rate (FHR): 150 bpm  Variability: Moderate  Accelerations: 15 x 15 or greater, At variable times  Decelerations: None  FHR Category: Category I  [place]: Contraction Frequency (minutes): 0    Imaging:        Abd. US   ZACHARY      - Q1 1.85cm     - Q2 3.80cm     - Q3 1.16cm     - Q4 4.87cm     - Total: 11.68 cm   Presentation: cephalic          Mimi Reece MD  OBGYN, PGY-I  04/27/25  8:34 PM

## 2025-04-28 NOTE — PROCEDURES
Araceli Levy, a  at 37w1d with an AKILAH of 2025, by Ultrasound, was seen at [facility] LABOR AND DELIVERY for the following procedure(s): $Procedure Type: ZACHARY]         4 Quadrant ZACHARY  ZACHARY Q1 (cm): 3.8 cm  ZACHARY Q2 (cm): 1.9 cm  ZACHARY Q3 (cm): 4.9 cm  ZACHARY Q4 (cm): 1.2 cm  ZACHARY TOTAL (cm): 11.8 cm             Mimi Reece MD  [facility], PGY-I  25  1:47 AM

## 2025-04-29 ENCOUNTER — ROUTINE PRENATAL (OUTPATIENT)
Dept: OBGYN CLINIC | Facility: MEDICAL CENTER | Age: 35
End: 2025-04-29
Payer: COMMERCIAL

## 2025-04-29 ENCOUNTER — HOSPITAL ENCOUNTER (INPATIENT)
Facility: HOSPITAL | Age: 35
LOS: 5 days | Discharge: HOME | DRG: 540 | End: 2025-05-04
Attending: OBSTETRICS & GYNECOLOGY | Admitting: OBSTETRICS & GYNECOLOGY
Payer: COMMERCIAL

## 2025-04-29 VITALS
WEIGHT: 274 LBS | DIASTOLIC BLOOD PRESSURE: 82 MMHG | HEART RATE: 109 BPM | BODY MASS INDEX: 45.65 KG/M2 | HEIGHT: 65 IN | SYSTOLIC BLOOD PRESSURE: 112 MMHG

## 2025-04-29 DIAGNOSIS — O24.410 DIET CONTROLLED GESTATIONAL DIABETES MELLITUS (GDM) IN THIRD TRIMESTER: ICD-10-CM

## 2025-04-29 DIAGNOSIS — O61.8 OTHER FAILED INDUCTION OF LABOR: ICD-10-CM

## 2025-04-29 DIAGNOSIS — Z34.83 PRENATAL CARE, SUBSEQUENT PREGNANCY, THIRD TRIMESTER: Primary | ICD-10-CM

## 2025-04-29 DIAGNOSIS — Z98.891 STATUS POST PRIMARY LOW TRANSVERSE CESAREAN SECTION: Primary | ICD-10-CM

## 2025-04-29 DIAGNOSIS — Z3A.37 37 WEEKS GESTATION OF PREGNANCY: ICD-10-CM

## 2025-04-29 DIAGNOSIS — O36.8130 DECREASED FETAL MOVEMENTS IN THIRD TRIMESTER, SINGLE OR UNSPECIFIED FETUS: ICD-10-CM

## 2025-04-29 DIAGNOSIS — G89.18 POSTOPERATIVE PAIN: ICD-10-CM

## 2025-04-29 PROBLEM — O09.899 RUBELLA NON-IMMUNE STATUS, ANTEPARTUM: Status: ACTIVE | Noted: 2025-04-29

## 2025-04-29 PROBLEM — Z28.39 RUBELLA NON-IMMUNE STATUS, ANTEPARTUM: Status: ACTIVE | Noted: 2025-04-29

## 2025-04-29 LAB
ABO GROUP BLD: NORMAL
BASOPHILS # BLD AUTO: 0.05 THOUSANDS/ÂΜL (ref 0–0.1)
BASOPHILS NFR BLD AUTO: 0 % (ref 0–1)
BLD GP AB SCN SERPL QL: NEGATIVE
EOSINOPHIL # BLD AUTO: 0.16 THOUSAND/ÂΜL (ref 0–0.61)
EOSINOPHIL NFR BLD AUTO: 1 % (ref 0–6)
ERYTHROCYTE [DISTWIDTH] IN BLOOD BY AUTOMATED COUNT: 14.6 % (ref 11.6–15.1)
GLUCOSE SERPL-MCNC: 100 MG/DL (ref 65–140)
GLUCOSE SERPL-MCNC: 80 MG/DL (ref 65–140)
HCT VFR BLD AUTO: 37.8 % (ref 34.8–46.1)
HGB BLD-MCNC: 12.4 G/DL (ref 11.5–15.4)
HOLD SPECIMEN: NORMAL
IMM GRANULOCYTES # BLD AUTO: 0.16 THOUSAND/UL (ref 0–0.2)
IMM GRANULOCYTES NFR BLD AUTO: 1 % (ref 0–2)
LYMPHOCYTES # BLD AUTO: 2.14 THOUSANDS/ÂΜL (ref 0.6–4.47)
LYMPHOCYTES NFR BLD AUTO: 17 % (ref 14–44)
MCH RBC QN AUTO: 26.3 PG (ref 26.8–34.3)
MCHC RBC AUTO-ENTMCNC: 32.8 G/DL (ref 31.4–37.4)
MCV RBC AUTO: 80 FL (ref 82–98)
MONOCYTES # BLD AUTO: 0.85 THOUSAND/ÂΜL (ref 0.17–1.22)
MONOCYTES NFR BLD AUTO: 7 % (ref 4–12)
NEUTROPHILS # BLD AUTO: 9.19 THOUSANDS/ÂΜL (ref 1.85–7.62)
NEUTS SEG NFR BLD AUTO: 74 % (ref 43–75)
NRBC BLD AUTO-RTO: 0 /100 WBCS
PLATELET # BLD AUTO: 263 THOUSANDS/UL (ref 149–390)
PMV BLD AUTO: 8.9 FL (ref 8.9–12.7)
RBC # BLD AUTO: 4.71 MILLION/UL (ref 3.81–5.12)
RH BLD: POSITIVE
SPECIMEN EXPIRATION DATE: NORMAL
WBC # BLD AUTO: 12.55 THOUSAND/UL (ref 4.31–10.16)

## 2025-04-29 PROCEDURE — 0U7C7DJ DILATION OF CERVIX WITH INTRALUM DEV, TEMP, VIA OPENING: ICD-10-PCS | Performed by: OBSTETRICS & GYNECOLOGY

## 2025-04-29 PROCEDURE — 82948 REAGENT STRIP/BLOOD GLUCOSE: CPT

## 2025-04-29 PROCEDURE — 3E0P7VZ INTRODUCTION OF HORMONE INTO FEMALE REPRODUCTIVE, VIA NATURAL OR ARTIFICIAL OPENING: ICD-10-PCS | Performed by: OBSTETRICS & GYNECOLOGY

## 2025-04-29 PROCEDURE — 86780 TREPONEMA PALLIDUM: CPT

## 2025-04-29 PROCEDURE — 99213 OFFICE O/P EST LOW 20 MIN: CPT | Performed by: STUDENT IN AN ORGANIZED HEALTH CARE EDUCATION/TRAINING PROGRAM

## 2025-04-29 PROCEDURE — 86901 BLOOD TYPING SEROLOGIC RH(D): CPT

## 2025-04-29 PROCEDURE — NC001 PR NO CHARGE: Performed by: OBSTETRICS & GYNECOLOGY

## 2025-04-29 PROCEDURE — 86850 RBC ANTIBODY SCREEN: CPT

## 2025-04-29 PROCEDURE — 85025 COMPLETE CBC W/AUTO DIFF WBC: CPT

## 2025-04-29 PROCEDURE — 4A1HXCZ MONITORING OF PRODUCTS OF CONCEPTION, CARDIAC RATE, EXTERNAL APPROACH: ICD-10-PCS | Performed by: OBSTETRICS & GYNECOLOGY

## 2025-04-29 PROCEDURE — 86900 BLOOD TYPING SEROLOGIC ABO: CPT

## 2025-04-29 RX ORDER — BUTORPHANOL TARTRATE 1 MG/ML
1 INJECTION, SOLUTION INTRAMUSCULAR; INTRAVENOUS ONCE
Status: COMPLETED | OUTPATIENT
Start: 2025-04-29 | End: 2025-04-29

## 2025-04-29 RX ORDER — BUPIVACAINE HYDROCHLORIDE 2.5 MG/ML
30 INJECTION, SOLUTION EPIDURAL; INFILTRATION; INTRACAUDAL; PERINEURAL ONCE AS NEEDED
Status: DISCONTINUED | OUTPATIENT
Start: 2025-04-29 | End: 2025-05-01

## 2025-04-29 RX ORDER — SODIUM CHLORIDE 9 MG/ML
125 INJECTION, SOLUTION INTRAVENOUS CONTINUOUS
Status: DISCONTINUED | OUTPATIENT
Start: 2025-04-29 | End: 2025-05-01

## 2025-04-29 RX ORDER — LORATADINE 10 MG/1
10 TABLET ORAL DAILY
Status: DISCONTINUED | OUTPATIENT
Start: 2025-04-29 | End: 2025-05-04 | Stop reason: HOSPADM

## 2025-04-29 RX ORDER — ONDANSETRON 2 MG/ML
4 INJECTION INTRAMUSCULAR; INTRAVENOUS EVERY 6 HOURS PRN
Status: DISCONTINUED | OUTPATIENT
Start: 2025-04-29 | End: 2025-05-01

## 2025-04-29 RX ADMIN — Medication 25 MCG: at 18:55

## 2025-04-29 RX ADMIN — BUTORPHANOL TARTRATE 1 MG: 1 INJECTION, SOLUTION INTRAMUSCULAR; INTRAVENOUS at 21:28

## 2025-04-29 NOTE — ASSESSMENT & PLAN NOTE
36 yo  at 37+3 here for routine ob visit. She denies regular contractions. No bleeding. Reports continued decreased fetal movement. Recommend she present to triage for add on IOL.

## 2025-04-29 NOTE — H&P
H & P- Obstetrics   Araceli Levy 35 y.o. female MRN: [id number]  Unit/Bed#: LD TRIAGE  Encounter: [id number]    Assessment: 35 y.o.  at 37w3d admitted for induction of labor in setting of chronic DFM.  SVE: 1/70/-3  FHT: cat 1:     Plan:   Rubella non-immune status, antepartum  Assessment & Plan  Offer vaccine postpartum    37 weeks gestation of pregnancy  Assessment & Plan  Admit to OBGYN   Clear liquid diet, IVF LR 125cc/hr   F/u T&S, CBC, RPR   GBS negative; EFW: 89% , AC > 99%  @36w6d   SVE: 1/70/-3  Continuous fetal monitoring and tocometry   Analgesia at maternal request   Vertex by TAUS  Contraception plan: undecided  Plan: FB + cytotec, followed by pitocin and AROM       Macrosomia affecting management of mother in third trimester  Assessment & Plan  EFW 89%, AC > 99% @36w6d   Counseled on shoulder dystocia at prenatal appointment  prior to arrival for IOL    Decreased fetal movement during pregnancy in third trimester, antepartum  Assessment & Plan  Plan: IOL  See plan under 37 weeks gestation of pregnancy    Elevated blood pressure reading without diagnosis of hypertension  Assessment & Plan  Elevated BP 25  Monitor blood pressures during labor  Monitor for signs and symptoms of preeclampsia    Diet controlled gestational diabetes mellitus (GDM) in third trimester  Assessment & Plan  Follow up POC glucose in labor: q4h during cervical ripening, q2h during latent phase, q1h during active phase        Discussed case and plan w/ Dr. Sandoval      Chief Complaint: decreased fetal movement    HPI: Araceli Levy is a 35 y.o.  with an AKILAH of 2025, by Ultrasound at 37w3d who is being admitted for induction of labor for chronic decreased fetal movement. She denies having uterine contractions, has no LOF, and reports no VB. She states there is decreased FM.     Patient Active Problem List   Diagnosis    Fatty liver    Hirsutism    Panic disorder with agoraphobia    Refractory  migraine without aura    Pelvic pain during pregnancy    Glucosuria    Round ligament pain    Diet controlled gestational diabetes mellitus (GDM) in third trimester    Supervision of normal pregnancy    Elevated blood pressure reading without diagnosis of hypertension    Severe obesity due to excess calories affecting pregnancy in third trimester (HCC)    Shortness of breath    Itching    Prenatal care, subsequent pregnancy, third trimester    Decreased fetal movement during pregnancy in third trimester, antepartum    Macrosomia affecting management of mother in third trimester    37 weeks gestation of pregnancy    Decreased fetal movement affecting management of mother, antepartum    Rubella non-immune status, antepartum       Baby complications/comments: suspected macrosomia    Review of Systems   Constitutional:  Negative for chills and fever.   Eyes:  Negative for visual disturbance.   Respiratory:  Negative for shortness of breath.    Cardiovascular:  Negative for chest pain and palpitations.   Gastrointestinal:  Negative for abdominal pain.   Genitourinary:  Negative for dysuria and vaginal bleeding.   Skin:  Negative for rash.   Psychiatric/Behavioral: Negative.         OB Hx:  OB History    Para Term  AB Living   2 0 0 0 1 0   SAB IAB Ectopic Multiple Live Births   1 0 0 0 0      # Outcome Date GA Lbr Saul/2nd Weight Sex Type Anes PTL Lv   2 Current            1 SAB 2010     Biochemical          Past Medical Hx:  Past Medical History:   Diagnosis Date    Migraine        Past Surgical hx:  Past Surgical History:   Procedure Laterality Date    OTHER SURGICAL HISTORY Right     Arm Fractured bone at 6 y.o       Allergies   Allergen Reactions    Cherry - Food Allergy Itching    Flavoring Agent - Food Allergy Rash     Treviño flavors    Shellfish Allergy - Food Allergy Rash    Sulfa Antibiotics Rash         Medications Prior to Admission:     acetaminophen (TYLENOL) 325 mg tablet    benzonatate  (TESDALTON DIEHL) 100 mg capsule    Blood Glucose Monitoring Suppl (Contour Next EZ) w/Device KIT    Contour Next Test test strip    loratadine (CLARITIN) 10 mg tablet    Microlet Lancets MISC    Prenatal Vit-Fe Fumarate-FA (PRENATAL PO)    Objective:  Temp:  [98.2 °F (36.8 °C)] 98.2 °F (36.8 °C)  HR:  [] 96  BP: (105-112)/(52-82) 105/52  Resp:  [20] 20  Body mass index is 47.59 kg/m².     Physical Exam:  Physical Exam  Constitutional:       Appearance: Normal appearance.   Genitourinary:      Vulva normal.   HENT:      Head: Normocephalic and atraumatic.   Eyes:      Extraocular Movements: Extraocular movements intact.   Cardiovascular:      Rate and Rhythm: Normal rate.   Pulmonary:      Effort: Pulmonary effort is normal. No respiratory distress.   Abdominal:      Comments: gravid   Neurological:      Mental Status: She is alert.   Skin:     General: Skin is warm and dry.   Psychiatric:         Mood and Affect: Mood normal.         Behavior: Behavior normal.   Vitals reviewed. Exam conducted with a chaperone present.            FHT:  Tracing cat 1 with baseline of 145 bpm with moderate variability with presence of 15x15 accelerations, and no decelerations    TOCO:   No ctx    Lab Results   Component Value Date    WBC 12.55 (H) 04/29/2025    HGB 12.4 04/29/2025    HCT 37.8 04/29/2025     04/29/2025     Lab Results   Component Value Date    K 3.7 04/20/2025     04/20/2025    CO2 23 04/20/2025    BUN 10 04/20/2025    CREATININE 0.53 (L) 04/20/2025    AST 18 04/20/2025    ALT 15 04/20/2025     Prenatal Labs: Reviewed      Blood type: O pos  Antibody: neg  GBS: neg  HIV: neg  Rubella: non-immune  Syphilis IgM/IgG: neg  HBsAg: neg  HCAb: neg  Chlamydia: neg  Gonorrhea: neg  Diabetes 1 hour screen: 164  3 hour glucose: 90, 202, 189, 158 (3 of 4 elevated)  Platelets: 316  Hgb: 13.5  >2 Midnights  INPATIENT     Signature/Title: Mimi Reece MD  Date: 4/29/2025  Time: 6:24 PM

## 2025-04-29 NOTE — PROGRESS NOTES
"Diet controlled gestational diabetes mellitus (GDM) in third trimester  Following with DP  Macrosomia with EFW 89% 3489 g on  AC>99%  I do think based on plan for IOL  an attempt at IOL and  is reasonable. She was counseled on shoulder dystocia today.   No results found for: \"HGBA1C\"    Decreased fetal movement during pregnancy in third trimester, antepartum  Chronic decreased fetal movement. Recommendation  was for IOL. I recommend she be an add on IOL today for this indication. She is /soft. She had previously hoped for a lower intervention birth which she now knows is not possible. She is aware of plan for craig balloon +/- cytotec. She is interested in avoiding pitocin but is aware that is likely not feasible since she is now an induction and is willing to accept if needed. Spec exam today negative for ROM. Her birth plan was reviewed, edited to reflect her current care needs and signed by both her and I.    37 weeks gestation of pregnancy  36 yo  at 37+3 here for routine ob visit. She denies regular contractions. No bleeding. Reports continued decreased fetal movement. Recommend she present to triage for add on IOL.     "

## 2025-04-29 NOTE — ASSESSMENT & PLAN NOTE
EFW 89%, AC > 99% @36w6d   Counseled on shoulder dystocia at prenatal appointment 4/29 prior to arrival for IOL

## 2025-04-29 NOTE — OB LABOR/OXYTOCIN SAFETY PROGRESS
Labor Progress Note - Araceli Levy 35 y.o. female MRN: [id number]    Unit/Bed#: LD TRIAGE 4-01 Encounter: [id number]                Cervical Dilation: 1        Cervical Effacement: 70  Fetal Station: -3     Fetal Heart Rate (FHT): 141 BPM  FHR Category: I               Vital Signs:   Vitals:    04/29/25 1712   BP: 105/52   Pulse: 96   Resp: 20   Temp: 98.2 °F (36.8 °C)       Notes/comments:   SVE as above.     PROCEDURE:  CRAIG BALLOON PLACEMENT    A 24F craig with a 30cc balloon was selected, a SVE was performed and the cervix was located. A craig balloon was introduced over sterile gloved hands. Balloon advanced through cervix beyond the internal cervical os. A small amount amount of sterile saline solution was instilled in the balloon to confirm placement. Placement was confirmed to be beyond the internal cervical os. A total of 60cc of sterile saline solution was placed into the balloon. Pt tolerated well. Instructions left with RN to place craig to gravity with a 1L bag of IV fluid. Notify DO/MD when craig dislodged.    Vaginal cytotec placed.     D/w Dr. Lori Reece MD 4/29/2025 6:59 PM

## 2025-04-29 NOTE — PROGRESS NOTES
Patient presents for a routine prenatal visit    37W3D  Decreased FM and MFM recommending IOL to be facilitated due to persistent decreased FM.  Had a small amount of fluid loss yesterday but nothing since. Lost mucous plug. Had cramping yesterday and today.  Denies bleeding.  Delivery consent is signed.  Declined Tdap vaccine.  GBS neg

## 2025-04-29 NOTE — ASSESSMENT & PLAN NOTE
"Following with DP  Macrosomia with EFW 89% 3489 g on 4/25 AC>99%  No results found for: \"HGBA1C\"  "

## 2025-04-29 NOTE — ASSESSMENT & PLAN NOTE
CBC/CMP wnl, P/C 0.3  Monitor BP's and signs/symptoms of worsening preeclampsia  Consider oral anti-hypertensives for persistently elevated BP's    Systolic (12hrs), Av , Min:120 , Max:139   Diastolic (12hrs), Av, Min:60, Max:87

## 2025-04-29 NOTE — ASSESSMENT & PLAN NOTE
Chronic decreased fetal movement. Recommendation 4/28 was for IOL. I recommend she be an add on IOL today for this indication. She is 1/70/soft. She had previously hoped for a lower intervention birth which she now knows is not possible. She is aware of plan for craig balloon +/- cytotec. She is interested in avoiding pitocin but is aware that is likely not feasible since she is now an induction and is willing to accept if needed. Spec exam today negative for ROM. Her birth plan was reviewed, edited to reflect her current care needs and signed by both her and I.

## 2025-04-30 ENCOUNTER — ANESTHESIA EVENT (INPATIENT)
Dept: LABOR AND DELIVERY | Facility: HOSPITAL | Age: 35
DRG: 540 | End: 2025-04-30
Payer: COMMERCIAL

## 2025-04-30 LAB
GLUCOSE SERPL-MCNC: 104 MG/DL (ref 65–140)
GLUCOSE SERPL-MCNC: 104 MG/DL (ref 65–140)
GLUCOSE SERPL-MCNC: 109 MG/DL (ref 65–140)
GLUCOSE SERPL-MCNC: 111 MG/DL (ref 65–140)
GLUCOSE SERPL-MCNC: 111 MG/DL (ref 65–140)
GLUCOSE SERPL-MCNC: 72 MG/DL (ref 65–140)
GLUCOSE SERPL-MCNC: 73 MG/DL (ref 65–140)
GLUCOSE SERPL-MCNC: 82 MG/DL (ref 65–140)
GLUCOSE SERPL-MCNC: 87 MG/DL (ref 65–140)
GLUCOSE SERPL-MCNC: 91 MG/DL (ref 65–140)
GLUCOSE SERPL-MCNC: 94 MG/DL (ref 65–140)
TREPONEMA PALLIDUM IGG+IGM AB [PRESENCE] IN SERUM OR PLASMA BY IMMUNOASSAY: NORMAL

## 2025-04-30 PROCEDURE — 3E033VJ INTRODUCTION OF OTHER HORMONE INTO PERIPHERAL VEIN, PERCUTANEOUS APPROACH: ICD-10-PCS | Performed by: OBSTETRICS & GYNECOLOGY

## 2025-04-30 PROCEDURE — 82948 REAGENT STRIP/BLOOD GLUCOSE: CPT

## 2025-04-30 PROCEDURE — 10907ZC DRAINAGE OF AMNIOTIC FLUID, THERAPEUTIC FROM PRODUCTS OF CONCEPTION, VIA NATURAL OR ARTIFICIAL OPENING: ICD-10-PCS | Performed by: OBSTETRICS & GYNECOLOGY

## 2025-04-30 PROCEDURE — 10H07YZ INSERTION OF OTHER DEVICE INTO PRODUCTS OF CONCEPTION, VIA NATURAL OR ARTIFICIAL OPENING: ICD-10-PCS | Performed by: OBSTETRICS & GYNECOLOGY

## 2025-04-30 RX ORDER — LIDOCAINE HYDROCHLORIDE AND EPINEPHRINE 15; 5 MG/ML; UG/ML
INJECTION, SOLUTION EPIDURAL
Status: COMPLETED | OUTPATIENT
Start: 2025-04-30 | End: 2025-04-30

## 2025-04-30 RX ORDER — BUTORPHANOL TARTRATE 1 MG/ML
1 INJECTION, SOLUTION INTRAMUSCULAR; INTRAVENOUS ONCE
Status: COMPLETED | OUTPATIENT
Start: 2025-04-30 | End: 2025-04-30

## 2025-04-30 RX ORDER — OXYTOCIN/RINGER'S LACTATE 30/500 ML
1-30 PLASTIC BAG, INJECTION (ML) INTRAVENOUS
Status: DISCONTINUED | OUTPATIENT
Start: 2025-04-30 | End: 2025-05-01

## 2025-04-30 RX ORDER — DEXTROSE MONOHYDRATE AND SODIUM CHLORIDE 5; .9 G/100ML; G/100ML
100 INJECTION, SOLUTION INTRAVENOUS CONTINUOUS
Status: DISCONTINUED | OUTPATIENT
Start: 2025-04-30 | End: 2025-05-01

## 2025-04-30 RX ADMIN — ONDANSETRON 4 MG: 2 INJECTION, SOLUTION INTRAMUSCULAR; INTRAVENOUS at 15:46

## 2025-04-30 RX ADMIN — ONDANSETRON 4 MG: 2 INJECTION, SOLUTION INTRAMUSCULAR; INTRAVENOUS at 23:45

## 2025-04-30 RX ADMIN — ONDANSETRON 4 MG: 2 INJECTION, SOLUTION INTRAMUSCULAR; INTRAVENOUS at 09:46

## 2025-04-30 RX ADMIN — SODIUM CHLORIDE 125 ML/HR: 0.9 INJECTION, SOLUTION INTRAVENOUS at 11:13

## 2025-04-30 RX ADMIN — SODIUM CHLORIDE 125 ML/HR: 0.9 INJECTION, SOLUTION INTRAVENOUS at 00:53

## 2025-04-30 RX ADMIN — ROPIVACAINE HYDROCHLORIDE: 2 INJECTION, SOLUTION EPIDURAL; INFILTRATION at 23:13

## 2025-04-30 RX ADMIN — LIDOCAINE HYDROCHLORIDE AND EPINEPHRINE 2 ML: 15; 5 INJECTION, SOLUTION EPIDURAL at 09:01

## 2025-04-30 RX ADMIN — LIDOCAINE HYDROCHLORIDE AND EPINEPHRINE 3 ML: 15; 5 INJECTION, SOLUTION EPIDURAL at 08:55

## 2025-04-30 RX ADMIN — SODIUM CHLORIDE 125 ML/HR: 0.9 INJECTION, SOLUTION INTRAVENOUS at 19:24

## 2025-04-30 RX ADMIN — Medication 2 MILLI-UNITS/MIN: at 00:53

## 2025-04-30 RX ADMIN — SODIUM CHLORIDE 125 ML/HR: 0.9 INJECTION, SOLUTION INTRAVENOUS at 09:34

## 2025-04-30 RX ADMIN — ROPIVACAINE HYDROCHLORIDE: 2 INJECTION, SOLUTION EPIDURAL; INFILTRATION at 15:46

## 2025-04-30 RX ADMIN — SODIUM CHLORIDE 999 ML/HR: 0.9 INJECTION, SOLUTION INTRAVENOUS at 08:31

## 2025-04-30 RX ADMIN — BUTORPHANOL TARTRATE 1 MG: 1 INJECTION, SOLUTION INTRAMUSCULAR; INTRAVENOUS at 02:27

## 2025-04-30 RX ADMIN — ROPIVACAINE HYDROCHLORIDE: 2 INJECTION, SOLUTION EPIDURAL; INFILTRATION at 09:00

## 2025-04-30 NOTE — OB LABOR/OXYTOCIN SAFETY PROGRESS
Oxytocin Safety Progress Check Note - Araceli Levy 35 y.o. female MRN: [id number]    Unit/Bed#: -01 Encounter: [id number]    Dose (rose marie-units/min) Oxytocin: 10 rose marie-units/min  Contraction Frequency (minutes): 5-7  Contraction Intensity: Mild/Moderate  Uterine Activity Characteristics: Irregular  Cervical Dilation: 3        Cervical Effacement: 80  Fetal Station: -2  Baseline Rate (FHR): 140 bpm  Fetal Heart Rate (FHT): 140 BPM  FHR Category: 1  Oxytocin Safety Progress Check: Safety check completed            Vital Signs:   Vitals:    04/30/25 1020   BP: 99/56   Pulse: 86   Resp:    Temp:    SpO2:        Notes/comments:   -AROM clear fluid   -continue pitocin  -recheck prn      Sade Nolasco DO 4/30/2025 11:11 AM

## 2025-04-30 NOTE — OB LABOR/OXYTOCIN SAFETY PROGRESS
Oxytocin Safety Progress Check Note - Araceli Levy 35 y.o. female MRN: [id number]    Unit/Bed#: -01 Encounter: [id number]    Dose (rose marie-units/min) Oxytocin: 4 rose marie-units/min  Contraction Frequency (minutes): diff to determine, side lying  Contraction Intensity: Mild/Moderate  Uterine Activity Characteristics: Irregular  Cervical Dilation: 3        Cervical Effacement: 80  Fetal Station: -2  Baseline Rate (FHR): 125 bpm  Fetal Heart Rate (FHT): 137 BPM  FHR Category: I               Vital Signs:   Vitals:    04/30/25 0400   BP: 134/69   Pulse: 72   Resp: 18   Temp: 98 °F (36.7 °C)       Notes/comments:   SVE as above. FHT Cat I. Consider AROM with next check if fetal head well applied. Dr. Sandoval aware.     Mimi Reece MD 4/30/2025 4:40 AM

## 2025-04-30 NOTE — PLAN OF CARE
Problem: BIRTH - VAGINAL/ SECTION  Goal: Fetal and maternal status remain reassuring during the birth process  Description: INTERVENTIONS:- Monitor vital signs- Monitor fetal heart rate- Monitor uterine activity- Monitor labor progression (vaginal delivery)- DVT prophylaxis- Antibiotic prophylaxis  Outcome: Progressing  Goal: Emotionally satisfying birthing experience for mother/fetus  Description: Interventions:- Assess, plan, implement and evaluate the nursing care given to the patient in labor- Advocate the philosophy that each childbirth experience is a unique experience and support the family's chosen level of involvement and control during the labor process - Actively participate in both the patient's and family's teaching of the birth process- Consider cultural, Restorationism and age-specific factors and plan care for the patient in labor  Outcome: Progressing     Problem: PAIN - ADULT  Goal: Verbalizes/displays adequate comfort level or baseline comfort level  Description: Interventions:- Encourage patient to monitor pain and request assistance- Assess pain using appropriate pain scale- Administer analgesics based on type and severity of pain and evaluate response- Implement non-pharmacological measures as appropriate and evaluate response- Consider cultural and social influences on pain and pain management- Notify physician/advanced practitioner if interventions unsuccessful or patient reports new pain  Outcome: Progressing     Problem: INFECTION - ADULT  Goal: Absence or prevention of progression during hospitalization  Description: INTERVENTIONS:- Assess and monitor for signs and symptoms of infection- Monitor lab/diagnostic results- Monitor all insertion sites, i.e. indwelling lines, tubes, and drains- Monitor endotracheal if appropriate and nasal secretions for changes in amount and color- [place] appropriate cooling/warming therapies per order- Administer medications as ordered- Instruct and  encourage patient and family to use good hand hygiene technique- Identify and instruct in appropriate isolation precautions for identified infection/condition  Outcome: Progressing  Goal: Absence of fever/infection during neutropenic period  Description: INTERVENTIONS:- Monitor WBC  Outcome: Progressing     Problem: SAFETY ADULT  Goal: Patient will remain free of falls  Description: INTERVENTIONS:- Educate patient/family on patient safety including physical limitations- Instruct patient to call for assistance with activity - Consult OT/PT to assist with strengthening/mobility - Keep Call bell within reach- Keep bed low and locked with side rails adjusted as appropriate- Keep care items and personal belongings within reach- Initiate and maintain comfort rounds- Make Fall Risk Sign visible to staff  Outcome: Progressing  Goal: Maintain or return to baseline ADL function  Description: INTERVENTIONS:-  Assess patient's ability to carry out ADLs; assess patient's baseline for ADL function and identify physical deficits which impact ability to perform ADLs (bathing, care of mouth/teeth, toileting, grooming, dressing, etc.)- Assess/evaluate cause of self-care deficits - Assess range of motion- Assess patient's mobility; develop plan if impaired- Assess patient's need for assistive devices and provide as appropriate- Encourage maximum independence but intervene and supervise when necessary- Involve family in performance of ADLs- Assess for home care needs following discharge - Consider OT consult to assist with ADL evaluation and planning for discharge- Provide patient education as appropriate  Outcome: Progressing  Goal: Maintains/Returns to pre admission functional level  Description: INTERVENTIONS:- Perform AM-PAC 6 Click Basic Mobility/ Daily Activity assessment daily.- Set and communicate daily mobility goal to care team and patient/family/caregiver. - Collaborate with rehabilitation services on mobility goals if  consulted- Perform Range of Motion  Outcome: Progressing     Problem: Knowledge Deficit  Goal: Patient/family/caregiver demonstrates understanding of disease process, treatment plan, medications, and discharge instructions  Description: Complete learning assessment and assess knowledge base.Interventions:- Provide teaching at level of understanding- Provide teaching via preferred learning methods  Outcome: Progressing     Problem: DISCHARGE PLANNING  Goal: Discharge to home or other facility with appropriate resources  Description: INTERVENTIONS:- Identify barriers to discharge w/patient and caregiver- Arrange for needed discharge resources and transportation as appropriate- Identify discharge learning needs (meds, wound care, etc.)- Arrange for interpretive services to assist at discharge as needed- Refer to Case Management Department for coordinating discharge planning if the patient needs post-hospital services based on physician/advanced practitioner order or complex needs related to functional status, cognitive ability, or social support system  Outcome: Progressing

## 2025-04-30 NOTE — ANESTHESIA PREPROCEDURE EVALUATION
"Procedure:  LABOR ANALGESIA    Relevant Problems   CARDIO   (+) Refractory migraine without aura      GI/HEPATIC   (+) Fatty liver      GYN   (+) 37 weeks gestation of pregnancy   (+) Supervision of normal pregnancy      NEURO/PSYCH   (+) Panic disorder with agoraphobia   (+) Refractory migraine without aura      PULMONARY   (+) Shortness of breath      Obstetrics/Gynecology   (+) Diet controlled gestational diabetes mellitus (GDM) in third trimester   (+) Macrosomia affecting management of mother in third trimester   (+) Severe obesity due to excess calories affecting pregnancy in third trimester (HCC)      Other   (+) Elevated blood pressure reading without diagnosis of hypertension        Physical Exam    Airway    Mallampati score: II  TM Distance: >3 FB  Neck ROM: full     Dental   No notable dental hx     Cardiovascular  Cardiovascular exam normal    Pulmonary  Pulmonary exam normal     Other Findings  post-pubertal.      Anesthesia Plan  ASA Score- 3     Anesthesia Type- epidural with ASA Monitors.         Additional Monitors:     Airway Plan:            Plan Factors-Exercise tolerance (METS): >4 METS.    Chart reviewed.   Existing labs reviewed. Patient summary reviewed.    Patient is not a current smoker. Patient not instructed to abstain from smoking on day of procedure. Patient did not smoke on day of surgery.            Induction-     Postoperative Plan-     Perioperative Resuscitation Plan - Level 1 - Full Code.       Informed Consent- Anesthetic plan and risks discussed with patient and spouse.  I personally reviewed this patient with the CRNA. Discussed and agreed on the Anesthesia Plan with the CRNA..      NPO Status:  Vitals Value Taken Time   Date of last liquid 04/29/25 04/29/25 1708   Time of last liquid     Date of last solid 04/29/25 04/29/25 1708   Time of last solid 1340 04/29/25 1708       No results found for: \"HGBA1C\"    Lab Results   Component Value Date    K 3.7 04/20/2025     " 04/20/2025    CO2 23 04/20/2025    BUN 10 04/20/2025    CREATININE 0.53 (L) 04/20/2025    GLUF 89 01/03/2025    GLUF 90 01/03/2025    CALCIUM 8.6 04/20/2025    AST 18 04/20/2025    ALT 15 04/20/2025    ALKPHOS 81 04/20/2025    EGFR 124 04/20/2025       Lab Results   Component Value Date    WBC 12.55 (H) 04/29/2025    HGB 12.4 04/29/2025    HCT 37.8 04/29/2025    MCV 80 (L) 04/29/2025     04/29/2025     eft Ventricle: Left ventricular cavity size is normal. Wall thickness is normal. The left ventricular ejection fraction is 65%. Systolic function is normal. Wall motion is normal. Diastolic function is normal.  Left atrial filling pressure is normal.

## 2025-04-30 NOTE — PLAN OF CARE
Problem: BIRTH - VAGINAL/ SECTION  Goal: Fetal and maternal status remain reassuring during the birth process  Description: INTERVENTIONS:- Monitor vital signs- Monitor fetal heart rate- Monitor uterine activity- Monitor labor progression (vaginal delivery)- DVT prophylaxis- Antibiotic prophylaxis  Outcome: Progressing  Goal: Emotionally satisfying birthing experience for mother/fetus  Description: Interventions:- Assess, plan, implement and evaluate the nursing care given to the patient in labor- Advocate the philosophy that each childbirth experience is a unique experience and support the family's chosen level of involvement and control during the labor process - Actively participate in both the patient's and family's teaching of the birth process- Consider cultural, Pentecostal and age-specific factors and plan care for the patient in labor  Outcome: Progressing

## 2025-04-30 NOTE — OB LABOR/OXYTOCIN SAFETY PROGRESS
Oxytocin Safety Progress Check Note - Araceli Levy 35 y.o. female MRN: [id number]    Unit/Bed#: -01 Encounter: [id number]    Dose (rose marie-units/min) Oxytocin: 16 rose marie-units/min  Contraction Frequency (minutes): 1-4  Contraction Intensity: Moderate  Uterine Activity Characteristics: Coupling  Cervical Dilation: 3        Cervical Effacement: 80  Fetal Station: -1  Baseline Rate (FHR): 135 bpm  Fetal Heart Rate (FHT): 152 BPM  FHR Category: I  Oxytocin Safety Progress Check: Safety check completed            Vital Signs:   Vitals:    04/30/25 1843   BP: 123/53   Pulse: 90   Resp:    Temp:    SpO2:        Notes/comments:   SVE as above, unchanged from prior. IUPC placed for better contraction monitoring. Plan to titrate pitocin for adequate MVUs. FHT Cat I. Dr. Newman aware.      Mimi Reece MD 4/30/2025 7:12 PM

## 2025-04-30 NOTE — OB LABOR/OXYTOCIN SAFETY PROGRESS
Oxytocin Safety Progress Check Note - Araceli Levy 35 y.o. female MRN: [id number]    Unit/Bed#: -01 Encounter: [id number]    Dose (rose marie-units/min) Oxytocin: 4 rose marie-units/min  Contraction Frequency (minutes): diff to determine, side lying  Contraction Intensity: Mild/Moderate  Uterine Activity Characteristics: Irregular  Cervical Dilation: 3        Cervical Effacement: 80  Fetal Station: -2  Baseline Rate (FHR): 130 bpm  Fetal Heart Rate (FHT): 136 BPM  FHR Category: I               Vital Signs:   Vitals:    04/30/25 0330   BP: 113/73   Pulse: 74   Resp:    Temp:        Notes/comments:   SVE deferred, FHT Cat I. Continue pitocin titration.     Mimi Reece MD 4/30/2025 3:59 AM

## 2025-04-30 NOTE — PLAN OF CARE
Problem: BIRTH - VAGINAL/ SECTION  Goal: Fetal and maternal status remain reassuring during the birth process  Description: INTERVENTIONS:- Monitor vital signs- Monitor fetal heart rate- Monitor uterine activity- Monitor labor progression (vaginal delivery)- DVT prophylaxis- Antibiotic prophylaxis  Outcome: Progressing  Goal: Emotionally satisfying birthing experience for mother/fetus  Description: Interventions:- Assess, plan, implement and evaluate the nursing care given to the patient in labor- Advocate the philosophy that each childbirth experience is a unique experience and support the family's chosen level of involvement and control during the labor process - Actively participate in both the patient's and family's teaching of the birth process- Consider cultural, Bahai and age-specific factors and plan care for the patient in labor  Outcome: Progressing     Problem: PAIN - ADULT  Goal: Verbalizes/displays adequate comfort level or baseline comfort level  Description: Interventions:- Encourage patient to monitor pain and request assistance- Assess pain using appropriate pain scale- Administer analgesics based on type and severity of pain and evaluate response- Implement non-pharmacological measures as appropriate and evaluate response- Consider cultural and social influences on pain and pain management- Notify physician/advanced practitioner if interventions unsuccessful or patient reports new pain  Outcome: Progressing     Problem: INFECTION - ADULT  Goal: Absence or prevention of progression during hospitalization  Description: INTERVENTIONS:- Assess and monitor for signs and symptoms of infection- Monitor lab/diagnostic results- Monitor all insertion sites, i.e. indwelling lines, tubes, and drains- Monitor endotracheal if appropriate and nasal secretions for changes in amount and color- [place] appropriate cooling/warming therapies per order- Administer medications as ordered- Instruct and  encourage patient and family to use good hand hygiene technique- Identify and instruct in appropriate isolation precautions for identified infection/condition  Outcome: Progressing  Goal: Absence of fever/infection during neutropenic period  Description: INTERVENTIONS:- Monitor WBC  Outcome: Progressing     Problem: SAFETY ADULT  Goal: Patient will remain free of falls  Description: INTERVENTIONS:- Educate patient/family on patient safety including physical limitations- Instruct patient to call for assistance with activity - Consult OT/PT to assist with strengthening/mobility - Keep Call bell within reach- Keep bed low and locked with side rails adjusted as appropriate- Keep care items and personal belongings within reach- Initiate and maintain comfort rounds- Make Fall Risk Sign visible to staff  Outcome: Progressing  Goal: Maintain or return to baseline ADL function  Description: INTERVENTIONS:-  Assess patient's ability to carry out ADLs; assess patient's baseline for ADL function and identify physical deficits which impact ability to perform ADLs (bathing, care of mouth/teeth, toileting, grooming, dressing, etc.)- Assess/evaluate cause of self-care deficits - Assess range of motion- Assess patient's mobility; develop plan if impaired- Assess patient's need for assistive devices and provide as appropriate- Encourage maximum independence but intervene and supervise when necessary- Involve family in performance of ADLs- Assess for home care needs following discharge - Consider OT consult to assist with ADL evaluation and planning for discharge- Provide patient education as appropriate  Outcome: Progressing  Goal: Maintains/Returns to pre admission functional level  Description: INTERVENTIONS:- Perform AM-PAC 6 Click Basic Mobility/ Daily Activity assessment daily.- Set and communicate daily mobility goal to care team and patient/family/caregiver. - Collaborate with rehabilitation services on mobility goals if  consulted- Perform Range of Motion  Outcome: Progressing     Problem: Knowledge Deficit  Goal: Patient/family/caregiver demonstrates understanding of disease process, treatment plan, medications, and discharge instructions  Description: Complete learning assessment and assess knowledge base.Interventions:- Provide teaching at level of understanding- Provide teaching via preferred learning methods  Outcome: Progressing     Problem: DISCHARGE PLANNING  Goal: Discharge to home or other facility with appropriate resources  Description: INTERVENTIONS:- Identify barriers to discharge w/patient and caregiver- Arrange for needed discharge resources and transportation as appropriate- Identify discharge learning needs (meds, wound care, etc.)- Arrange for interpretive services to assist at discharge as needed- Refer to Case Management Department for coordinating discharge planning if the patient needs post-hospital services based on physician/advanced practitioner order or complex needs related to functional status, cognitive ability, or social support system  Outcome: Progressing

## 2025-04-30 NOTE — ANESTHESIA PROCEDURE NOTES
Epidural Block    Start time: 4/30/2025 8:30 AM  Reason for block: procedure for pain  Staffing  Performed by: Kristie Metcalf MD  Authorized by: Kristie Metcalf MD    Preanesthetic Checklist  Completed: patient identified, IV checked, site marked, risks and benefits discussed, surgical consent, monitors and equipment checked, pre-op evaluation and timeout performed  Epidural  Patient position: sitting  Prep: ChloraPrep  Sedation Level: no sedation  Patient monitoring: frequent blood pressure checks, continuous pulse oximetry and heart rate  Approach: midline  Location: lumbar, L3-4  Injection technique: ALISTAIR saline  Needle  Needle type: Tuohy   Needle gauge: 17 G  Needle insertion depth: 8 cm  Catheter type: multi-orifice  Catheter size: 20 G  Catheter at skin depth: 13 cm  Catheter securement method: stabilization device and clear occlusive dressing  Test dose: negativelidocaine-epinephrine (XYLOCAINE-MPF/EPINEPHRINE) 1.5 %-1:200,000 injection 3 mL - Epidural   3 mL - 4/30/2025 8:55:00 AM  Assessment  Sensory level: T10  Number of attempts: 1negative aspiration for CSF, negative aspiration for heme and no paresthesia on injection  patient tolerated the procedure well with no immediate complications

## 2025-04-30 NOTE — OB LABOR/OXYTOCIN SAFETY PROGRESS
Oxytocin Safety Progress Check Note - Araceli Levy 35 y.o. female MRN: [id number]    Unit/Bed#: -01 Encounter: [id number]    Dose (rose marie-units/min) Oxytocin: 12 rose marie-units/min  Contraction Frequency (minutes): 3-4  Contraction Intensity: Mild/Moderate  Uterine Activity Characteristics: Regular  Cervical Dilation: 3        Cervical Effacement: 80  Fetal Station: -1  Baseline Rate (FHR): 135 bpm  Fetal Heart Rate (FHT): 135 BPM  FHR Category: 1  Oxytocin Safety Progress Check: Safety check completed            Vital Signs:   Vitals:    04/30/25 1329   BP: 121/62   Pulse: 83   Resp:    Temp:    SpO2:        Notes/comments:   -continue pitocin   -recheck prn      Sade Nolasco DO 4/30/2025 2:28 PM

## 2025-04-30 NOTE — OB LABOR/OXYTOCIN SAFETY PROGRESS
Oxytocin Safety Progress Check Note - Araceli Levy 35 y.o. female MRN: [id number]    Unit/Bed#: -01 Encounter: [id number]       Contraction Frequency (minutes): 0  Contraction Intensity: Mild  Uterine Activity Characteristics: Occasional  Cervical Dilation: 3        Cervical Effacement: 80  Fetal Station: -2  Baseline Rate (FHR): 135 bpm  Fetal Heart Rate (FHT): 130 BPM  FHR Category: I               Vital Signs:   Vitals:    04/29/25 1900   BP: 111/55   Pulse:    Resp:    Temp:        Notes/comments:   Bell balloon out, SVE as above. FHT Cat I. Will plan to start pitocin. Dr. Sandoval aware.      Mimi Reece MD 4/30/2025 12:24 AM

## 2025-05-01 PROBLEM — Z98.891 STATUS POST PRIMARY LOW TRANSVERSE CESAREAN SECTION: Status: ACTIVE | Noted: 2025-05-01

## 2025-05-01 LAB
ALBUMIN SERPL BCG-MCNC: 3.3 G/DL (ref 3.5–5)
ALP SERPL-CCNC: 87 U/L (ref 34–104)
ALT SERPL W P-5'-P-CCNC: 14 U/L (ref 7–52)
ANION GAP SERPL CALCULATED.3IONS-SCNC: 9 MMOL/L (ref 4–13)
AST SERPL W P-5'-P-CCNC: 20 U/L (ref 13–39)
BASE EXCESS BLDCOA CALC-SCNC: -3.4 MMOL/L (ref 3–11)
BASE EXCESS BLDCOV CALC-SCNC: -3.7 MMOL/L (ref 1–9)
BILIRUB SERPL-MCNC: 0.52 MG/DL (ref 0.2–1)
BUN SERPL-MCNC: 9 MG/DL (ref 5–25)
CALCIUM ALBUM COR SERPL-MCNC: 9.2 MG/DL (ref 8.3–10.1)
CALCIUM SERPL-MCNC: 8.6 MG/DL (ref 8.4–10.2)
CHLORIDE SERPL-SCNC: 106 MMOL/L (ref 96–108)
CO2 SERPL-SCNC: 20 MMOL/L (ref 21–32)
CREAT SERPL-MCNC: 0.66 MG/DL (ref 0.6–1.3)
CREAT UR-MCNC: 53.6 MG/DL
GFR SERPL CREATININE-BSD FRML MDRD: 115 ML/MIN/1.73SQ M
GLUCOSE SERPL-MCNC: 100 MG/DL (ref 65–140)
GLUCOSE SERPL-MCNC: 80 MG/DL (ref 65–140)
GLUCOSE SERPL-MCNC: 82 MG/DL (ref 65–140)
GLUCOSE SERPL-MCNC: 85 MG/DL (ref 65–140)
GLUCOSE SERPL-MCNC: 86 MG/DL (ref 65–140)
GLUCOSE SERPL-MCNC: 87 MG/DL (ref 65–140)
GLUCOSE SERPL-MCNC: 89 MG/DL (ref 65–140)
GLUCOSE SERPL-MCNC: 94 MG/DL (ref 65–140)
GLUCOSE SERPL-MCNC: 95 MG/DL (ref 65–140)
HCO3 BLDCOA-SCNC: 23.8 MMOL/L (ref 17.3–27.3)
HCO3 BLDCOV-SCNC: 22.2 MMOL/L (ref 12.2–28.6)
O2 CT VFR BLDCOA CALC: 8.6 ML/DL
OXYHGB MFR BLDCOA: 41.5 %
OXYHGB MFR BLDCOV: 54.2 %
PCO2 BLDCOA: 51.1 MM[HG] (ref 30–60)
PCO2 BLDCOV: 43 MM HG (ref 27–43)
PH BLDCOA: 7.29 [PH] (ref 7.23–7.43)
PH BLDCOV: 7.33 [PH] (ref 7.19–7.49)
PO2 BLDCOA: 18.8 MM HG (ref 5–25)
PO2 BLDCOV: 22.3 MM HG (ref 15–45)
POTASSIUM SERPL-SCNC: 3.5 MMOL/L (ref 3.5–5.3)
PROT SERPL-MCNC: 6.6 G/DL (ref 6.4–8.4)
PROT UR-MCNC: 17.6 MG/DL
PROT/CREAT UR: 0.3 MG/G{CREAT}
SAO2 % BLDCOV: 10.8 ML/DL
SODIUM SERPL-SCNC: 135 MMOL/L (ref 135–147)

## 2025-05-01 PROCEDURE — 0UQ90ZZ REPAIR UTERUS, OPEN APPROACH: ICD-10-PCS | Performed by: OBSTETRICS & GYNECOLOGY

## 2025-05-01 PROCEDURE — 10H073Z INSERTION OF MONITORING ELECTRODE INTO PRODUCTS OF CONCEPTION, VIA NATURAL OR ARTIFICIAL OPENING: ICD-10-PCS | Performed by: OBSTETRICS & GYNECOLOGY

## 2025-05-01 PROCEDURE — 80053 COMPREHEN METABOLIC PANEL: CPT

## 2025-05-01 PROCEDURE — 82570 ASSAY OF URINE CREATININE: CPT

## 2025-05-01 PROCEDURE — 59514 CESAREAN DELIVERY ONLY: CPT | Performed by: OBSTETRICS & GYNECOLOGY

## 2025-05-01 PROCEDURE — 82948 REAGENT STRIP/BLOOD GLUCOSE: CPT

## 2025-05-01 PROCEDURE — 88307 TISSUE EXAM BY PATHOLOGIST: CPT | Performed by: PATHOLOGY

## 2025-05-01 PROCEDURE — 4A1H7CZ MONITORING OF PRODUCTS OF CONCEPTION, CARDIAC RATE, VIA NATURAL OR ARTIFICIAL OPENING: ICD-10-PCS | Performed by: OBSTETRICS & GYNECOLOGY

## 2025-05-01 PROCEDURE — 84156 ASSAY OF PROTEIN URINE: CPT

## 2025-05-01 PROCEDURE — 82805 BLOOD GASES W/O2 SATURATION: CPT | Performed by: OBSTETRICS & GYNECOLOGY

## 2025-05-01 RX ORDER — DIPHENHYDRAMINE HCL 25 MG
25 TABLET ORAL EVERY 6 HOURS PRN
Status: DISCONTINUED | OUTPATIENT
Start: 2025-05-01 | End: 2025-05-04 | Stop reason: HOSPADM

## 2025-05-01 RX ORDER — SODIUM CHLORIDE, SODIUM LACTATE, POTASSIUM CHLORIDE, CALCIUM CHLORIDE 600; 310; 30; 20 MG/100ML; MG/100ML; MG/100ML; MG/100ML
INJECTION, SOLUTION INTRAVENOUS CONTINUOUS PRN
Status: DISCONTINUED | OUTPATIENT
Start: 2025-05-01 | End: 2025-05-01

## 2025-05-01 RX ORDER — CEFAZOLIN SODIUM 3 G/50ML
3000 SOLUTION INTRAVENOUS ONCE
Status: DISCONTINUED | OUTPATIENT
Start: 2025-05-01 | End: 2025-05-01

## 2025-05-01 RX ORDER — OXYTOCIN/RINGER'S LACTATE 30/500 ML
62.5 PLASTIC BAG, INJECTION (ML) INTRAVENOUS ONCE
Status: COMPLETED | OUTPATIENT
Start: 2025-05-01 | End: 2025-05-01

## 2025-05-01 RX ORDER — FENTANYL CITRATE/PF 50 MCG/ML
50 SYRINGE (ML) INJECTION
Status: DISCONTINUED | OUTPATIENT
Start: 2025-05-01 | End: 2025-05-01

## 2025-05-01 RX ORDER — ONDANSETRON 2 MG/ML
4 INJECTION INTRAMUSCULAR; INTRAVENOUS ONCE AS NEEDED
Status: DISCONTINUED | OUTPATIENT
Start: 2025-05-01 | End: 2025-05-04 | Stop reason: HOSPADM

## 2025-05-01 RX ORDER — NALOXONE HYDROCHLORIDE 0.4 MG/ML
0.1 INJECTION, SOLUTION INTRAMUSCULAR; INTRAVENOUS; SUBCUTANEOUS
Status: ACTIVE | OUTPATIENT
Start: 2025-05-01 | End: 2025-05-02

## 2025-05-01 RX ORDER — SODIUM CHLORIDE, SODIUM LACTATE, POTASSIUM CHLORIDE, CALCIUM CHLORIDE 600; 310; 30; 20 MG/100ML; MG/100ML; MG/100ML; MG/100ML
20 INJECTION, SOLUTION INTRAVENOUS CONTINUOUS
Status: CANCELLED | OUTPATIENT
Start: 2025-05-01

## 2025-05-01 RX ORDER — OXYCODONE HYDROCHLORIDE 10 MG/1
10 TABLET ORAL EVERY 4 HOURS PRN
Refills: 0 | Status: DISCONTINUED | OUTPATIENT
Start: 2025-05-01 | End: 2025-05-04 | Stop reason: HOSPADM

## 2025-05-01 RX ORDER — ENOXAPARIN SODIUM 100 MG/ML
40 INJECTION SUBCUTANEOUS EVERY 12 HOURS SCHEDULED
Status: DISCONTINUED | OUTPATIENT
Start: 2025-05-02 | End: 2025-05-04 | Stop reason: HOSPADM

## 2025-05-01 RX ORDER — DOCUSATE SODIUM 100 MG/1
100 CAPSULE, LIQUID FILLED ORAL 2 TIMES DAILY
Status: DISCONTINUED | OUTPATIENT
Start: 2025-05-01 | End: 2025-05-04 | Stop reason: HOSPADM

## 2025-05-01 RX ORDER — NALBUPHINE HYDROCHLORIDE 10 MG/ML
5 INJECTION INTRAMUSCULAR; INTRAVENOUS; SUBCUTANEOUS
Status: DISPENSED | OUTPATIENT
Start: 2025-05-01 | End: 2025-05-02

## 2025-05-01 RX ORDER — DIPHENHYDRAMINE HYDROCHLORIDE 50 MG/ML
INJECTION, SOLUTION INTRAMUSCULAR; INTRAVENOUS AS NEEDED
Status: DISCONTINUED | OUTPATIENT
Start: 2025-05-01 | End: 2025-05-01

## 2025-05-01 RX ORDER — IBUPROFEN 600 MG/1
600 TABLET, FILM COATED ORAL EVERY 6 HOURS
Status: DISCONTINUED | OUTPATIENT
Start: 2025-05-03 | End: 2025-05-04 | Stop reason: HOSPADM

## 2025-05-01 RX ORDER — BENZOCAINE/MENTHOL 6 MG-10 MG
1 LOZENGE MUCOUS MEMBRANE DAILY PRN
Status: DISCONTINUED | OUTPATIENT
Start: 2025-05-01 | End: 2025-05-04 | Stop reason: HOSPADM

## 2025-05-01 RX ORDER — MORPHINE SULFATE 0.5 MG/ML
INJECTION, SOLUTION EPIDURAL; INTRATHECAL; INTRAVENOUS AS NEEDED
Status: DISCONTINUED | OUTPATIENT
Start: 2025-05-01 | End: 2025-05-01

## 2025-05-01 RX ORDER — BUPIVACAINE HYDROCHLORIDE 5 MG/ML
INJECTION, SOLUTION EPIDURAL; INTRACAUDAL; PERINEURAL AS NEEDED
Status: DISCONTINUED | OUTPATIENT
Start: 2025-05-01 | End: 2025-05-01

## 2025-05-01 RX ORDER — LIDOCAINE HYDROCHLORIDE AND EPINEPHRINE 20; 5 MG/ML; UG/ML
INJECTION, SOLUTION EPIDURAL; INFILTRATION; INTRACAUDAL; PERINEURAL AS NEEDED
Status: DISCONTINUED | OUTPATIENT
Start: 2025-05-01 | End: 2025-05-01

## 2025-05-01 RX ORDER — BUPIVACAINE HYDROCHLORIDE 2.5 MG/ML
INJECTION, SOLUTION EPIDURAL; INFILTRATION; INTRACAUDAL; PERINEURAL AS NEEDED
Status: DISCONTINUED | OUTPATIENT
Start: 2025-05-01 | End: 2025-05-01

## 2025-05-01 RX ORDER — ONDANSETRON 2 MG/ML
4 INJECTION INTRAMUSCULAR; INTRAVENOUS EVERY 8 HOURS PRN
Status: DISCONTINUED | OUTPATIENT
Start: 2025-05-01 | End: 2025-05-04 | Stop reason: HOSPADM

## 2025-05-01 RX ORDER — ONDANSETRON 2 MG/ML
INJECTION INTRAMUSCULAR; INTRAVENOUS AS NEEDED
Status: DISCONTINUED | OUTPATIENT
Start: 2025-05-01 | End: 2025-05-01

## 2025-05-01 RX ORDER — CALCIUM CARBONATE 500 MG/1
1000 TABLET, CHEWABLE ORAL DAILY PRN
Status: DISCONTINUED | OUTPATIENT
Start: 2025-05-01 | End: 2025-05-04 | Stop reason: HOSPADM

## 2025-05-01 RX ORDER — METRONIDAZOLE 500 MG/1
500 TABLET ORAL EVERY 8 HOURS SCHEDULED
Status: COMPLETED | OUTPATIENT
Start: 2025-05-01 | End: 2025-05-03

## 2025-05-01 RX ORDER — METOCLOPRAMIDE HYDROCHLORIDE 5 MG/ML
INJECTION INTRAMUSCULAR; INTRAVENOUS AS NEEDED
Status: DISCONTINUED | OUTPATIENT
Start: 2025-05-01 | End: 2025-05-01

## 2025-05-01 RX ORDER — OXYCODONE HYDROCHLORIDE 5 MG/1
5 TABLET ORAL EVERY 4 HOURS PRN
Refills: 0 | Status: DISCONTINUED | OUTPATIENT
Start: 2025-05-01 | End: 2025-05-04 | Stop reason: HOSPADM

## 2025-05-01 RX ORDER — DEXAMETHASONE SODIUM PHOSPHATE 10 MG/ML
INJECTION, SOLUTION INTRAMUSCULAR; INTRAVENOUS AS NEEDED
Status: DISCONTINUED | OUTPATIENT
Start: 2025-05-01 | End: 2025-05-01

## 2025-05-01 RX ORDER — FENTANYL CITRATE 50 UG/ML
INJECTION, SOLUTION INTRAMUSCULAR; INTRAVENOUS AS NEEDED
Status: DISCONTINUED | OUTPATIENT
Start: 2025-05-01 | End: 2025-05-01

## 2025-05-01 RX ORDER — KETOROLAC TROMETHAMINE 30 MG/ML
15 INJECTION, SOLUTION INTRAMUSCULAR; INTRAVENOUS EVERY 6 HOURS SCHEDULED
Status: COMPLETED | OUTPATIENT
Start: 2025-05-01 | End: 2025-05-02

## 2025-05-01 RX ORDER — ACETAMINOPHEN 325 MG/1
650 TABLET ORAL EVERY 6 HOURS SCHEDULED
Status: DISCONTINUED | OUTPATIENT
Start: 2025-05-01 | End: 2025-05-04 | Stop reason: HOSPADM

## 2025-05-01 RX ORDER — KETOROLAC TROMETHAMINE 30 MG/ML
INJECTION, SOLUTION INTRAMUSCULAR; INTRAVENOUS AS NEEDED
Status: DISCONTINUED | OUTPATIENT
Start: 2025-05-01 | End: 2025-05-01

## 2025-05-01 RX ORDER — CEPHALEXIN 500 MG/1
500 CAPSULE ORAL EVERY 8 HOURS SCHEDULED
Status: COMPLETED | OUTPATIENT
Start: 2025-05-01 | End: 2025-05-03

## 2025-05-01 RX ORDER — HYDROMORPHONE HCL/PF 1 MG/ML
0.5 SYRINGE (ML) INJECTION EVERY 2 HOUR PRN
Refills: 0 | Status: DISCONTINUED | OUTPATIENT
Start: 2025-05-01 | End: 2025-05-04 | Stop reason: HOSPADM

## 2025-05-01 RX ORDER — SIMETHICONE 80 MG
80 TABLET,CHEWABLE ORAL 4 TIMES DAILY PRN
Status: DISCONTINUED | OUTPATIENT
Start: 2025-05-01 | End: 2025-05-04 | Stop reason: HOSPADM

## 2025-05-01 RX ADMIN — CEPHALEXIN 500 MG: 500 CAPSULE ORAL at 21:19

## 2025-05-01 RX ADMIN — BUPIVACAINE HYDROCHLORIDE 3 ML: 5 INJECTION, SOLUTION EPIDURAL; INTRACAUDAL at 07:26

## 2025-05-01 RX ADMIN — ROPIVACAINE HYDROCHLORIDE: 2 INJECTION, SOLUTION EPIDURAL; INFILTRATION at 05:50

## 2025-05-01 RX ADMIN — LIDOCAINE HYDROCHLORIDE,EPINEPHRINE BITARTRATE 5 ML: 20; .005 INJECTION, SOLUTION EPIDURAL; INFILTRATION; INTRACAUDAL; PERINEURAL at 10:06

## 2025-05-01 RX ADMIN — Medication 62.5 MILLI-UNITS/MIN: at 12:18

## 2025-05-01 RX ADMIN — SODIUM CHLORIDE 125 ML/HR: 0.9 INJECTION, SOLUTION INTRAVENOUS at 03:14

## 2025-05-01 RX ADMIN — PHENYLEPHRINE HYDROCHLORIDE 50 MCG/MIN: 50 INJECTION INTRAVENOUS at 10:11

## 2025-05-01 RX ADMIN — BUPIVACAINE HYDROCHLORIDE 3 ML: 2.5 INJECTION, SOLUTION EPIDURAL; INFILTRATION; INTRACAUDAL at 07:26

## 2025-05-01 RX ADMIN — METRONIDAZOLE 500 MG: 500 TABLET ORAL at 21:19

## 2025-05-01 RX ADMIN — FENTANYL CITRATE 25 MCG: 50 INJECTION INTRAMUSCULAR; INTRAVENOUS at 10:41

## 2025-05-01 RX ADMIN — BUPIVACAINE HYDROCHLORIDE 3 ML: 2.5 INJECTION, SOLUTION EPIDURAL; INFILTRATION; INTRACAUDAL at 04:11

## 2025-05-01 RX ADMIN — DIPHENHYDRAMINE HYDROCHLORIDE 12.5 MG: 50 INJECTION, SOLUTION INTRAMUSCULAR; INTRAVENOUS at 10:13

## 2025-05-01 RX ADMIN — OXYCODONE HYDROCHLORIDE 5 MG: 5 TABLET ORAL at 22:43

## 2025-05-01 RX ADMIN — KETOROLAC TROMETHAMINE 15 MG: 30 INJECTION, SOLUTION INTRAMUSCULAR; INTRAVENOUS at 23:54

## 2025-05-01 RX ADMIN — ACETAMINOPHEN 650 MG: 325 TABLET, FILM COATED ORAL at 21:19

## 2025-05-01 RX ADMIN — LIDOCAINE HYDROCHLORIDE,EPINEPHRINE BITARTRATE 5 ML: 20; .005 INJECTION, SOLUTION EPIDURAL; INFILTRATION; INTRACAUDAL; PERINEURAL at 10:10

## 2025-05-01 RX ADMIN — METRONIDAZOLE 500 MG: 500 TABLET ORAL at 15:25

## 2025-05-01 RX ADMIN — Medication 500 MG: at 10:10

## 2025-05-01 RX ADMIN — FENTANYL CITRATE 100 MCG: 50 INJECTION INTRAMUSCULAR; INTRAVENOUS at 07:26

## 2025-05-01 RX ADMIN — KETOROLAC TROMETHAMINE 15 MG: 30 INJECTION, SOLUTION INTRAMUSCULAR; INTRAVENOUS at 17:29

## 2025-05-01 RX ADMIN — Medication 3000 MG: at 10:02

## 2025-05-01 RX ADMIN — ONDANSETRON 4 MG: 2 INJECTION INTRAMUSCULAR; INTRAVENOUS at 10:03

## 2025-05-01 RX ADMIN — SODIUM CHLORIDE, SODIUM LACTATE, POTASSIUM CHLORIDE, AND CALCIUM CHLORIDE: .6; .31; .03; .02 INJECTION, SOLUTION INTRAVENOUS at 10:02

## 2025-05-01 RX ADMIN — MORPHINE SULFATE 3 MG: 0.5 INJECTION, SOLUTION EPIDURAL; INTRATHECAL; INTRAVENOUS at 11:38

## 2025-05-01 RX ADMIN — DEXAMETHASONE SODIUM PHOSPHATE 10 MG: 10 INJECTION INTRAMUSCULAR; INTRAVENOUS at 10:08

## 2025-05-01 RX ADMIN — KETOROLAC TROMETHAMINE 30 MG: 30 INJECTION, SOLUTION INTRAMUSCULAR; INTRAVENOUS at 11:46

## 2025-05-01 RX ADMIN — METOCLOPRAMIDE 5 MG: 5 INJECTION, SOLUTION INTRAMUSCULAR; INTRAVENOUS at 10:13

## 2025-05-01 RX ADMIN — BUPIVACAINE HYDROCHLORIDE 3 ML: 5 INJECTION, SOLUTION EPIDURAL; INTRACAUDAL at 04:11

## 2025-05-01 RX ADMIN — CEPHALEXIN 500 MG: 500 CAPSULE ORAL at 15:25

## 2025-05-01 RX ADMIN — NALBUPHINE HYDROCHLORIDE 5 MG: 10 INJECTION, SOLUTION INTRAMUSCULAR; INTRAVENOUS; SUBCUTANEOUS at 17:06

## 2025-05-01 RX ADMIN — HYDROMORPHONE HYDROCHLORIDE 0.5 MG: 1 INJECTION, SOLUTION INTRAMUSCULAR; INTRAVENOUS; SUBCUTANEOUS at 16:15

## 2025-05-01 RX ADMIN — FENTANYL CITRATE 25 MCG: 50 INJECTION INTRAMUSCULAR; INTRAVENOUS at 11:35

## 2025-05-01 RX ADMIN — LIDOCAINE HYDROCHLORIDE,EPINEPHRINE BITARTRATE 5 ML: 20; .005 INJECTION, SOLUTION EPIDURAL; INFILTRATION; INTRACAUDAL; PERINEURAL at 10:02

## 2025-05-01 RX ADMIN — FENTANYL CITRATE 50 MCG: 50 INJECTION INTRAMUSCULAR; INTRAVENOUS at 11:00

## 2025-05-01 RX ADMIN — SODIUM CHLORIDE, SODIUM LACTATE, POTASSIUM CHLORIDE, CALCIUM CHLORIDE: 600; 310; 30; 20 INJECTION, SOLUTION INTRAVENOUS at 10:02

## 2025-05-01 RX ADMIN — FENTANYL CITRATE 50 MCG: 50 INJECTION INTRAMUSCULAR; INTRAVENOUS at 12:02

## 2025-05-01 NOTE — OB LABOR/OXYTOCIN SAFETY PROGRESS
Oxytocin Safety Progress Check Note - Araceli Levy 35 y.o. female MRN: [id number]    Unit/Bed#: -01 Encounter: [id number]    Dose (rose marie-units/min) Oxytocin: 0 rose marie-units/min (per Dr Pruitt)  Contraction Frequency (minutes): 2-4.5  Contraction Intensity: Moderate  Uterine Activity Characteristics: Irregular  Cervical Dilation: 4        Cervical Effacement: 90  Fetal Station: -1  Baseline Rate (FHR): 145 bpm  Fetal Heart Rate (FHT): 151 BPM  FHR Category: 1  Oxytocin Safety Progress Check: Safety check completed     Vital Signs:   Vitals:    05/01/25 0830   BP: 126/58   Pulse:    Resp:    Temp:    SpO2:      SVE unchanged. FHT cat 1. Pitocin turned off in the setting of failed induction of labor.    Dr. Sandoval aware    Jinny Pruitt MD 5/1/2025 8:44 AM

## 2025-05-01 NOTE — ANESTHESIA POSTPROCEDURE EVALUATION
Post-Op Assessment Note    CV Status:  Stable  Pain Score: 0    Pain management: adequate      Post-op block assessment: no complications and catheter intact   Mental Status:  Alert and awake   Hydration Status:  Euvolemic   PONV Controlled:  Controlled   Airway Patency:  Patent     Post Op Vitals Reviewed: Yes    No anethesia notable event occurred.    Staff: CRNA           Last Filed PACU Vitals:  Vitals Value Taken Time   Temp     Pulse 108 05/01/25 1155   /78 05/01/25 1152   Resp     SpO2 94 % 05/01/25 1155   Vitals shown include unfiled device data.

## 2025-05-01 NOTE — OB LABOR/OXYTOCIN SAFETY PROGRESS
Oxytocin Safety Progress Check Note - Araceli Levy 35 y.o. female MRN: [id number]    Unit/Bed#: -01 Encounter: [id number]    Dose (rose marie-units/min) Oxytocin: 20 rose marie-units/min  Contraction Frequency (minutes): 3-4  Contraction Intensity: Moderate  Uterine Activity Characteristics: Irregular  Cervical Dilation: 3        Cervical Effacement: 80  Fetal Station: -1  Baseline Rate (FHR): 135 bpm  Fetal Heart Rate (FHT): 141 BPM  FHR Category: I  Oxytocin Safety Progress Check: Safety check completed            Vital Signs:   Vitals:    04/30/25 2130   BP: 106/55   Pulse:    Resp:    Temp:    SpO2:        Notes/comments:   SVE as above, unchanged from prior. FHT Cat I. Contractions q2-4 minutes. MVUs inadequate, 180-190. Continue to titrate pitocin for adequate MVUs. Dr. Newman aware.     Mimi Reece MD 4/30/2025 9:51 PM

## 2025-05-01 NOTE — DISCHARGE SUMMARY
Obstetrics Discharge Summary  Araceli Levy 35 y.o. female MRN: [id number]  Unit/Bed#: LD PACU-02 Encounter: [id number]    Admission Date: 2025     Discharge Date: 2025    Admitting Attending: Dr. Sandoval  Delivery Attending: Dr. Sandoval  Discharging Attending:  Dr. Newman    Admitting Diagnoses:   37 weeks gestation of pregnancy  Chronic decreased fetal movement  Suspected fetal macrosomia  Diet controlled gestational diabetes mellitus      Discharge Diagnoses:   Same, delivered  Status post primary low transverse  section  Delivery of term female     Procedures: 1LTCS    Anesthesia: spinal    Hospital course: Araceli Levy is a 35 y.o.  at 37w5d admitted for induction of labor for chronic decreased fetal movement. Her induction of labor was started with craig balloon and vaginal cytotec. After craig balloon expulsion, she was started on pitocin. She had received her epidural and amniotomy was performed to clear fluid. An IUPC was placed due to difficulty tracing contractions. After amniotomy, she had remained unchanged over 11 hours, so she was given a pitocin break for an hour. An FSE was placed to due to difficulty with fetal monitoring with external monitor. She had remained relatively unchanged over the next 8 hours, so she met criteria for failed induction of labor. The decision was then made to proceed to the OR for 1LTCS. The risks of surgery were discussed to which the patient was agreeable to.     Delivery Findings:  She underwent an uncomplicated primary low transverse  section delivery on 2025 and delivered a viable female  at 10:35 AM. Placenta delivered without difficulty.   was admitted to the  nursery. Patient tolerated the procedure well and was transferred to recovery in stable condition.     Baby's Weight: 3150 g (6 lb 15.1 oz); 111.11    Apgar scores: 8  and 9  at 1 and 5 minutes, respectively  QBL: 664 mL    The  patient's post partum course was unremarkable.. Her preoperative hemoglobin was 12.4 g/dL, postoperative was 10.9. Her postoperative pain was well controlled with oral analgesics.    Her postpartum pain was well controlled with oral analgesics. Maternal blood type is O positive so RhoGAM was not indicated.    On day of discharge, she was ambulating and able to reasonably perform all ADLs. She was voiding and had appropriate bowel function. Pain was well controlled. She was discharged home on postpartum day #3 without complications. Patient was instructed to follow up with her OBGYN as an outpatient and was given appropriate warnings to call provider if she develops signs of infection or uncontrolled pain.    Complications: none apparent    Condition at discharge: Good    Disposition: Home    Planned Readmission: No    Discharge Medications:   Please see AVS for a complete list of discharge medications.    Discharge instructions :   -Do not place anything (no partner, tampons or douche) in your vagina for 6 weeks  -You may walk for exercise for the first 6 weeks then gradually return to your usual activities   -Please do not drive for 1 week if you have no stitches and for 2 weeks if you have stitches    -You may take baths or shower per your preference   -Please examine your breasts in the mirror daily and call your doctor for redness or tenderness or increased warmth    -Please call your doctor's office if temperature > 100.4*F or 38* C, worsening pain or a foul discharge.    Follow Up:  - Follow up in 3 weeks for postpartum visit or sooner if needed    Jinny Pruitt  PGY-1 OB/GYN  05/06/25  9:03 AM

## 2025-05-01 NOTE — OB LABOR/OXYTOCIN SAFETY PROGRESS
Oxytocin Safety Progress Check Note - Araceli Levy 35 y.o. female MRN: [id number]    Unit/Bed#: -01 Encounter: [id number]    Dose (rose marie-units/min) Oxytocin: 18 rose marie-units/min  Contraction Frequency (minutes): 1.5-3.5  Contraction Intensity: Moderate  Uterine Activity Characteristics: Irregular  Cervical Dilation: 4        Cervical Effacement: 90  Fetal Station: -1  Baseline Rate (FHR): 145 bpm  Fetal Heart Rate (FHT): 156 BPM  FHR Category: I  Oxytocin Safety Progress Check: Safety check completed            Vital Signs:   Vitals:    05/01/25 0430   BP: 109/61   Pulse:    Resp:    Temp:    SpO2:        Notes/comments:   SVE as above, FHT Cat I. MVUs most recently 200-210. Continue pitocin titration for adequate MVUs. Will discuss with Dr. Newman.     Mimi Reece MD 5/1/2025 4:49 AM

## 2025-05-01 NOTE — OB LABOR/OXYTOCIN SAFETY PROGRESS
Oxytocin Safety Progress Check Note - Araceli Levy 35 y.o. female MRN: [id number]    Unit/Bed#: -01 Encounter: [id number]    Dose (rose marie-units/min) Oxytocin: 20 rose marie-units/min  Contraction Frequency (minutes): 1.5-4  Contraction Intensity: Moderate  Uterine Activity Characteristics: Irregular  Cervical Dilation: 4        Cervical Effacement: 90  Fetal Station: -1  Baseline Rate (FHR): 145 bpm  Fetal Heart Rate (FHT): 148 BPM  FHR Category: 1  Oxytocin Safety Progress Check: Safety check completed            Vital Signs:   Vitals:    05/01/25 0745   BP: 113/58   Pulse: 101   Resp:    Temp:    SpO2:        Notes/comments:   SVE deferred. Tracing cat 1. Ctx q2mins on pit of 20. Plan to recheck in 1 hour or earlier when clinically indicated.       Lev Harvey MD 5/1/2025 7:57 AM

## 2025-05-01 NOTE — OP NOTE
OPERATIVE REPORT  PATIENT NAME: Araceli Levy    :  1990  MRN: [id number]  Pt Location: [facility] L&D OR ROOM 02    SURGERY DATE: 2025    Surgeons and Role:     * Rabia Sandoval MD - Primary     * Lev Harvey MD - Assisting    Preop Diagnosis:  IUP at 37 weeks and 5 days gestation  Chronic decreased fetal movement  Failed induction of labor  Elevated blood pressure without diagnosis of hypertension    Post-Op Diagnosis Codes:  Same, delivered  Status post primary low transverse  section  Delivery of term female     Procedure(s) (LRB):  Primary low transverse  section (1LTCS)    Specimen(s):  ID Type Source Tests Collected by Time Destination   1 :  Tissue (Placenta on Hold) OB Only Placenta TISSUE EXAM OB (PLACENTA) ONLY Rabia Sandoval MD 2025 1036    A :  Cord Blood Cord BLOOD GAS, VENOUS, CORD, BLOOD GAS, ARTERIAL, CORD Rabia Sandoval MD 2025 1036    B :  Blood, Arterial Cord  Rabia Sandoval MD 2025 1036    C :   Cord  Rabia Sandoval MD 2025 1036    D : PRN Tissue Fallopian Tube, Left  Rabia Sandoval MD 2025 1036    E : PRN Tissue Fallopian Tube, Right  Rabia Sandoval MD 2025 1036        Surgical QBL:  Surgical QBL (mL): 646 mL      Drains:  Urethral Catheter Non-latex 16 Fr. (Active)   Site Assessment Clean;Skin intact 25   Bell Care Done 25 0900   Collection Container Standard drainage bag 25   Securement Method Securing device (Describe) 25   Output (mL) 400 mL 25 0933   Number of days: 1       Anesthesia Type:   Spinal    Operative Indications:  IUP at 37 weeks and 5 days gestation  Chronic decreased fetal movement  Failed induction of labor    [facility] Classification System:  No Multiple pregnancy, No Transverse or oblique lie, No Breech lie, Gestational age is > or =37 weeks, Nulliparous, Labor induced +  is JARVIS GROUP 2a    Brief History  Araceli  Cam is a 35 y.o.  at 37w5d admitted for induction of labor for chronic decreased fetal movement. Her induction of labor was started with craig balloon and vaginal cytotec. After craig balloon expulsion, she was started on pitocin. She had received her epidural and amniotomy was performed to clear fluid. An IUPC was placed due to difficulty tracing contractions. After amniotomy, she had remained unchanged over 11 hours, so she was given a pitocin break for an hour. An FSE was placed to due to difficulty with fetal monitoring with external monitor. She had remained relatively unchanged over the next 8 hours, so she met criteria for failed induction of labor. The decision was then made to proceed to the OR for 1LTCS. The risks of surgery were discussed to which the patient was agreeable to.       Operative Indications:  Primary low transverse  section for failed induction of labor    Attending Surgeon: Dr. Sandoval  Resident Surgeon: Dr. Lev Harvey    Operative Findings:  1. Viable male  on 2025 at 10:35 AM with APGARs of 8  and 9  at 1 and 5 minutes. Fetus weighed 3150 g (6 lb 15.1 oz); 111.11   2. Clear amniotic fluid  3. Normal intact placenta with centrally inserted 3VC.  4. Normal uterus, bilateral tubes and ovaries  5. Adhesions absent    QBL: 664 mL    Umbilical Cord Venous Blood Gas:  Results from last 7 days   Lab Units 25  1036   PH COV  7.330   PCO2 COV mm HG 43.0   HCO3 COV mmol/L 22.2   BASE EXC COV mmol/L -3.7*   O2 CT CD VB mL/dL 10.8   O2 HGB, VENOUS CORD % 54.2     Umbilical Cord Arterial Blood Gas:  Results from last 7 days   Lab Units 25  1036   PH COA  7.286   PCO2 COA  51.1   PO2 COA mm HG 18.8   HCO3 COA mmol/L 23.8   BASE EXC COA mmol/L -3.4*   O2 CONTENT CORD ART ml/dl 8.6   O2 HGB, ARTERIAL CORD % 41.5       Operative Technique  The patient was taken to the operating room. Spinal anesthesia was adequately established and Ancef 3g and azithromycin 500mg  was given for preoperative prophylaxis. The patient was then placed in the dorsal supine position with a left tilt of the hips. The patient was then prepped with chlorhexidine for vaginal prep and chloraprep for abdominal prep and draped in the usual sterile fashion for a Pfannenstiel skin incision.      A time out was performed to confirm correct patient and correct procedure.     A Pfannenstiel incision was made and carried down through the underlying subcutaneous tissue to the fascia using a scalpel. Rectus fascia was then incised. We then proceeded in Ej-Irwin fashion. All anatomic layers were well-demarcated. The rectus muscles were  and the peritoneum was identified, entered, and extended longitudinally with blunt dissection.     The bladder blade was inserted and a transverse incision was made in the lower uterine segment using a new surgical blade. The uterine incision was extended cephalad and caudal using blunt dissection. The amniotic sac was entered.    The surgeon's hand was placed into the uterine cavity. The fetal head was identified and elevated through the uterine incision with the assistance of fundal pressure. With gentle traction, the shoulder was delivered, followed by the rest of the fetal body. There was no nuchal cord noted. On delivery the cord was doubly clamped and cut after delayed cord clamping. The infant was then passed off the table to the awaiting  staff. The  was noted to cry spontaneously and moved all extremities. Venous and arterial blood gas, cord blood, and portion of cord was obtained for analysis and routine blood testing. The placenta delivery was then sent to storage. Placenta was noted to be intact with a centrally inserted three-vessel cord.  Oxytocin was administered by IV infusion to enhance uterine contraction. The uterus was exteriorized and cleared of all clots and remaining products of conception.      The uterine incision was re  approximated using an 0 Vicryl in a running locked fashion. It was noted that the hysterotomy site had a left sided extension with brisk bleeding requiring O-Mobile suture on the left uterine artery, which was then noted to be hemostatic. There was some oozing noted on the middle of the hysterotomy site, so the same 0-Vicryl suture was used to tie in a figure of eight fashion, and hemostasis was achieved.  A second horizontal imbricating stitch with another 0-Vicryl suture was applied. The uterine incision was examined and noted to be hemostatic. The posterior cul-de-sac was cleared of all clots and products of conception. The uterus was replaced into the abdomen and the pericolic gutters were cleared of all clots.  The uterine incision was once again reexamined and noted to be hemostatic. The fascia was re approximated using an 0 Vicryl in a running nonlocked fashion. The subcutaneous tissue was irrigated and cleared of all clots and debris. Good hemostasis was noted with Bovie electrocautery. The subcutaneous tissue was reapproximated with 2-0 Plain gut. The skin incision was closed using 3-0 Monocryl with exofin. Good hemostasis was noted. Patient tolerated the procedure well. All needle, sponge, and instrument counts were noted to be correct x 2 at the end of the procedure.  Patient was transferred to the recovery room in stable condition. Dr. Sandoval was present and participated in the entire surgery.    Patient Disposition:  PACU         SIGNATURE: Lev Harvey MD  DATE: May 1, 2025  TIME: 12:11 PM

## 2025-05-01 NOTE — OB LABOR/OXYTOCIN SAFETY PROGRESS
Oxytocin Safety Progress Check Note - Araceli Levy 35 y.o. female MRN: [id number]    Unit/Bed#: -01 Encounter: [id number]    Dose (rose marie-units/min) Oxytocin: 12 rose marie-units/min  Contraction Frequency (minutes): 2-4  Contraction Intensity: Moderate  Uterine Activity Characteristics: Irregular  Cervical Dilation: 3        Cervical Effacement: 80  Fetal Station: -1  Baseline Rate (FHR): 140 bpm  Fetal Heart Rate (FHT): 139 BPM  FHR Category: I  Oxytocin Safety Progress Check: Safety check completed            Vital Signs:   Vitals:    05/01/25 0100   BP: 135/75   Pulse: 93   Resp:    Temp:    SpO2:        Notes/comments:   SVE as above, unchanged from prior. Pitocin restarted after 1 hour pit break. Will titrate for adequate MVUs. Dr. Newman aware.      Mimi Reece MD 5/1/2025 1:18 AM

## 2025-05-01 NOTE — OB LABOR/OXYTOCIN SAFETY PROGRESS
Oxytocin Safety Progress Check Note - Araceli Levy 35 y.o. female MRN: [id number]    Unit/Bed#: -01 Encounter: [id number]    Dose (rose marie-units/min) Oxytocin: 14 rose marie-units/min  Contraction Frequency (minutes): 1-3.5  Contraction Intensity: Moderate  Uterine Activity Characteristics: Irregular  Cervical Dilation: 4        Cervical Effacement: 90  Fetal Station: -1  Baseline Rate (FHR): 140 bpm  Fetal Heart Rate (FHT): 140 BPM  FHR Category: 1  Oxytocin Safety Progress Check: Safety check completed            Vital Signs:   Vitals:    05/01/25 0100   BP: 135/75   Pulse: 93   Resp:    Temp:    SpO2:        Notes/comments:   SVE as above.   FSE placed for improved fetal monitoring.      Laura Blair MD 5/1/2025 1:56 AM

## 2025-05-02 PROBLEM — O14.90 PREECLAMPSIA: Status: ACTIVE | Noted: 2025-02-22

## 2025-05-02 LAB
ERYTHROCYTE [DISTWIDTH] IN BLOOD BY AUTOMATED COUNT: 14.8 % (ref 11.6–15.1)
HCT VFR BLD AUTO: 34.5 % (ref 34.8–46.1)
HGB BLD-MCNC: 10.9 G/DL (ref 11.5–15.4)
MCH RBC QN AUTO: 26.1 PG (ref 26.8–34.3)
MCHC RBC AUTO-ENTMCNC: 31.6 G/DL (ref 31.4–37.4)
MCV RBC AUTO: 83 FL (ref 82–98)
PLATELET # BLD AUTO: 237 THOUSANDS/UL (ref 149–390)
PMV BLD AUTO: 9.1 FL (ref 8.9–12.7)
RBC # BLD AUTO: 4.17 MILLION/UL (ref 3.81–5.12)
WBC # BLD AUTO: 16.91 THOUSAND/UL (ref 4.31–10.16)

## 2025-05-02 PROCEDURE — 99222 1ST HOSP IP/OBS MODERATE 55: CPT

## 2025-05-02 PROCEDURE — 99024 POSTOP FOLLOW-UP VISIT: CPT | Performed by: OBSTETRICS & GYNECOLOGY

## 2025-05-02 PROCEDURE — 85027 COMPLETE CBC AUTOMATED: CPT

## 2025-05-02 RX ORDER — ACETAMINOPHEN 10 MG/ML
1000 INJECTION, SOLUTION INTRAVENOUS ONCE
Status: COMPLETED | OUTPATIENT
Start: 2025-05-02 | End: 2025-05-02

## 2025-05-02 RX ORDER — LIDOCAINE 50 MG/G
1 PATCH TOPICAL DAILY
Status: DISCONTINUED | OUTPATIENT
Start: 2025-05-02 | End: 2025-05-04 | Stop reason: HOSPADM

## 2025-05-02 RX ADMIN — ENOXAPARIN SODIUM 40 MG: 40 INJECTION SUBCUTANEOUS at 22:09

## 2025-05-02 RX ADMIN — DOCUSATE SODIUM 100 MG: 100 CAPSULE, LIQUID FILLED ORAL at 08:14

## 2025-05-02 RX ADMIN — CEPHALEXIN 500 MG: 500 CAPSULE ORAL at 05:03

## 2025-05-02 RX ADMIN — HYDROMORPHONE HYDROCHLORIDE 0.5 MG: 1 INJECTION, SOLUTION INTRAMUSCULAR; INTRAVENOUS; SUBCUTANEOUS at 08:15

## 2025-05-02 RX ADMIN — ENOXAPARIN SODIUM 40 MG: 40 INJECTION SUBCUTANEOUS at 08:14

## 2025-05-02 RX ADMIN — OXYCODONE HYDROCHLORIDE 10 MG: 10 TABLET ORAL at 12:35

## 2025-05-02 RX ADMIN — ACETAMINOPHEN 650 MG: 325 TABLET, FILM COATED ORAL at 03:27

## 2025-05-02 RX ADMIN — ACETAMINOPHEN 1000 MG: 10 INJECTION INTRAVENOUS at 22:09

## 2025-05-02 RX ADMIN — METRONIDAZOLE 500 MG: 500 TABLET ORAL at 22:09

## 2025-05-02 RX ADMIN — KETOROLAC TROMETHAMINE 15 MG: 30 INJECTION, SOLUTION INTRAMUSCULAR; INTRAVENOUS at 18:29

## 2025-05-02 RX ADMIN — SIMETHICONE 80 MG: 80 TABLET, CHEWABLE ORAL at 12:35

## 2025-05-02 RX ADMIN — KETOROLAC TROMETHAMINE 15 MG: 30 INJECTION, SOLUTION INTRAMUSCULAR; INTRAVENOUS at 05:03

## 2025-05-02 RX ADMIN — CEPHALEXIN 500 MG: 500 CAPSULE ORAL at 22:09

## 2025-05-02 RX ADMIN — LORATADINE 10 MG: 10 TABLET ORAL at 08:14

## 2025-05-02 RX ADMIN — CEPHALEXIN 500 MG: 500 CAPSULE ORAL at 13:59

## 2025-05-02 RX ADMIN — DOCUSATE SODIUM 100 MG: 100 CAPSULE, LIQUID FILLED ORAL at 17:13

## 2025-05-02 RX ADMIN — Medication 1 TABLET: at 08:14

## 2025-05-02 RX ADMIN — OXYCODONE HYDROCHLORIDE 10 MG: 10 TABLET ORAL at 17:13

## 2025-05-02 RX ADMIN — KETOROLAC TROMETHAMINE 15 MG: 30 INJECTION, SOLUTION INTRAMUSCULAR; INTRAVENOUS at 12:36

## 2025-05-02 RX ADMIN — ACETAMINOPHEN 650 MG: 325 TABLET, FILM COATED ORAL at 08:14

## 2025-05-02 RX ADMIN — LIDOCAINE 1 PATCH: 50 PATCH CUTANEOUS at 17:27

## 2025-05-02 RX ADMIN — METRONIDAZOLE 500 MG: 500 TABLET ORAL at 13:59

## 2025-05-02 RX ADMIN — HYDROMORPHONE HYDROCHLORIDE 0.5 MG: 1 INJECTION, SOLUTION INTRAMUSCULAR; INTRAVENOUS; SUBCUTANEOUS at 00:52

## 2025-05-02 RX ADMIN — METRONIDAZOLE 500 MG: 500 TABLET ORAL at 05:03

## 2025-05-02 RX ADMIN — ACETAMINOPHEN 650 MG: 325 TABLET, FILM COATED ORAL at 13:59

## 2025-05-02 NOTE — ASSESSMENT & PLAN NOTE
Status post primary low-transverse  and received neuraxial Duramorph .  Neuraxial Duramorph:     Methadone and Duramorph Intra-procedure Administrations (last 48 hours) Showing orders from other encounters Morphine (Duramorph) may show additional administrations that are not neuraxial. Review route and comments.      Date/Time Action Medication Dose    25 1138 Given    morphine (PF) (DURAMORPH) injection 3 mg           Monitoring to continue for 24 hours post Duramorph administration:  Monitor pain, HR, BP, respirations, level of sedation and oxygen levels as ordered  Continuous pulse oximetry (or capnography) for 24 hours.  Notify Anesthesia/Acute Pain Services for the following:  Persistent pruritis, persistent nausea, or if respiratory rate is less than or equal to 10 breaths per minute or if pain is unrelieved or if patient is excessively sedated or O2 Sat less than 90% or EtCO2 outside the parameters of Respiratory Therapy Capnography/EtCO2 policy  No narcotics / sedatives / sleeping agents not ordered by Anesthesia/Acute Pain Service for 24 hours post duramorph    Multimodal analgesia:  Tylenol 650 mg every 6 hours scheduled.  Toradol 15 mg IV every 6 hours scheduled x 24 hours postdelivery  Followed by ibuprofen 600 mg every 6 hours scheduled.  Oxycodone 5 mg every 4 hours as needed for moderate pain  Oxycodone 10 mg every 4 hours as needed for severe pain  Dilaudid 0.5 mg IV every 2 hours as needed for breakthrough pain  Narcan as needed for respiratory depression/opioid reversal  If utilizing opioids recommend addition of bowel regimen.    Symptom management:  Nubain 5 mg IV as needed for pruritus now   Zofran 4 mg IV every 8 hours as needed for nausea/vomiting  Benadryl 25 mg p.o. every 6 hours as needed for itching

## 2025-05-02 NOTE — CONSULTS
Consultation - Acute Pain   Name: Araceli Levy 35 y.o. female I MRN: [id number]  Unit/Bed#: -01 I Date of Admission: 2025   Date of Service: 2025 I Hospital Day: 3   Inpatient consult to Acute Pain Service  Consult performed by: STEW Mccloud  Consult ordered by: Isidro Aponte MD        Physician Requesting Evaluation: Rabia Sandoval MD   Reason for Evaluation / Principal Problem: Duramorph follow-up    Assessment & Plan  Status post primary low transverse  section  Status post primary low-transverse  and received neuraxial Duramorph .  Neuraxial Duramorph:     Methadone and Duramorph Intra-procedure Administrations (last 48 hours) Showing orders from other encounters Morphine (Duramorph) may show additional administrations that are not neuraxial. Review route and comments.      Date/Time Action Medication Dose    25 1138 Given    morphine (PF) (DURAMORPH) injection 3 mg           Monitoring to continue for 24 hours post Duramorph administration:  Monitor pain, HR, BP, respirations, level of sedation and oxygen levels as ordered  Continuous pulse oximetry (or capnography) for 24 hours.  Notify Anesthesia/Acute Pain Services for the following:  Persistent pruritis, persistent nausea, or if respiratory rate is less than or equal to 10 breaths per minute or if pain is unrelieved or if patient is excessively sedated or O2 Sat less than 90% or EtCO2 outside the parameters of Respiratory Therapy Capnography/EtCO2 policy  No narcotics / sedatives / sleeping agents not ordered by Anesthesia/Acute Pain Service for 24 hours post duramorph    Multimodal analgesia:  Tylenol 650 mg every 6 hours scheduled.  Toradol 15 mg IV every 6 hours scheduled x 24 hours postdelivery  Followed by ibuprofen 600 mg every 6 hours scheduled.  Oxycodone 5 mg every 4 hours as needed for moderate pain  Oxycodone 10 mg every 4 hours as needed for severe pain  Dilaudid 0.5 mg IV every 2  hours as needed for breakthrough pain  Narcan as needed for respiratory depression/opioid reversal  If utilizing opioids recommend addition of bowel regimen.    Symptom management:  Nubain 5 mg IV as needed for pruritus now   Zofran 4 mg IV every 8 hours as needed for nausea/vomiting  Benadryl 25 mg p.o. every 6 hours as needed for itching          APS will sign off at this time. Thank you for the consult. All opioids and other analgesics to be written at discretion of primary team. Please contact Acute Pain Service - via SecureChat from [phone] with additional questions or concerns. See SecureChat or [employer] for additional contacts and after hours information.    History of Present Illness    HPI: Araceli Levy is a 35 y.o. year old female who is status post   and received neuraxial Duramorph.    Seen at bedside, sitting up in chair without acute distress.  Reports incisional abdominal pain.  Received a dose of IV Dilaudid earlier this morning with overall improvement in pain scores.  Reports Tylenol/Toradol did not help the pain.  Patient has been up and ambulating without lower extremity weakness or paresthesias.  Tolerating oral diet without nausea/vomiting.  Denies pruritus.  No other acute complaints currently.    Current pain location(s): Pain Score: 2  Pain Location/Orientation: Orientation: Lower, Location: Abdomen, Location: Incision  Pain Scale: Pain Assessment Tool: 0-10    I have reviewed the patient's controlled substance dispensing history in the Prescription Drug Monitoring Program in compliance with the [facility] regulations before prescribing any controlled substances.     Review of Systems   Gastrointestinal:  Positive for abdominal pain.   All other systems reviewed and are negative.    Medical History Review: I have reviewed the patient's PMH, PSH, Social History, Family History, Meds, and Allergies     Objective :  Temp:  [97.5 °F (36.4 °C)-99.8 °F (37.7 °C)] 98 °F (36.7  °C)  HR:  [] 100  BP: (113-144)/(58-89) 116/70  Resp:  [18-20] 20  SpO2:  [92 %-99 %] 99 %  O2 Device: None (Room air)    Physical Exam  Vitals reviewed.   HENT:      Head: Normocephalic and atraumatic.   Pulmonary:      Effort: Pulmonary effort is normal.   Chest:      Chest wall: No tenderness.   Abdominal:      Tenderness: There is abdominal tenderness.   Musculoskeletal:         General: Normal range of motion.      Cervical back: Normal range of motion.   Skin:     General: Skin is warm and dry.   Neurological:      Mental Status: She is alert and oriented to person, place, and time. Mental status is at baseline.   Psychiatric:         Mood and Affect: Mood normal.         Behavior: Behavior normal.          Lab Results: I have reviewed the following results:  Estimated Creatinine Clearance: 163.4 mL/min (by C-G formula based on SCr of 0.66 mg/dL).  Lab Results   Component Value Date    WBC 16.91 (H) 05/02/2025    HGB 10.9 (L) 05/02/2025    HCT 34.5 (L) 05/02/2025     05/02/2025         Component Value Date/Time    K 3.5 05/01/2025 0943    K 3.6 02/10/2024 1255     05/01/2025 0943     02/10/2024 1255    CO2 20 (L) 05/01/2025 0943    CO2 23 02/10/2024 1255    BUN 9 05/01/2025 0943    BUN 14 02/10/2024 1255    CREATININE 0.66 05/01/2025 0943    CREATININE 0.76 02/10/2024 1255         Component Value Date/Time    CALCIUM 8.6 05/01/2025 0943    CALCIUM 9.9 02/10/2024 1255    ALKPHOS 87 05/01/2025 0943    AST 20 05/01/2025 0943    ALT 14 05/01/2025 0943    TP 6.6 05/01/2025 0943    ALB 3.3 (L) 05/01/2025 0943       Imaging Results Review: No pertinent imaging studies reviewed.  Other Study Results Review: No additional pertinent studies reviewed.

## 2025-05-02 NOTE — PLAN OF CARE
Problem: BIRTH - VAGINAL/ SECTION  Goal: Fetal and maternal status remain reassuring during the birth process  Description: INTERVENTIONS:- Monitor vital signs- Monitor fetal heart rate- Monitor uterine activity- Monitor labor progression (vaginal delivery)- DVT prophylaxis- Antibiotic prophylaxis  Outcome: Progressing  Goal: Emotionally satisfying birthing experience for mother/fetus  Description: Interventions:- Assess, plan, implement and evaluate the nursing care given to the patient in labor- Advocate the philosophy that each childbirth experience is a unique experience and support the family's chosen level of involvement and control during the labor process - Actively participate in both the patient's and family's teaching of the birth process- Consider cultural, Buddhist and age-specific factors and plan care for the patient in labor  Outcome: Progressing     Problem: PAIN - ADULT  Goal: Verbalizes/displays adequate comfort level or baseline comfort level  Description: Interventions:- Encourage patient to monitor pain and request assistance- Assess pain using appropriate pain scale- Administer analgesics based on type and severity of pain and evaluate response- Implement non-pharmacological measures as appropriate and evaluate response- Consider cultural and social influences on pain and pain management- Notify physician/advanced practitioner if interventions unsuccessful or patient reports new pain  Outcome: Progressing     Problem: INFECTION - ADULT  Goal: Absence or prevention of progression during hospitalization  Description: INTERVENTIONS:- Assess and monitor for signs and symptoms of infection- Monitor lab/diagnostic results- Monitor all insertion sites, i.e. indwelling lines, tubes, and drains- Monitor endotracheal if appropriate and nasal secretions for changes in amount and color- [place] appropriate cooling/warming therapies per order- Administer medications as ordered- Instruct and  encourage patient and family to use good hand hygiene technique- Identify and instruct in appropriate isolation precautions for identified infection/condition  Outcome: Progressing  Goal: Absence of fever/infection during neutropenic period  Description: INTERVENTIONS:- Monitor WBC  Outcome: Progressing     Problem: SAFETY ADULT  Goal: Patient will remain free of falls  Description: INTERVENTIONS:- Educate patient/family on patient safety including physical limitations- Instruct patient to call for assistance with activity - Consult OT/PT to assist with strengthening/mobility - Keep Call bell within reach- Keep bed low and locked with side rails adjusted as appropriate- Keep care items and personal belongings within reach- Initiate and maintain comfort rounds- Make Fall Risk Sign visible to staff  Outcome: Progressing  Goal: Maintain or return to baseline ADL function  Description: INTERVENTIONS:-  Assess patient's ability to carry out ADLs; assess patient's baseline for ADL function and identify physical deficits which impact ability to perform ADLs (bathing, care of mouth/teeth, toileting, grooming, dressing, etc.)- Assess/evaluate cause of self-care deficits - Assess range of motion- Assess patient's mobility; develop plan if impaired- Assess patient's need for assistive devices and provide as appropriate- Encourage maximum independence but intervene and supervise when necessary- Involve family in performance of ADLs- Assess for home care needs following discharge - Consider OT consult to assist with ADL evaluation and planning for discharge- Provide patient education as appropriate  Outcome: Progressing  Goal: Maintains/Returns to pre admission functional level  Description: INTERVENTIONS:- Perform AM-PAC 6 Click Basic Mobility/ Daily Activity assessment daily.- Set and communicate daily mobility goal to care team and patient/family/caregiver. - Collaborate with rehabilitation services on mobility goals if  consulted- Perform Range of Motion  Outcome: Progressing     Problem: Knowledge Deficit  Goal: Patient/family/caregiver demonstrates understanding of disease process, treatment plan, medications, and discharge instructions  Description: Complete learning assessment and assess knowledge base.Interventions:- Provide teaching at level of understanding- Provide teaching via preferred learning methods  Outcome: Progressing     Problem: DISCHARGE PLANNING  Goal: Discharge to home or other facility with appropriate resources  Description: INTERVENTIONS:- Identify barriers to discharge w/patient and caregiver- Arrange for needed discharge resources and transportation as appropriate- Identify discharge learning needs (meds, wound care, etc.)- Arrange for interpretive services to assist at discharge as needed- Refer to Case Management Department for coordinating discharge planning if the patient needs post-hospital services based on physician/advanced practitioner order or complex needs related to functional status, cognitive ability, or social support system  Outcome: Progressing

## 2025-05-02 NOTE — ASSESSMENT & PLAN NOTE
- QBL: 664 mL, Hgb 12.4 -> 10.9; L hysterotomy extension with O'Tensed on left uterine artery  - Pain well controlled with oral analgesics  - F/u VT, had two voids of 150 mL  - Tolerating PO fluids and solids  - Ambulating without difficulty  - DVT ppx with Lovenox 40mg BID, SCDs  - Contraception: undecided  - Breastfeeding  - Anticipate discharge POD#2-4 pending BP's

## 2025-05-02 NOTE — UTILIZATION REVIEW
Initial Clinical Review    Admission: Date/Time/Statement:   Admission Orders (From admission, onward)       Ordered        04/29/25 1545  Inpatient Admission  Once                          Orders Placed This Encounter   Procedures    Inpatient Admission     Standing Status:   Standing     Number of Occurrences:   1     Level of Care:   Med Surg [16]     Estimated length of stay:   More than 2 Midnights     Certification:   I certify that inpatient services are medically necessary for this patient for a duration of greater than two midnights. See H&P and MD Progress Notes for additional information about the patient's course of treatment.         Chief Complaint   Patient presents with    Scheduled Induction       Initial Presentation: 35 y.o. female presented to L&D as inpatient admission for IOL for chronic decreased fetal movement. Plan continuous external monitoring, IVF,cytotec craig balloon, IV pitocin if needed Epidural and IV MGSO 4 if needed and supportive care   SVE: 1/70/-3     @ 1859  Craig balloon inserted   Cervical Dilation: 1  Cervical Effacement: 70  Fetal Station: -3  Fetal Heart Rate (FHT): 141 BPM  FHR Category: I     04-30-25  @0024  Craig expelled  IV Pitocin started  Contraction Frequency (minutes): 0  Contraction Intensity: Mild  Uterine Activity Characteristics: Occasional  Cervical Dilation: 3  Cervical Effacement: 80  Fetal Station: -2  Baseline Rate (FHR): 135 bpm  Fetal Heart Rate (FHT): 130 BPM  FHR Category: I    @0440  Dose (rose marie-units/min) Oxytocin: 4 rose marie-units/min  Contraction Frequency (minutes): diff to determine, side lying  Contraction Intensity: Mild/Moderate  Uterine Activity Characteristics: Irregular  Cervical Dilation: 3  Cervical Effacement: 80  Fetal Station: -2  Baseline Rate (FHR): 125 bpm  Fetal Heart Rate (FHT): 137 BPM  FHR Category: I    @ 0906  S/p epidural     @ 1111  AROM clear fluid  Dose (rose marie-units/min) Oxytocin: 10 rose marie-units/min  Contraction Frequency  (minutes): 5-7  Contraction Intensity: Mild/Moderate  Uterine Activity Characteristics: Irregular  Cervical Dilation: 3  Cervical Effacement: 80  Fetal Station: -2  Baseline Rate (FHR): 140 bpm  Fetal Heart Rate (FHT): 140 BPM  FHR Category: 1    @ 1712  Dose (rose marie-units/min) Oxytocin: 16 rose marie-units/min  Contraction Frequency (minutes): 1-4  Contraction Intensity: Moderate  Uterine Activity Characteristics: Coupling  Cervical Dilation: 3  Cervical Effacement: 80  Fetal Station: -1  Baseline Rate (FHR): 135 bpm  Fetal Heart Rate (FHT): 152 BPM  FHR Category: I    @ 2151  Dose (rose marie-units/min) Oxytocin: 20 rose marie-units/min  Contraction Frequency (minutes): 3-4  Contraction Intensity: Moderate  Uterine Activity Characteristics: Irregular  Cervical Dilation: 3  Cervical Effacement: 80  Fetal Station: -1  Baseline Rate (FHR): 135 bpm  Fetal Heart Rate (FHT): 141 BPM  FHR Category: I    05-01-25  @0118  Dose (rose marie-units/min) Oxytocin: 12 rose marie-units/min  Contraction Frequency (minutes): 2-4  Contraction Intensity: Moderate  Uterine Activity Characteristics: Irregular  Cervical Dilation: 3  Cervical Effacement: 80  Fetal Station: -1  Baseline Rate (FHR): 140 bpm  Fetal Heart Rate (FHT): 139 BPM  FHR Category: I    @ 0449  Dose (rose marie-units/min) Oxytocin: 18 rose marie-units/min  Contraction Frequency (minutes): 1.5-3.5  Contraction Intensity: Moderate  Uterine Activity Characteristics: Irregular  Cervical Dilation: 4  Cervical Effacement: 90  Fetal Station: -1  Baseline Rate (FHR): 145 bpm  Fetal Heart Rate (FHT): 156 BPM  FHR Category: I    @ 0757  ose (rose marie-units/min) Oxytocin: 20 rose marie-units/min  Contraction Frequency (minutes): 1.5-4  Contraction Intensity: Moderate  Uterine Activity Characteristics: Irregular  Cervical Dilation: 4  Cervical Effacement: 90  Fetal Station: -1  Baseline Rate (FHR): 145 bpm  Fetal Heart Rate (FHT): 148 BPM  FHR Category: 1    @ 0844  Dose (rose marie-units/min) Oxytocin: 0 rose marie-units/min (per  Dr Rep)  Contraction Frequency (minutes): 2-4.5  Contraction Intensity: Moderate  Uterine Activity Characteristics: Irregular  Cervical Dilation: 4  Cervical Effacement: 90  Fetal Station: -1  Baseline Rate (FHR): 145 bpm  Fetal Heart Rate (FHT): 151 BPM  FHR Category: 1  Pitocin turned off in the setting of failed induction of labor     25   @ 37 w 5 d  Male @ 01:35  Apgar 8  9  Wt 3150 grams  Infant dried suctioned stimulated and warmed. Responded well and taken to NBN       Scheduled Medications:  acetaminophen, 650 mg, Oral, Q6H OSCAR  cephalexin, 500 mg, Oral, Q8H OSCAR  docusate sodium, 100 mg, Oral, BID  enoxaparin, 40 mg, Subcutaneous, Q12H OSCAR  ketorolac, 15 mg, Intravenous, Q6H OSCAR   Followed by  [START ON 5/3/2025] ibuprofen, 600 mg, Oral, Q6H  loratadine, 10 mg, Oral, Daily  metroNIDAZOLE, 500 mg, Oral, Q8H OSCAR  prenatal multivitamin, 1 tablet, Oral, Daily      Continuous IV Infusions:     PRN Meds:  benzocaine-menthol-lanolin-aloe, 1 Application, Topical, Q6H PRN  calcium carbonate, 1,000 mg, Oral, Daily PRN  diphenhydrAMINE, 25 mg, Oral, Q6H PRN  hydrocortisone, 1 Application, Topical, Daily PRN  HYDROmorphone, 0.5 mg, Intravenous, Q2H PRN  naloxone, 0.04 mg, Intravenous, Q1MIN PRN  ondansetron, 4 mg, Intravenous, Once PRN  ondansetron, 4 mg, Intravenous, Q8H PRN  oxyCODONE, 10 mg, Oral, Q4H PRN  oxyCODONE, 5 mg, Oral, Q4H PRN  simethicone, 80 mg, Oral, 4x Daily PRN  witch hazel-glycerin, 1 Pad, Topical, Q4H PRN      ED Triage Vitals   Temperature Pulse Respirations Blood Pressure SpO2 Pain Score   25 1712 25 1712 25 1712 25 1712 25 0838 25 1708   98.2 °F (36.8 °C) 96 20 105/52 99 % 5     Weight (last 2 days)       None            Vital Signs (last 3 days)       Date/Time Temp Pulse Resp BP MAP (mmHg) SpO2 O2 Device Cardiac (WDL) Patient Position - Orthostatic VS Pain    25 1235 -- -- -- -- -- -- -- -- -- 10 - Worst Possible Pain    25 1212  98.1 °F (36.7 °C) 96 18 111/70 84 -- -- -- Sitting --    05/02/25 0918 98 °F (36.7 °C) 100 20 116/70 -- -- -- -- -- 2 05/02/25 0815 -- -- -- -- -- -- -- WDL -- 8 05/02/25 0814 -- -- -- -- -- -- -- -- -- 8 05/02/25 0503 -- -- -- -- -- -- -- -- -- 7 05/02/25 0448 97.8 °F (36.6 °C) 96 18 120/60 -- 99 % None (Room air) -- Sitting 7 05/02/25 0052 -- -- -- -- -- -- -- -- -- 7 05/02/25 0019 97.6 °F (36.4 °C) 94 18 123/72 86 98 % None (Room air) -- Sitting No Pain    05/01/25 2243 -- -- -- -- -- -- -- -- -- 8 05/01/25 2016 97.5 °F (36.4 °C) 83 18 139/87 -- 97 % None (Room air) -- Lying 3    05/01/25 2001 -- -- -- -- -- -- None (Room air) WDL -- --    05/01/25 1729 -- -- -- -- -- -- -- -- -- 3    05/01/25 1701 97.6 °F (36.4 °C) 69 19 129/67 92 98 % None (Room air) -- Lying 2    05/01/25 1615 97.6 °F (36.4 °C) 81 18 144/89 -- 97 % None (Room air) -- Lying 8    05/01/25 1500 97.7 °F (36.5 °C) 74 18 120/86 -- 98 % None (Room air) -- Lying 6    05/01/25 1400 -- -- -- -- -- -- -- WDL -- --    05/01/25 1353 98.3 °F (36.8 °C) 82 18 131/79 -- 98 % None (Room air) -- Sitting 5    05/01/25 1315 98.1 °F (36.7 °C) 85 18 122/66 -- 97 % -- -- Sitting --    05/01/25 1300 -- 84 -- 114/67 -- 97 % -- -- -- --    05/01/25 1245 99.7 °F (37.6 °C) 91 18 113/58 -- 96 % -- -- Sitting --    05/01/25 1230 99.7 °F (37.6 °C) 97 18 126/59 -- 92 % -- -- Sitting 4    05/01/25 1215 99.8 °F (37.7 °C) 105 18 131/62 -- 93 % -- -- Sitting 4    05/01/25 1202 -- -- -- -- -- -- -- -- -- 8    05/01/25 1200 -- 106 -- 127/59 -- 94 % -- -- -- --    05/01/25 1152 99.7 °F (37.6 °C) 100 18 129/78 -- 94 % None (Room air) WDL Lying 6    05/01/25 0845 97.9 °F (36.6 °C) 100 18 129/62 -- -- -- -- Lying --    05/01/25 0830 -- -- -- 126/58 -- -- -- -- -- --    05/01/25 0800 -- 95 -- 114/58 -- -- -- -- -- --    05/01/25 0745 -- 101 -- 113/58 -- -- -- -- -- --    05/01/25 0730 -- -- -- 120/67 -- -- -- -- -- --    05/01/25 0700 -- 90 -- 128/58 -- -- -- -- --  --    05/01/25 0645 -- 98 -- 109/55 -- -- -- -- -- --    05/01/25 0630 -- 109 -- 125/61 -- -- -- -- -- --    05/01/25 0615 -- 93 -- 109/56 -- -- -- -- -- --    05/01/25 0600 -- 96 -- 123/57 -- -- -- -- -- --    05/01/25 0545 -- 98 -- 132/62 -- -- -- -- -- --    05/01/25 0541 -- 98 -- 132/62 -- -- -- -- -- --    05/01/25 0530 -- 91 -- 126/60 -- -- -- -- -- --    05/01/25 0515 -- 97 -- 119/59 -- -- -- -- -- --    05/01/25 0500 -- -- -- 113/66 -- -- -- -- -- --    05/01/25 0430 -- -- -- 109/61 -- -- -- -- -- --    05/01/25 0400 98.2 °F (36.8 °C) -- -- -- -- -- -- -- -- --    05/01/25 0345 -- 110 -- 121/53 -- -- -- -- -- --    05/01/25 0330 -- 105 -- 112/52 -- -- -- -- -- --    05/01/25 0315 -- 96 -- 109/53 -- -- -- -- -- --    05/01/25 0300 -- -- -- 122/57 -- -- -- -- -- --    05/01/25 0245 -- -- -- 112/55 -- -- -- -- -- --    05/01/25 0230 -- -- -- 125/59 -- -- -- -- -- --    05/01/25 0200 97.5 °F (36.4 °C) -- -- 132/66 -- -- -- -- -- --    05/01/25 0130 -- 102 -- 134/60 -- -- -- -- -- --    05/01/25 0100 -- 93 -- 135/75 -- -- -- -- -- --    05/01/25 0045 -- 94 -- 123/58 -- -- -- -- -- --    05/01/25 0030 -- 94 -- 128/60 -- -- -- -- -- --    05/01/25 0013 -- 97 -- 139/64 -- -- -- -- -- --    05/01/25 0000 98 °F (36.7 °C) 99 -- 128/65 -- -- -- -- -- --    04/30/25 2330 -- 91 -- 127/58 -- -- -- -- -- --    04/30/25 2315 -- 100 -- 133/70 -- -- -- -- -- --    04/30/25 2313 -- -- -- -- -- -- -- -- -- 5    04/30/25 2300 -- -- -- 137/64 -- -- -- -- -- --    04/30/25 2245 -- -- -- 131/61 -- -- -- -- -- --    04/30/25 2230 -- 85 -- 126/60 -- -- -- -- -- --    04/30/25 2215 -- -- -- 126/66 -- -- -- -- -- --    04/30/25 2200 98.1 °F (36.7 °C) 92 -- 136/73 -- -- -- -- -- --    04/30/25 2145 -- -- -- 108/52 -- -- -- -- -- --    04/30/25 2130 -- -- -- 106/55 -- -- -- -- -- --    04/30/25 2115 -- -- -- 100/52 -- -- -- -- -- --    04/30/25 2100 -- -- -- 94/55 -- -- -- -- -- --    04/30/25 2045 -- -- -- 91/50 -- -- -- -- -- --     04/30/25 2030 -- -- -- 95/53 -- -- -- -- -- --    04/30/25 2015 -- 74 -- 113/58 -- -- -- -- -- --    04/30/25 2000 -- 71 -- 118/66 -- -- -- -- -- --    04/30/25 1945 97.9 °F (36.6 °C) 81 18 103/55 -- -- -- -- Lying --    04/30/25 1930 -- -- -- 108/66 -- -- -- -- -- No Pain    04/30/25 1915 -- -- -- 104/59 -- -- -- -- -- --    04/30/25 1900 -- -- -- 112/53 -- -- -- -- -- --    04/30/25 1843 -- 90 -- 123/53 -- -- -- -- -- --    04/30/25 1829 -- 68 -- 118/67 -- -- -- -- -- --    04/30/25 1812 97.9 °F (36.6 °C) 69 20 116/60 -- -- -- -- -- --    04/30/25 1757 -- 73 -- 116/58 -- -- -- -- -- --    04/30/25 1742 -- 71 -- 122/56 -- -- -- -- -- --    04/30/25 1727 -- 76 -- 115/56 -- -- -- -- -- --    04/30/25 1713 -- 74 -- 115/55 -- -- -- -- -- --    04/30/25 1659 97.5 °F (36.4 °C) 72 20 119/55 -- -- -- -- -- 1    04/30/25 1627 -- 99 -- 101/53 -- -- -- -- -- --    04/30/25 1612 -- 71 -- 106/54 -- -- -- -- -- --    04/30/25 1559 -- 75 -- 112/57 -- -- -- -- -- --    04/30/25 1546 -- -- -- -- -- -- -- -- -- 2    04/30/25 1512 -- 80 -- 108/56 -- -- -- -- -- --    04/30/25 1501 97.8 °F (36.6 °C) 79 -- 107/55 -- -- -- -- -- --    04/30/25 1459 -- 77 -- 77/46 -- -- -- -- -- --    04/30/25 1443 -- 86 -- 97/49 -- -- -- -- -- --    04/30/25 1401 -- 86 -- 104/61 -- -- -- -- -- --    04/30/25 1344 -- 78 -- 107/57 -- -- -- -- -- --    04/30/25 1329 -- 83 -- 121/62 -- -- -- -- -- --    04/30/25 1317 -- 88 -- 123/60 -- -- -- -- -- --    04/30/25 1314 -- 88 -- 82/53 -- -- -- -- -- --    04/30/25 1259 -- 81 -- 105/59 -- -- -- -- -- --    04/30/25 1245 -- 76 -- 118/64 -- -- -- -- -- --    04/30/25 1229 -- 81 -- 116/63 -- -- -- -- -- --    04/30/25 1214 -- 78 -- 111/63 -- -- -- -- -- --    04/30/25 1159 -- 82 -- 112/61 -- -- -- -- -- --    04/30/25 1152 -- 81 -- 112/60 -- -- -- -- -- --    04/30/25 1129 97.5 °F (36.4 °C) 86 20 96/52 -- -- -- -- -- --    04/30/25 1126 -- 78 -- 109/58 -- -- -- -- -- --    04/30/25 1123 -- 90 -- 114/65 -- -- -- --  -- --    04/30/25 1120 -- 84 -- 112/63 -- -- -- -- -- --    04/30/25 1117 -- 80 -- 117/63 -- -- -- -- -- --    04/30/25 1114 -- 82 -- 112/62 -- -- -- -- -- --    04/30/25 1111 -- 99 -- 113/60 -- -- -- -- -- --    04/30/25 1108 -- 99 -- 111/54 -- -- -- -- -- --    04/30/25 1105 -- 90 -- 109/55 -- -- -- -- -- --    04/30/25 1102 -- 86 -- 107/56 -- -- -- -- -- --    04/30/25 1059 -- 90 -- 98/55 -- -- -- -- -- --    04/30/25 1056 -- 85 -- 105/58 -- -- -- -- -- --    04/30/25 1053 -- 75 -- 98/56 -- -- -- -- -- --    04/30/25 1050 -- 87 -- 93/62 -- -- -- -- -- --    04/30/25 1047 -- 91 -- 91/62 -- -- -- -- -- --    04/30/25 1044 -- 74 -- 102/57 -- -- -- -- -- --    04/30/25 1041 -- 92 -- 99/55 -- -- -- -- -- --    04/30/25 1038 -- 75 -- 99/54 -- -- -- -- -- --    04/30/25 1035 -- 94 -- 107/54 -- -- -- -- -- --    04/30/25 1032 -- 90 -- 110/60 -- -- -- -- -- --    04/30/25 1029 -- 87 -- 105/57 -- -- -- -- -- --    04/30/25 1026 -- 89 -- 103/57 -- -- -- -- -- --    04/30/25 1023 -- 83 -- 106/61 -- -- -- -- -- --    04/30/25 1020 -- 86 -- 99/56 -- -- -- -- -- --    04/30/25 1016 -- 102 -- 97/53 -- -- -- -- -- --    04/30/25 1001 -- 100 -- 117/59 -- -- -- -- -- --    04/30/25 1000 -- -- -- -- -- -- -- -- -- No Pain    04/30/25 0942 -- 95 -- 123/69 -- -- -- -- -- --    04/30/25 0939 -- 90 -- 124/69 -- -- -- -- -- --    04/30/25 0936 -- 92 -- 117/66 -- -- -- -- -- --    04/30/25 0933 -- 103 -- 113/67 -- -- -- -- -- --    04/30/25 0932 -- 92 -- -- -- -- -- -- -- --    04/30/25 0930 97.9 °F (36.6 °C) 93 18 125/65 -- -- -- -- -- --    04/30/25 0928 -- 104 -- -- -- 97 % -- -- -- --    04/30/25 0927 -- 100 -- 122/64 -- -- -- -- -- --    04/30/25 0924 -- 105 -- 116/65 -- -- -- -- -- --    04/30/25 0923 -- 103 -- -- -- 97 % -- -- -- --    04/30/25 0921 -- 113 -- 106/60 -- -- -- -- -- --    04/30/25 0920 -- 106 -- -- -- -- -- -- -- --    04/30/25 0918 -- 100 -- 127/60 -- 97 % -- -- -- --    04/30/25 0916 -- 101 -- -- -- -- -- -- -- --     04/30/25 0915 -- 100 -- 122/59 -- -- -- -- -- --    04/30/25 0913 -- 98 -- -- -- 97 % -- -- -- --    04/30/25 0912 -- 103 -- 120/58 -- -- -- -- -- --    04/30/25 0909 -- 100 -- 123/60 -- -- -- -- -- --    04/30/25 0908 -- 105 -- -- -- 97 % -- -- -- --    04/30/25 0906 -- 102 -- 119/62 -- -- -- -- -- --    04/30/25 0904 -- 104 -- -- -- -- -- -- -- --    04/30/25 0903 -- 107 -- 111/71 -- 98 % -- -- -- --    04/30/25 0900 -- 104 -- 154/64 -- -- -- -- -- 10 - Worst Possible Pain    04/30/25 0858 -- 97 -- 130/79 -- 98 % -- -- -- --    04/30/25 0857 -- 93 -- 130/81 -- -- -- -- -- --    04/30/25 0856 -- 94 -- -- -- -- -- -- -- --    04/30/25 0854 -- 88 -- 130/74 -- -- -- -- -- --    04/30/25 0853 -- 95 -- -- -- 98 % -- -- -- --    04/30/25 0852 -- 98 -- -- -- -- -- -- -- --    04/30/25 0851 -- 93 -- 144/85 -- -- -- -- -- --    04/30/25 0848 -- 106 -- -- -- 96 % -- -- -- --    04/30/25 0847 -- 110 -- 159/98 -- -- -- -- -- --    04/30/25 0844 -- 98 -- 146/58 -- -- -- -- -- --    04/30/25 0843 -- 101 -- -- -- 98 % -- -- -- --    04/30/25 0838 -- 100 -- 133/68 -- 99 % -- -- -- --    04/30/25 0800 -- -- -- -- -- -- None (Room air) WDL -- --    04/30/25 0729 -- 78 -- 139/67 -- -- -- -- -- --    04/30/25 0629 -- 77 -- 117/70 -- -- -- -- -- --    04/30/25 0559 -- 81 -- 128/65 -- -- -- -- -- --    04/30/25 0530 -- 73 -- 121/67 -- -- -- -- -- --    04/30/25 0500 -- 86 -- 138/80 -- -- -- -- -- --    04/30/25 0400 98 °F (36.7 °C) 72 18 134/69 -- -- -- -- Lying --    04/30/25 0330 -- 74 -- 113/73 -- -- -- -- -- --    04/30/25 0300 -- 78 -- 115/66 -- -- -- -- -- --    04/30/25 0227 -- -- -- -- -- -- -- -- -- 6    04/30/25 0200 -- 86 -- 116/67 -- -- -- -- -- --    04/30/25 0100 98.4 °F (36.9 °C) 86 18 115/57 -- -- -- -- Lying --    04/29/25 2225 -- -- -- -- -- -- -- -- -- --    OBSERV: Ray balloon out at 04/29/25 2225 04/29/25 2134 -- -- -- -- -- -- -- -- -- --    OBSERV: Ray Baloon weighted at 04/29/25 2134 04/29/25 2128 --  "-- -- -- -- -- -- -- -- 10 - Worst Possible Pain    04/29/25 1900 -- -- -- 111/55 -- -- -- -- -- --    04/29/25 1712 98.2 °F (36.8 °C) 96 20 105/52 -- -- -- -- Lying --    04/29/25 1708 -- -- -- -- -- -- -- -- -- 5              Pertinent Labs/Diagnostic Test Results:   Radiology:  No orders to display     Cardiology:  No orders to display     GI:  No orders to display           Results from last 7 days   Lab Units 05/02/25  0459 04/29/25  1749   WBC Thousand/uL 16.91* 12.55*   HEMOGLOBIN g/dL 10.9* 12.4   HEMATOCRIT % 34.5* 37.8   PLATELETS Thousands/uL 237 263   TOTAL NEUT ABS Thousands/µL  --  9.19*         Results from last 7 days   Lab Units 05/01/25  0943   SODIUM mmol/L 135   POTASSIUM mmol/L 3.5   CHLORIDE mmol/L 106   CO2 mmol/L 20*   ANION GAP mmol/L 9   BUN mg/dL 9   CREATININE mg/dL 0.66   EGFR ml/min/1.73sq m 115   CALCIUM mg/dL 8.6     Results from last 7 days   Lab Units 05/01/25  0943   AST U/L 20   ALT U/L 14   ALK PHOS U/L 87   TOTAL PROTEIN g/dL 6.6   ALBUMIN g/dL 3.3*   TOTAL BILIRUBIN mg/dL 0.52     Results from last 7 days   Lab Units 05/01/25  0938 05/01/25  0840 05/01/25  0715 05/01/25  0619 05/01/25  0515 05/01/25  0412 05/01/25  0307 05/01/25  0213 05/01/25  0116 05/01/25  0017 04/30/25  2204 04/30/25 2012   POC GLUCOSE mg/dl 100 89 87 89 95 82 89 86 85 80 73 82     Results from last 7 days   Lab Units 05/01/25  0943   GLUCOSE RANDOM mg/dL 94             No results found for: \"BETA-HYDROXYBUTYRATE\"                                                                                       Results from last 7 days   Lab Units 05/01/25  0810 04/27/25 2038   CLARITY UA   --  Clear   COLOR UA   --  Yellow   SPEC GRAV UA   --  >=1.030   PH UA   --  6.0   GLUCOSE UA mg/dl  --  100 (1/10%)*   KETONES UA mg/dl  --  Negative   BLOOD UA   --  Negative   PROTEIN UA mg/dl  --  Negative   NITRITE UA   --  Negative   BILIRUBIN UA   --  Negative   UROBILINOGEN UA E.U./dl  --  0.2   LEUKOCYTES UA   --  Negative "   CREATININE UR mg/dL 53.6  --    PROTEIN UR mg/dL 17.6  --    PROT/CREAT RATIO UR  0.3*  --                                                    Past Medical History:   Diagnosis Date    Migraine      Present on Admission:   Preeclampsia   Diet controlled gestational diabetes mellitus (GDM) in third trimester   Decreased fetal movement during pregnancy in third trimester, antepartum   Macrosomia affecting management of mother in third trimester      Admitting Diagnosis: 37 weeks gestation of pregnancy [Z3A.37]  Encounter for induction of labor [Z34.90]  Encounter for  delivery without indication [O82]  Age/Sex: 35 y.o. female    Network Utilization Review Department  ATTENTION: Please call with any questions or concerns to [phone] and carefully listen to the prompts so that you are directed to the right person. All voicemails are confidential.   For Discharge needs, contact Care Management DC Support Team at [phone] opt. 2  Send all requests for admission clinical reviews, approved or denied determinations and any other requests to dedicated fax number below belonging to the campus where the patient is receiving treatment. List of dedicated fax numbers for the Facilities:  FACILITY NAME UR FAX NUMBER   ADMISSION DENIALS (Administrative/Medical Necessity) [phone]   DISCHARGE SUPPORT TEAM (NETWORK) [phone]   PARENT CHILD HEALTH (Maternity/NICU/Pediatrics) [phone]   [facility] [phone]   [facility] [phone]   [facility] [phone]   [facility] [phone]   [facility] [phone]   [facility] [phone]   [facility] [phone]   [facility] [phone]   [facility] [phone]   [facility]  [phone]   [facility] [phone]   [facility] [phone]

## 2025-05-02 NOTE — PLAN OF CARE
Problem: BIRTH - VAGINAL/ SECTION  Goal: Fetal and maternal status remain reassuring during the birth process  Description: INTERVENTIONS:- Monitor vital signs- Monitor fetal heart rate- Monitor uterine activity- Monitor labor progression (vaginal delivery)- DVT prophylaxis- Antibiotic prophylaxis  Outcome: Progressing  Goal: Emotionally satisfying birthing experience for mother/fetus  Description: Interventions:- Assess, plan, implement and evaluate the nursing care given to the patient in labor- Advocate the philosophy that each childbirth experience is a unique experience and support the family's chosen level of involvement and control during the labor process - Actively participate in both the patient's and family's teaching of the birth process- Consider cultural, Mosque and age-specific factors and plan care for the patient in labor  Outcome: Progressing     Problem: PAIN - ADULT  Goal: Verbalizes/displays adequate comfort level or baseline comfort level  Description: Interventions:- Encourage patient to monitor pain and request assistance- Assess pain using appropriate pain scale- Administer analgesics based on type and severity of pain and evaluate response- Implement non-pharmacological measures as appropriate and evaluate response- Consider cultural and social influences on pain and pain management- Notify physician/advanced practitioner if interventions unsuccessful or patient reports new pain  Outcome: Progressing     Problem: INFECTION - ADULT  Goal: Absence or prevention of progression during hospitalization  Description: INTERVENTIONS:- Assess and monitor for signs and symptoms of infection- Monitor lab/diagnostic results- Monitor all insertion sites, i.e. indwelling lines, tubes, and drains- Monitor endotracheal if appropriate and nasal secretions for changes in amount and color- [place] appropriate cooling/warming therapies per order- Administer medications as ordered- Instruct and  encourage patient and family to use good hand hygiene technique- Identify and instruct in appropriate isolation precautions for identified infection/condition  Outcome: Progressing  Goal: Absence of fever/infection during neutropenic period  Description: INTERVENTIONS:- Monitor WBC  Outcome: Progressing     Problem: SAFETY ADULT  Goal: Patient will remain free of falls  Description: INTERVENTIONS:- Educate patient/family on patient safety including physical limitations- Instruct patient to call for assistance with activity - Consult OT/PT to assist with strengthening/mobility - Keep Call bell within reach- Keep bed low and locked with side rails adjusted as appropriate- Keep care items and personal belongings within reach- Initiate and maintain comfort rounds- Make Fall Risk Sign visible to staff  Outcome: Progressing  Goal: Maintain or return to baseline ADL function  Description: INTERVENTIONS:-  Assess patient's ability to carry out ADLs; assess patient's baseline for ADL function and identify physical deficits which impact ability to perform ADLs (bathing, care of mouth/teeth, toileting, grooming, dressing, etc.)- Assess/evaluate cause of self-care deficits - Assess range of motion- Assess patient's mobility; develop plan if impaired- Assess patient's need for assistive devices and provide as appropriate- Encourage maximum independence but intervene and supervise when necessary- Involve family in performance of ADLs- Assess for home care needs following discharge - Consider OT consult to assist with ADL evaluation and planning for discharge- Provide patient education as appropriate  Outcome: Progressing  Goal: Maintains/Returns to pre admission functional level  Description: INTERVENTIONS:- Perform AM-PAC 6 Click Basic Mobility/ Daily Activity assessment daily.- Set and communicate daily mobility goal to care team and patient/family/caregiver. - Collaborate with rehabilitation services on mobility goals if  consulted- Perform Range of Motion  Outcome: Progressing     Problem: Knowledge Deficit  Goal: Patient/family/caregiver demonstrates understanding of disease process, treatment plan, medications, and discharge instructions  Description: Complete learning assessment and assess knowledge base.Interventions:- Provide teaching at level of understanding- Provide teaching via preferred learning methods  Outcome: Progressing     Problem: DISCHARGE PLANNING  Goal: Discharge to home or other facility with appropriate resources  Description: INTERVENTIONS:- Identify barriers to discharge w/patient and caregiver- Arrange for needed discharge resources and transportation as appropriate- Identify discharge learning needs (meds, wound care, etc.)- Arrange for interpretive services to assist at discharge as needed- Refer to Case Management Department for coordinating discharge planning if the patient needs post-hospital services based on physician/advanced practitioner order or complex needs related to functional status, cognitive ability, or social support system  Outcome: Progressing

## 2025-05-02 NOTE — ASSESSMENT & PLAN NOTE
"No results found for: \"HGBA1C\"    Recent Labs     05/01/25  0619 05/01/25  0715 05/01/25  0840 05/01/25  0938   POCGLU 89 87 89 100       Blood Sugar Average: Last 72 hrs:  (P) [id number]    "

## 2025-05-02 NOTE — PROGRESS NOTES
Progress Note - OB/GYN   Name: Araceli Levy 35 y.o. female I MRN: [id number]  Unit/Bed#: -01 I Date of Admission: 2025   Date of Service: 2025 I Hospital Day: 3     Assessment & Plan  37 weeks gestation of pregnancy      Diet controlled gestational diabetes mellitus (GDM) in third trimester  2hr GTT at 6 week PP visit  Preeclampsia  CBC/CMP wnl, P/C 0.3  Monitor BP's and signs/symptoms of worsening preeclampsia  Consider oral anti-hypertensives for persistently elevated BP's    Systolic (12hrs), Av , Min:120 , Max:139   Diastolic (12hrs), Av, Min:60, Max:87        Decreased fetal movement during pregnancy in third trimester, antepartum    Macrosomia affecting management of mother in third trimester    Rubella non-immune status, antepartum  Offer vaccine postpartum  Status post primary low transverse  section  - QBL: 664 mL, Hgb 12.4 -> 10.9; L hysterotomy extension with [place] on left uterine artery  - Pain well controlled with oral analgesics  - F/u VT, had two voids of 150 mL  - Tolerating PO fluids and solids  - Ambulating without difficulty  - DVT ppx with Lovenox 40mg BID, SCDs  - Contraception: undecided  - Breastfeeding  - Anticipate discharge POD#2-4 pending BP's      OB Post-Partum Progress Note  Subjective   Post delivery. Patient is doing well. Lochia WNL. Pain well controlled. She denies headaches, vision changes, RUQ tenderness.       Pain: yes, cramping, improved with meds  Tolerating PO: yes  Voiding: yes  Flatus: yes  BM: no  Ambulating: yes  Breastfeeding:  yes  Chest pain: no  Shortness of breath: no  Leg pain: no  Lochia: WNL      Objective :  Temp:  [97.5 °F (36.4 °C)-99.8 °F (37.7 °C)] 97.8 °F (36.6 °C)  HR:  [] 96  BP: (109-144)/(55-89) 120/60  Resp:  [18-19] 18  SpO2:  [92 %-99 %] 99 %  O2 Device: None (Room air)    Physical Exam  Vitals reviewed.   Constitutional:       Appearance: Normal appearance.   HENT:      Head: Normocephalic and atraumatic.    Eyes:      Extraocular Movements: Extraocular movements intact.   Cardiovascular:      Rate and Rhythm: Normal rate and regular rhythm.      Pulses: Normal pulses.      Heart sounds: Normal heart sounds.   Pulmonary:      Effort: Pulmonary effort is normal. No respiratory distress.      Breath sounds: Normal breath sounds. No wheezing.   Abdominal:      General: Abdomen is flat. There is no distension.      Palpations: Abdomen is soft.      Tenderness: There is no abdominal tenderness.      Comments: Fundus firm and non-tender  Abdominal padding with old serosanguinous drainage  Incision C/D/I   Musculoskeletal:         General: Normal range of motion.      Cervical back: Normal range of motion.   Skin:     General: Skin is warm and dry.   Neurological:      General: No focal deficit present.      Mental Status: She is alert. Mental status is at baseline.   Psychiatric:         Mood and Affect: Mood normal.         Behavior: Behavior normal.           Lab Results: I have reviewed the following results:  Lab Results   Component Value Date    WBC 16.91 (H) 05/02/2025    HGB 10.9 (L) 05/02/2025    HCT 34.5 (L) 05/02/2025    MCV 83 05/02/2025     05/02/2025     Lev Harvey MD  Obstetrics & Gynecology PGY-1  5/2/2025  5:59 AM

## 2025-05-03 PROCEDURE — 99024 POSTOP FOLLOW-UP VISIT: CPT | Performed by: STUDENT IN AN ORGANIZED HEALTH CARE EDUCATION/TRAINING PROGRAM

## 2025-05-03 RX ORDER — BISACODYL 10 MG
10 SUPPOSITORY, RECTAL RECTAL DAILY
Status: DISCONTINUED | OUTPATIENT
Start: 2025-05-03 | End: 2025-05-04 | Stop reason: HOSPADM

## 2025-05-03 RX ADMIN — DOCUSATE SODIUM 100 MG: 100 CAPSULE, LIQUID FILLED ORAL at 08:48

## 2025-05-03 RX ADMIN — IBUPROFEN 600 MG: 600 TABLET, FILM COATED ORAL at 20:07

## 2025-05-03 RX ADMIN — OXYCODONE HYDROCHLORIDE 10 MG: 10 TABLET ORAL at 01:42

## 2025-05-03 RX ADMIN — METRONIDAZOLE 500 MG: 500 TABLET ORAL at 06:44

## 2025-05-03 RX ADMIN — CEPHALEXIN 500 MG: 500 CAPSULE ORAL at 08:48

## 2025-05-03 RX ADMIN — OXYCODONE HYDROCHLORIDE 10 MG: 10 TABLET ORAL at 20:07

## 2025-05-03 RX ADMIN — Medication 1 TABLET: at 08:48

## 2025-05-03 RX ADMIN — ENOXAPARIN SODIUM 40 MG: 40 INJECTION SUBCUTANEOUS at 08:48

## 2025-05-03 RX ADMIN — OXYCODONE HYDROCHLORIDE 10 MG: 10 TABLET ORAL at 08:49

## 2025-05-03 RX ADMIN — LIDOCAINE 1 PATCH: 50 PATCH CUTANEOUS at 08:49

## 2025-05-03 RX ADMIN — IBUPROFEN 600 MG: 600 TABLET, FILM COATED ORAL at 06:44

## 2025-05-03 RX ADMIN — IBUPROFEN 600 MG: 600 TABLET, FILM COATED ORAL at 13:57

## 2025-05-03 RX ADMIN — ACETAMINOPHEN 650 MG: 325 TABLET, FILM COATED ORAL at 04:38

## 2025-05-03 RX ADMIN — ACETAMINOPHEN 650 MG: 325 TABLET, FILM COATED ORAL at 18:38

## 2025-05-03 RX ADMIN — IBUPROFEN 600 MG: 600 TABLET, FILM COATED ORAL at 01:10

## 2025-05-03 RX ADMIN — ENOXAPARIN SODIUM 40 MG: 40 INJECTION SUBCUTANEOUS at 20:07

## 2025-05-03 RX ADMIN — OXYCODONE HYDROCHLORIDE 10 MG: 10 TABLET ORAL at 13:58

## 2025-05-03 NOTE — ASSESSMENT & PLAN NOTE
- QBL: 664 mL, Hgb 12.4 -> 10.9; L hysterotomy extension with [place] on left uterine artery  - Pain well controlled with oral analgesics  - Voiding spontaneously  - Tolerating PO fluids and solids  - Ambulating without difficulty  - DVT ppx with Lovenox 40mg BID, SCDs  - Contraception: undecided  - Breastfeeding  - Anticipate discharge POD#2-4 pending BP's

## 2025-05-03 NOTE — PLAN OF CARE
Problem: PAIN - ADULT  Goal: Verbalizes/displays adequate comfort level or baseline comfort level  Description: Interventions:- Encourage patient to monitor pain and request assistance- Assess pain using appropriate pain scale- Administer analgesics based on type and severity of pain and evaluate response- Implement non-pharmacological measures as appropriate and evaluate response- Consider cultural and social influences on pain and pain management- Notify physician/advanced practitioner if interventions unsuccessful or patient reports new pain  5/3/2025 0254 by Shira Villanueva RN  Outcome: Progressing  5/3/2025 0254 by Shira Villanueva RN  Outcome: Progressing     Problem: INFECTION - ADULT  Goal: Absence or prevention of progression during hospitalization  Description: INTERVENTIONS:- Assess and monitor for signs and symptoms of infection- Monitor lab/diagnostic results- Monitor all insertion sites, i.e. indwelling lines, tubes, and drains- Monitor endotracheal if appropriate and nasal secretions for changes in amount and color- [place] appropriate cooling/warming therapies per order- Administer medications as ordered- Instruct and encourage patient and family to use good hand hygiene technique- Identify and instruct in appropriate isolation precautions for identified infection/condition  5/3/2025 0254 by Shira Villanueva RN  Outcome: Progressing  5/3/2025 0254 by Shira Villanueva RN  Outcome: Progressing  Goal: Absence of fever/infection during neutropenic period  Description: INTERVENTIONS:- Monitor WBC  5/3/2025 0254 by Shira Villanueva RN  Outcome: Progressing  5/3/2025 0254 by Shira Villanueva RN  Outcome: Progressing     Problem: SAFETY ADULT  Goal: Patient will remain free of falls  Description: INTERVENTIONS:- Educate patient/family on patient safety including physical limitations- Instruct patient to call for assistance with activity - Consult OT/PT to assist with strengthening/mobility - Keep Call bell within  reach- Keep bed low and locked with side rails adjusted as appropriate- Keep care items and personal belongings within reach- Initiate and maintain comfort rounds- Make Fall Risk Sign visible to staff  5/3/2025 0254 by Shira Villanueva RN  Outcome: Progressing  5/3/2025 0254 by Shira Villanueva RN  Outcome: Progressing  Goal: Maintain or return to baseline ADL function  Description: INTERVENTIONS:-  Assess patient's ability to carry out ADLs; assess patient's baseline for ADL function and identify physical deficits which impact ability to perform ADLs (bathing, care of mouth/teeth, toileting, grooming, dressing, etc.)- Assess/evaluate cause of self-care deficits - Assess range of motion- Assess patient's mobility; develop plan if impaired- Assess patient's need for assistive devices and provide as appropriate- Encourage maximum independence but intervene and supervise when necessary- Involve family in performance of ADLs- Assess for home care needs following discharge - Consider OT consult to assist with ADL evaluation and planning for discharge- Provide patient education as appropriate  5/3/2025 0254 by Shira Villanueva RN  Outcome: Progressing  5/3/2025 0254 by Shira Villanueva RN  Outcome: Progressing  Goal: Maintains/Returns to pre admission functional level  Description: INTERVENTIONS:- Perform AM-PAC 6 Click Basic Mobility/ Daily Activity assessment daily.- Set and communicate daily mobility goal to care team and patient/family/caregiver. - Collaborate with rehabilitation services on mobility goals if consulted- Perform Range of Motion  5/3/2025 0254 by Shira Villanueva RN  Outcome: Progressing  5/3/2025 0254 by Shira Villanueva RN  Outcome: Progressing     Problem: Knowledge Deficit  Goal: Patient/family/caregiver demonstrates understanding of disease process, treatment plan, medications, and discharge instructions  Description: Complete learning assessment and assess knowledge base.Interventions:- Provide teaching at  level of understanding- Provide teaching via preferred learning methods  5/3/2025 0254 by Shira Villanueva RN  Outcome: Progressing  5/3/2025 0254 by Shira Villanueva RN  Outcome: Progressing     Problem: DISCHARGE PLANNING  Goal: Discharge to home or other facility with appropriate resources  Description: INTERVENTIONS:- Identify barriers to discharge w/patient and caregiver- Arrange for needed discharge resources and transportation as appropriate- Identify discharge learning needs (meds, wound care, etc.)- Arrange for interpretive services to assist at discharge as needed- Refer to Case Management Department for coordinating discharge planning if the patient needs post-hospital services based on physician/advanced practitioner order or complex needs related to functional status, cognitive ability, or social support system  5/3/2025 0254 by Shira Villanueva RN  Outcome: Progressing  5/3/2025 0254 by Shira Villanueva RN  Outcome: Progressing     Problem: POSTPARTUM  Goal: Experiences normal postpartum course  Description: INTERVENTIONS:- Monitor maternal vital signs- Assess uterine involution and lochia  Outcome: Progressing  Goal: Appropriate maternal -  bonding  Description: INTERVENTIONS:- Identify family support- Assess for appropriate maternal/infant bonding -Encourage maternal/infant bonding opportunities- Referral to  or  as needed  Outcome: Progressing  Goal: Establishment of infant feeding pattern  Description: INTERVENTIONS:- Assess breast/bottle feeding- Refer to lactation as needed  Outcome: Progressing  Goal: Incision(s), wounds(s) or drain site(s) healing without S/S of infection  Description: INTERVENTIONS- Assess and document dressing, incision, wound bed, drain sites and surrounding tissue- Provide patient and family education- Perform skin care/dressing changes every  Outcome: Progressing     Problem: BIRTH - VAGINAL/ SECTION  Goal: Fetal and maternal status remain  reassuring during the birth process  Description: INTERVENTIONS:- Monitor vital signs- Monitor fetal heart rate- Monitor uterine activity- Monitor labor progression (vaginal delivery)- DVT prophylaxis- Antibiotic prophylaxis  5/3/2025 0254 by Shira Villanueva RN  Outcome: Completed  5/3/2025 0254 by Shira Villanueva RN  Outcome: Progressing  Goal: Emotionally satisfying birthing experience for mother/fetus  Description: Interventions:- Assess, plan, implement and evaluate the nursing care given to the patient in labor- Advocate the philosophy that each childbirth experience is a unique experience and support the family's chosen level of involvement and control during the labor process - Actively participate in both the patient's and family's teaching of the birth process- Consider cultural, Jew and age-specific factors and plan care for the patient in labor  5/3/2025 0254 by Shira Villanueva RN  Outcome: Completed  5/3/2025 0254 by Shira Villanueva RN  Outcome: Progressing

## 2025-05-03 NOTE — PLAN OF CARE
Problem: POSTPARTUM  Goal: Experiences normal postpartum course  Description: INTERVENTIONS:- Monitor maternal vital signs- Assess uterine involution and lochia  Outcome: Progressing  Goal: Appropriate maternal -  bonding  Description: INTERVENTIONS:- Identify family support- Assess for appropriate maternal/infant bonding -Encourage maternal/infant bonding opportunities- Referral to  or  as needed  Outcome: Progressing  Goal: Establishment of infant feeding pattern  Description: INTERVENTIONS:- Assess breast/bottle feeding- Refer to lactation as needed  Outcome: Progressing  Goal: Incision(s), wounds(s) or drain site(s) healing without S/S of infection  Description: INTERVENTIONS- Assess and document dressing, incision, wound bed, drain sites and surrounding tissue- Provide patient and family education- Perform skin care/dressing changes every  Outcome: Progressing     Problem: PAIN - ADULT  Goal: Verbalizes/displays adequate comfort level or baseline comfort level  Description: Interventions:- Encourage patient to monitor pain and request assistance- Assess pain using appropriate pain scale- Administer analgesics based on type and severity of pain and evaluate response- Implement non-pharmacological measures as appropriate and evaluate response- Consider cultural and social influences on pain and pain management- Notify physician/advanced practitioner if interventions unsuccessful or patient reports new pain  Outcome: Progressing     Problem: INFECTION - ADULT  Goal: Absence or prevention of progression during hospitalization  Description: INTERVENTIONS:- Assess and monitor for signs and symptoms of infection- Monitor lab/diagnostic results- Monitor all insertion sites, i.e. indwelling lines, tubes, and drains- Monitor endotracheal if appropriate and nasal secretions for changes in amount and color- [place] appropriate cooling/warming therapies per order- Administer medications  as ordered- Instruct and encourage patient and family to use good hand hygiene technique- Identify and instruct in appropriate isolation precautions for identified infection/condition  Outcome: Progressing  Goal: Absence of fever/infection during neutropenic period  Description: INTERVENTIONS:- Monitor WBC  Outcome: Progressing     Problem: SAFETY ADULT  Goal: Patient will remain free of falls  Description: INTERVENTIONS:- Educate patient/family on patient safety including physical limitations- Instruct patient to call for assistance with activity - Consult OT/PT to assist with strengthening/mobility - Keep Call bell within reach- Keep bed low and locked with side rails adjusted as appropriate- Keep care items and personal belongings within reach- Initiate and maintain comfort rounds- Make Fall Risk Sign visible to staff  Outcome: Progressing  Goal: Maintain or return to baseline ADL function  Description: INTERVENTIONS:-  Assess patient's ability to carry out ADLs; assess patient's baseline for ADL function and identify physical deficits which impact ability to perform ADLs (bathing, care of mouth/teeth, toileting, grooming, dressing, etc.)- Assess/evaluate cause of self-care deficits - Assess range of motion- Assess patient's mobility; develop plan if impaired- Assess patient's need for assistive devices and provide as appropriate- Encourage maximum independence but intervene and supervise when necessary- Involve family in performance of ADLs- Assess for home care needs following discharge - Consider OT consult to assist with ADL evaluation and planning for discharge- Provide patient education as appropriate  Outcome: Progressing  Goal: Maintains/Returns to pre admission functional level  Description: INTERVENTIONS:- Perform AM-PAC 6 Click Basic Mobility/ Daily Activity assessment daily.- Set and communicate daily mobility goal to care team and patient/family/caregiver. - Collaborate with rehabilitation services  on mobility goals if consulted- Perform Range of Motion  Outcome: Progressing     Problem: Knowledge Deficit  Goal: Patient/family/caregiver demonstrates understanding of disease process, treatment plan, medications, and discharge instructions  Description: Complete learning assessment and assess knowledge base.Interventions:- Provide teaching at level of understanding- Provide teaching via preferred learning methods  Outcome: Progressing     Problem: DISCHARGE PLANNING  Goal: Discharge to home or other facility with appropriate resources  Description: INTERVENTIONS:- Identify barriers to discharge w/patient and caregiver- Arrange for needed discharge resources and transportation as appropriate- Identify discharge learning needs (meds, wound care, etc.)- Arrange for interpretive services to assist at discharge as needed- Refer to Case Management Department for coordinating discharge planning if the patient needs post-hospital services based on physician/advanced practitioner order or complex needs related to functional status, cognitive ability, or social support system  Outcome: Progressing

## 2025-05-03 NOTE — CASE MANAGEMENT
Case Management Progress Note    Patient name Araceli Levy  Location /-01 MRN [id number]  : 1990 Date 5/3/2025       LOS (days): 4  Geometric Mean LOS (GMLOS) (days):   Days to GMLOS:        OBJECTIVE:        Current admission status: Inpatient  Preferred Pharmacy:   [facility]/pharmacy #[id number] - [place], [place] - [address]  [address] [place] [address]  Phone: [phone] Fax: [phone]    Primary Care Provider: No primary care provider on file.    Primary Insurance: ALISIA DUVAL  Secondary Insurance:     PROGRESS NOTE:    Consult for MOB's Breast Pump:    VI BRITO placed order for Spectra S2 breast pump for room delivery via [facility] on [address]. No additional case management needs reported; CM will continue to follow.

## 2025-05-03 NOTE — ASSESSMENT & PLAN NOTE
CBC/CMP wnl, P/C 0.3  Monitor BP's and signs/symptoms of worsening preeclampsia    Systolic (12hrs), Av , Min:115 , Max:135   Diastolic (12hrs), Av, Min:63, Max:70

## 2025-05-03 NOTE — LACTATION NOTE
This note was copied from a baby's chart.  CONSULT - LACTATION  Baby Girl Levy (Christine) 2 days female MRN: [id number]    [facility] AN NURSERY Room / Bed: (N)/(N) Encounter: [id number]    Maternal Information     MOTHER:  Araceli Levy  Maternal Age: 35 y.o.  OB History: # 1 - Date: , Sex: None, Weight: None, GA: None, Type: Biochemical, Apgar1: None, Apgar5: None, Living: None, Birth Comments: None    # 2 - Date: 25, Sex: Female, Weight: 3150 g (6 lb 15.1 oz), GA: 37w5d, Type: , Low Transverse, Apgar1: 8, Apgar5: 9, Living: Living, Birth Comments: None   Previous breast reduction surgery? No    Lactation history:   Has patient previously breast fed: No   How long had patient previously breast fed:     Previous breast feeding complications:       Past Surgical History:   Procedure Laterality Date    OTHER SURGICAL HISTORY Right     Arm Fractured bone at 6 y.o       Birth information:  YOB: 2024   Time of birth: 10:35 AM   Sex: female   Delivery type: , Low Transverse   Birth Weight: 3150 g (6 lb 15.1 oz)   Percent of Weight Change: -5%     Gestational Age: 37w5d   [unfilled]    Reason for Consult:    Reason for Consult: Initial assessment (routine) - 10 min, Pump Follow Up - 5 min    Risk Factors:         Breast and nipple assessment:   Breasts/Nipples  Left Breast: Soft  Right Breast: Soft  Left Nipple: Everted, Flat, Cracked  Right Nipple: Everted, Flat, Tender  Intervention: Lanolin, Breast pump  Breastfeeding Status: Yes  Breastfeeding Progress: Not yet established    OB Lactation Tools:    Other OB Lactation Tools  Feeding Devices: Bottle, Pump, Syringe    Breast Pump:    Breast Pump  Pump: Personal, Electric, Manual (cm order for spectra s2)  Pump Review/Education: Setup, frequency, and cleaning, Milk storage  Initiated by: intially by nursing; reviewed by Sacha Wesley  Date Initiated: 25      [place]  Assessment:  sleeping on FOB    Feeding assessment: latch difficulty (MOB reports a good latch this AM but that baby gets frustrated at breast; no latch viewed)    Feeding recommendations:   breastfeed on demand from both breasts; offer formula via paced bottle if no latch; pump when bottle given    Met with Araceli whose feeding intention is exclusive breastfeeding and expressed breast milk feeding. She has been offering bottles of formula as baby is frustrated at the breast. Parents have been offering appropriate amounts of formula via paced bottle per feed per day of life. Araceli reports that baby latched well for her this morning. Araceli reports that she was told to pump 2 times a day. Recommended pumping every 2-3 hours or when baby gets a bottle. Discussed using comfortable suction settings, a smaller flange insert, and lubricating the flange when pumping. Recommended attempting a latch on both breasts before offering a bottle. Encouraged reaching out for lactation support when baby shows cues next.    Sacha Wesley MA 5/3/2025 7:34 PM

## 2025-05-03 NOTE — ASSESSMENT & PLAN NOTE
CBC/CMP wnl, P/C 0.3  Monitor BP's and signs/symptoms of worsening preeclampsia  Consider oral anti-hypertensives for persistently elevated BP's    Systolic (12hrs), Av , Min:115 , Max:135   Diastolic (12hrs), Av, Min:63, Max:70

## 2025-05-03 NOTE — ASSESSMENT & PLAN NOTE
- QBL: 664 mL, Hgb 12.4 -> 10.9; L hysterotomy extension with [place] on left uterine artery  - Pain well controlled with oral analgesics  - Voiding spontaneously   - Tolerating PO fluids and solids  - Ambulating without difficulty  - dulcolax suppository due to constipation   - DVT ppx with Lovenox 40mg BID, SCDs  - Contraception: undecided  - Breastfeeding  - Anticipate discharge POD#2-4 pending BP's

## 2025-05-03 NOTE — PROGRESS NOTES
Progress Note - OB/GYN   Name: Araceli Levy 35 y.o. female I MRN: [id number]  Unit/Bed#: -01 I Date of Admission: 2025   Date of Service: 5/3/2025 I Hospital Day: 4     Assessment & Plan  Diet controlled gestational diabetes mellitus (GDM) in third trimester  2hr GTT at 6 week PP visit  Preeclampsia  CBC/CMP wnl, P/C 0.3  Monitor BP's and signs/symptoms of worsening preeclampsia    Systolic (12hrs), Av , Min:115 , Max:135   Diastolic (12hrs), Av, Min:63, Max:70    Rubella non-immune status, antepartum  Offer vaccine postpartum  Status post primary low transverse  section  - QBL: 664 mL, Hgb 12.4 -> 10.9; L hysterotomy extension with [place] on left uterine artery  - Pain well controlled with oral analgesics  - Voiding spontaneously  - Tolerating PO fluids and solids  - Ambulating without difficulty  - DVT ppx with Lovenox 40mg BID, SCDs  - Contraception: undecided  - Breastfeeding  - Anticipate discharge POD#2-4 pending BP's      OB Post-Partum Progress Note  Subjective   Post delivery. Patient is doing well. Lochia WNL. She denies headaches, vision changes, RUQ tenderness. Her pain is not well controlled. She reports her incision feels like it is tearing apart.       Pain: yes, cramping, improved with meds  Tolerating PO: yes  Voiding: yes  Flatus: yes  BM: no  Ambulating: yes  Breastfeeding:  yes  Chest pain: no  Shortness of breath: no  Leg pain: no  Lochia: WNL      Objective :  Temp:  [97.7 °F (36.5 °C)-98.3 °F (36.8 °C)] 97.7 °F (36.5 °C)  HR:  [] 89  BP: (111-135)/(63-75) 115/63  Resp:  [18-20] 18  SpO2:  [97 %-98 %] 98 %  O2 Device: None (Room air)    Physical Exam  Vitals reviewed.   Constitutional:       Appearance: Normal appearance.   HENT:      Head: Normocephalic and atraumatic.   Eyes:      Extraocular Movements: Extraocular movements intact.   Cardiovascular:      Rate and Rhythm: Normal rate and regular rhythm.      Pulses: Normal pulses.      Heart sounds:  Normal heart sounds.   Pulmonary:      Effort: Pulmonary effort is normal. No respiratory distress.      Breath sounds: Normal breath sounds. No wheezing.   Abdominal:      General: Abdomen is flat. There is no distension.      Palpations: Abdomen is soft.      Tenderness: There is no abdominal tenderness.      Comments: Fundus firm and non-tender  Incision C/D/I   Musculoskeletal:         General: Normal range of motion.      Cervical back: Normal range of motion.   Skin:     General: Skin is warm and dry.   Neurological:      General: No focal deficit present.      Mental Status: She is alert. Mental status is at baseline.   Psychiatric:         Mood and Affect: Mood normal.         Behavior: Behavior normal.           Lab Results: I have reviewed the following results:  Lab Results   Component Value Date    WBC 16.91 (H) 05/02/2025    HGB 10.9 (L) 05/02/2025    HCT 34.5 (L) 05/02/2025    MCV 83 05/02/2025     05/02/2025     Jinny Pruitt  PGY-1 OB/GYN  05/03/25  7:59 AM

## 2025-05-03 NOTE — PROGRESS NOTES
Progress Note - OB/GYN   Name: Araceli Levy 35 y.o. female I MRN: [id number]  Unit/Bed#: -01 I Date of Admission: 2025   Date of Service: 5/3/2025 I Hospital Day: 4     Assessment & Plan  Status post primary low transverse  section  - QBL: 664 mL, Hgb 12.4 -> 10.9; L hysterotomy extension with [place] on left uterine artery  - Pain well controlled with oral analgesics  - Voiding spontaneously   - Tolerating PO fluids and solids  - Ambulating without difficulty  - dulcolax suppository due to constipation   - DVT ppx with Lovenox 40mg BID, SCDs  - Contraception: undecided  - Breastfeeding  - Anticipate discharge POD#2-4 pending BP's    Preeclampsia  CBC/CMP wnl, P/C 0.3  Monitor BP's and signs/symptoms of worsening preeclampsia  Consider oral anti-hypertensives for persistently elevated BP's    Systolic (12hrs), Av , Min:115 , Max:135   Diastolic (12hrs), Av, Min:63, Max:70        Diet controlled gestational diabetes mellitus (GDM) in third trimester  2hr GTT at 6 week PP visit  Rubella non-immune status, antepartum  MMR ordered    OB Post-Partum Progress Note  Subjective   Post delivery. Patient is doing well. Lochia WNL. Pain well controlled.     Pain: yes, cramping, improved with meds  Tolerating PO: yes  Voiding: yes  Flatus: yes  BM: yes, with rectal bleeding due to constipation  Ambulating: yes  Breastfeeding:  yes  Chest pain: no  Shortness of breath: no  Leg pain: no  Lochia: WNL    Objective :  Temp:  [97.7 °F (36.5 °C)-98.3 °F (36.8 °C)] 97.7 °F (36.5 °C)  HR:  [] 89  BP: (111-135)/(63-75) 115/63  Resp:  [18-20] 18  SpO2:  [97 %-98 %] 98 %  O2 Device: None (Room air)    Physical Exam  Constitutional:       General: She is not in acute distress.     Appearance: Normal appearance. She is not ill-appearing.   Cardiovascular:      Rate and Rhythm: Normal rate.   Pulmonary:      Effort: Pulmonary effort is normal. No respiratory distress.   Abdominal:      General: Abdomen  is flat. There is no distension.      Palpations: Abdomen is soft.      Tenderness: There is no abdominal tenderness. There is no guarding or rebound.   Genitourinary:     Comments: Incision clean, dry, intact   Musculoskeletal:      Right lower leg: No edema.      Left lower leg: No edema.   Neurological:      General: No focal deficit present.      Mental Status: She is alert and oriented to person, place, and time.   Psychiatric:         Mood and Affect: Mood normal.         Behavior: Behavior normal.         Thought Content: Thought content normal.         Judgment: Judgment normal.           Lab Results: I have reviewed the following results:  Lab Results   Component Value Date    WBC 16.91 (H) 05/02/2025    HGB 10.9 (L) 05/02/2025    HCT 34.5 (L) 05/02/2025    MCV 83 05/02/2025     05/02/2025

## 2025-05-04 VITALS
DIASTOLIC BLOOD PRESSURE: 76 MMHG | SYSTOLIC BLOOD PRESSURE: 127 MMHG | BODY MASS INDEX: 47.65 KG/M2 | HEIGHT: 65 IN | TEMPERATURE: 98 F | HEART RATE: 93 BPM | WEIGHT: 286 LBS | OXYGEN SATURATION: 97 % | RESPIRATION RATE: 18 BRPM

## 2025-05-04 LAB
DME PARACHUTE DELIVERY DATE ACTUAL: NORMAL
DME PARACHUTE DELIVERY DATE REQUESTED: NORMAL
DME PARACHUTE ITEM DESCRIPTION: NORMAL
DME PARACHUTE ORDER STATUS: NORMAL
DME PARACHUTE SUPPLIER NAME: NORMAL
DME PARACHUTE SUPPLIER PHONE: NORMAL

## 2025-05-04 PROCEDURE — NC001 PR NO CHARGE: Performed by: OBSTETRICS & GYNECOLOGY

## 2025-05-04 PROCEDURE — 99024 POSTOP FOLLOW-UP VISIT: CPT | Performed by: OBSTETRICS & GYNECOLOGY

## 2025-05-04 RX ORDER — LIDOCAINE 50 MG/G
1 PATCH TOPICAL DAILY
Qty: 5 PATCH | Refills: 0 | Status: SHIPPED | OUTPATIENT
Start: 2025-05-05

## 2025-05-04 RX ORDER — DOCUSATE SODIUM 100 MG/1
100 CAPSULE, LIQUID FILLED ORAL 2 TIMES DAILY
Start: 2025-05-04

## 2025-05-04 RX ORDER — BENZOCAINE/MENTHOL 6 MG-10 MG
1 LOZENGE MUCOUS MEMBRANE 4 TIMES DAILY PRN
Start: 2025-05-04

## 2025-05-04 RX ORDER — IBUPROFEN 600 MG/1
600 TABLET, FILM COATED ORAL EVERY 6 HOURS SCHEDULED
Qty: 30 TABLET | Refills: 2 | Status: SHIPPED | OUTPATIENT
Start: 2025-05-04

## 2025-05-04 RX ORDER — OXYCODONE HYDROCHLORIDE 5 MG/1
5 TABLET ORAL EVERY 6 HOURS PRN
Qty: 15 TABLET | Refills: 0 | Status: SHIPPED | OUTPATIENT
Start: 2025-05-04 | End: 2025-05-14

## 2025-05-04 RX ORDER — ACETAMINOPHEN 325 MG/1
650 TABLET ORAL EVERY 6 HOURS SCHEDULED
Qty: 30 TABLET | Refills: 0 | Status: SHIPPED | OUTPATIENT
Start: 2025-05-04 | End: 2025-05-18

## 2025-05-04 RX ADMIN — ACETAMINOPHEN 650 MG: 325 TABLET, FILM COATED ORAL at 07:58

## 2025-05-04 RX ADMIN — IBUPROFEN 600 MG: 600 TABLET, FILM COATED ORAL at 10:21

## 2025-05-04 RX ADMIN — ACETAMINOPHEN 650 MG: 325 TABLET, FILM COATED ORAL at 00:42

## 2025-05-04 RX ADMIN — OXYCODONE HYDROCHLORIDE 5 MG: 5 TABLET ORAL at 00:42

## 2025-05-04 RX ADMIN — OXYCODONE HYDROCHLORIDE 5 MG: 5 TABLET ORAL at 04:52

## 2025-05-04 RX ADMIN — OXYCODONE HYDROCHLORIDE 10 MG: 10 TABLET ORAL at 12:16

## 2025-05-04 RX ADMIN — IBUPROFEN 600 MG: 600 TABLET, FILM COATED ORAL at 04:52

## 2025-05-04 RX ADMIN — ENOXAPARIN SODIUM 40 MG: 40 INJECTION SUBCUTANEOUS at 08:05

## 2025-05-04 RX ADMIN — LIDOCAINE 1 PATCH: 50 PATCH CUTANEOUS at 08:04

## 2025-05-04 RX ADMIN — Medication 1 TABLET: at 07:58

## 2025-05-04 NOTE — PLAN OF CARE
Problem: POSTPARTUM  Goal: Experiences normal postpartum course  Description: INTERVENTIONS:- Monitor maternal vital signs- Assess uterine involution and lochia  Outcome: Progressing  Goal: Appropriate maternal -  bonding  Description: INTERVENTIONS:- Identify family support- Assess for appropriate maternal/infant bonding -Encourage maternal/infant bonding opportunities- Referral to  or  as needed  Outcome: Progressing  Goal: Establishment of infant feeding pattern  Description: INTERVENTIONS:- Assess breast/bottle feeding- Refer to lactation as needed  Outcome: Progressing  Goal: Incision(s), wounds(s) or drain site(s) healing without S/S of infection  Description: INTERVENTIONS- Assess and document dressing, incision, wound bed, drain sites and surrounding tissue- Provide patient and family education  Outcome: Progressing     Problem: PAIN - ADULT  Goal: Verbalizes/displays adequate comfort level or baseline comfort level  Description: Interventions:- Encourage patient to monitor pain and request assistance- Assess pain using appropriate pain scale- Administer analgesics based on type and severity of pain and evaluate response- Implement non-pharmacological measures as appropriate and evaluate response- Consider cultural and social influences on pain and pain management- Notify physician/advanced practitioner if interventions unsuccessful or patient reports new pain  Outcome: Progressing     Problem: INFECTION - ADULT  Goal: Absence or prevention of progression during hospitalization  Description: INTERVENTIONS:- Assess and monitor for signs and symptoms of infection- Monitor lab/diagnostic results- Monitor all insertion sites, i.e. indwelling lines, tubes, and drains- Monitor endotracheal if appropriate and nasal secretions for changes in amount and color- [place] appropriate cooling/warming therapies per order- Administer medications as ordered- Instruct and encourage patient  and family to use good hand hygiene technique- Identify and instruct in appropriate isolation precautions for identified infection/condition  Outcome: Progressing  Goal: Absence of fever/infection during neutropenic period  Description: INTERVENTIONS:- Monitor WBC  Outcome: Progressing     Problem: SAFETY ADULT  Goal: Patient will remain free of falls  Description: INTERVENTIONS:- Educate patient/family on patient safety including physical limitations- Instruct patient to call for assistance with activity - Consult OT/PT to assist with strengthening/mobility - Keep Call bell within reach- Keep bed low and locked with side rails adjusted as appropriate- Keep care items and personal belongings within reach- Initiate and maintain comfort rounds- Make Fall Risk Sign visible to staff  Outcome: Progressing  Goal: Maintain or return to baseline ADL function  Description: INTERVENTIONS:-  Assess patient's ability to carry out ADLs; assess patient's baseline for ADL function and identify physical deficits which impact ability to perform ADLs (bathing, care of mouth/teeth, toileting, grooming, dressing, etc.)- Assess/evaluate cause of self-care deficits - Assess range of motion- Assess patient's mobility; develop plan if impaired- Assess patient's need for assistive devices and provide as appropriate- Encourage maximum independence but intervene and supervise when necessary- Involve family in performance of ADLs- Assess for home care needs following discharge - Consider OT consult to assist with ADL evaluation and planning for discharge- Provide patient education as appropriate  Outcome: Progressing  Goal: Maintains/Returns to pre admission functional level  Description: INTERVENTIONS:- Perform AM-PAC 6 Click Basic Mobility/ Daily Activity assessment daily.- Set and communicate daily mobility goal to care team and patient/family/caregiver. - Collaborate with rehabilitation services on mobility goals if consulted- Perform  Range of Motion  Outcome: Progressing     Problem: Knowledge Deficit  Goal: Patient/family/caregiver demonstrates understanding of disease process, treatment plan, medications, and discharge instructions  Description: Complete learning assessment and assess knowledge base.Interventions:- Provide teaching at level of understanding- Provide teaching via preferred learning methods  Outcome: Progressing     Problem: DISCHARGE PLANNING  Goal: Discharge to home or other facility with appropriate resources  Description: INTERVENTIONS:- Identify barriers to discharge w/patient and caregiver- Arrange for needed discharge resources and transportation as appropriate- Identify discharge learning needs (meds, wound care, etc.)- Arrange for interpretive services to assist at discharge as needed- Refer to Case Management Department for coordinating discharge planning if the patient needs post-hospital services based on physician/advanced practitioner order or complex needs related to functional status, cognitive ability, or social support system  Outcome: Progressing

## 2025-05-04 NOTE — ASSESSMENT & PLAN NOTE
- QBL: 664 mL, Hgb 12.4 -> 10.9; L hysterotomy extension with [place] on left uterine artery  - Voiding spontaneously   - Passing gas, having BM  - Tolerating PO fluids and solids  - Ambulating without difficulty  - DVT ppx with Lovenox 40mg BID, SCDs  - Contraception: undecided  - Breastfeeding

## 2025-05-04 NOTE — PROGRESS NOTES
Progress Note - OB/GYN   Name: Araceli Levy 35 y.o. female I MRN: [id number]  Unit/Bed#: -01 I Date of Admission: 2025   Date of Service: 2025 I Hospital Day: 5     Assessment & Plan  Status post primary low transverse  section  - QBL: 664 mL, Hgb 12.4 -> 10.9; L hysterotomy extension with [place] on left uterine artery  - Voiding spontaneously   - Passing gas, having BM  - Tolerating PO fluids and solids  - Ambulating without difficulty  - DVT ppx with Lovenox 40mg BID, SCDs  - Contraception: undecided  - Breastfeeding    Preeclampsia  CBC/CMP wnl, P/C 0.3  Monitor BP's and signs/symptoms of worsening preeclampsia    Systolic (12hrs), Av , Min:121 , Max:121   Diastolic (12hrs), Av, Min:56, Max:56    Diet controlled gestational diabetes mellitus (GDM) in third trimester  2hr GTT at 6 week PP visit  Rubella non-immune status, antepartum  MMR ordered    OB Post-Partum Progress Note  Subjective   Post delivery. Patient is doing well. Lochia WNL. Pain in her incision is significant. She reports burning since she showered this morning.     Pain: yes, cramping, improved with meds  Tolerating PO: yes  Voiding: yes  Flatus: yes  BM: yes  Ambulating: yes  Breastfeeding:  yes  Chest pain: no  Shortness of breath: no  Leg pain: no  Lochia: WNL    Objective :  Temp:  [97.5 °F (36.4 °C)-98.8 °F (37.1 °C)] 97.5 °F (36.4 °C)  HR:  [] 104  BP: (119-121)/(53-79) 121/56  Resp:  [18-20] 20  SpO2:  [96 %-100 %] 100 %  O2 Device: None (Room air)    Physical Exam  Constitutional:       General: She is not in acute distress.     Appearance: Normal appearance. She is not ill-appearing.   Cardiovascular:      Rate and Rhythm: Normal rate.   Pulmonary:      Effort: Pulmonary effort is normal. No respiratory distress.   Abdominal:      General: Abdomen is flat. There is no distension.      Palpations: Abdomen is soft.      Tenderness: There is no abdominal tenderness. There is no guarding or rebound.       Comments: Incision clean, dry, intact    Musculoskeletal:      Right lower leg: No edema.      Left lower leg: No edema.   Neurological:      General: No focal deficit present.      Mental Status: She is alert and oriented to person, place, and time.   Psychiatric:         Mood and Affect: Mood normal.         Behavior: Behavior normal.         Thought Content: Thought content normal.         Judgment: Judgment normal.           Lab Results: I have reviewed the following results:  Lab Results   Component Value Date    WBC 16.91 (H) 05/02/2025    HGB 10.9 (L) 05/02/2025    HCT 34.5 (L) 05/02/2025    MCV 83 05/02/2025     05/02/2025     Jinny Pruitt  PGY-1 OB/GYN  05/04/25  6:08 AM

## 2025-05-04 NOTE — CASE MANAGEMENT
Case Management Progress Note    Patient name Araceli Levy  Location /-01 MRN [id number]  : 1990 Date 2025       LOS (days): 5  Geometric Mean LOS (GMLOS) (days):   Days to GMLOS:        OBJECTIVE:        Current admission status: Inpatient  Preferred Pharmacy:   [facility]/pharmacy #[id number] - [place], [place] - [address]  [address] [place] [address]  Phone: [phone] Fax: [phone]    Primary Care Provider: No primary care provider on file.    Primary Insurance: ALISIA DUVAL  Secondary Insurance:     PROGRESS NOTE:     CM delivered pump to MOB's room. Delivery ticket signed and placed in bin for DME liaison.

## 2025-05-04 NOTE — ASSESSMENT & PLAN NOTE
CBC/CMP wnl, P/C 0.3  Monitor BP's and signs/symptoms of worsening preeclampsia    Systolic (12hrs), Av , Min:121 , Max:121   Diastolic (12hrs), Av, Min:56, Max:56

## 2025-05-04 NOTE — PLAN OF CARE
Problem: PAIN - ADULT  Goal: Verbalizes/displays adequate comfort level or baseline comfort level  Description: Interventions:- Encourage patient to monitor pain and request assistance- Assess pain using appropriate pain scale- Administer analgesics based on type and severity of pain and evaluate response- Implement non-pharmacological measures as appropriate and evaluate response- Consider cultural and social influences on pain and pain management- Notify physician/advanced practitioner if interventions unsuccessful or patient reports new pain  Outcome: Adequate for Discharge     Problem: INFECTION - ADULT  Goal: Absence or prevention of progression during hospitalization  Description: INTERVENTIONS:- Assess and monitor for signs and symptoms of infection- Monitor lab/diagnostic results- Monitor all insertion sites, i.e. indwelling lines, tubes, and drains- Monitor endotracheal if appropriate and nasal secretions for changes in amount and color- [place] appropriate cooling/warming therapies per order- Administer medications as ordered- Instruct and encourage patient and family to use good hand hygiene technique- Identify and instruct in appropriate isolation precautions for identified infection/condition  Outcome: Adequate for Discharge  Goal: Absence of fever/infection during neutropenic period  Description: INTERVENTIONS:- Monitor WBC  Outcome: Adequate for Discharge     Problem: SAFETY ADULT  Goal: Patient will remain free of falls  Description: INTERVENTIONS:- Educate patient/family on patient safety including physical limitations- Instruct patient to call for assistance with activity - Consult OT/PT to assist with strengthening/mobility - Keep Call bell within reach- Keep bed low and locked with side rails adjusted as appropriate- Keep care items and personal belongings within reach- Initiate and maintain comfort rounds- Make Fall Risk Sign visible to staff  Outcome: Adequate for Discharge  Goal: Maintain  or return to baseline ADL function  Description: INTERVENTIONS:-  Assess patient's ability to carry out ADLs; assess patient's baseline for ADL function and identify physical deficits which impact ability to perform ADLs (bathing, care of mouth/teeth, toileting, grooming, dressing, etc.)- Assess/evaluate cause of self-care deficits - Assess range of motion- Assess patient's mobility; develop plan if impaired- Assess patient's need for assistive devices and provide as appropriate- Encourage maximum independence but intervene and supervise when necessary- Involve family in performance of ADLs- Assess for home care needs following discharge - Consider OT consult to assist with ADL evaluation and planning for discharge- Provide patient education as appropriate  Outcome: Adequate for Discharge  Goal: Maintains/Returns to pre admission functional level  Description: INTERVENTIONS:- Perform AM-PAC 6 Click Basic Mobility/ Daily Activity assessment daily.- Set and communicate daily mobility goal to care team and patient/family/caregiver. - Collaborate with rehabilitation services on mobility goals if consulted- Perform Range of Motion  Outcome: Adequate for Discharge     Problem: Knowledge Deficit  Goal: Patient/family/caregiver demonstrates understanding of disease process, treatment plan, medications, and discharge instructions  Description: Complete learning assessment and assess knowledge base.Interventions:- Provide teaching at level of understanding- Provide teaching via preferred learning methods  Outcome: Adequate for Discharge     Problem: DISCHARGE PLANNING  Goal: Discharge to home or other facility with appropriate resources  Description: INTERVENTIONS:- Identify barriers to discharge w/patient and caregiver- Arrange for needed discharge resources and transportation as appropriate- Identify discharge learning needs (meds, wound care, etc.)- Arrange for interpretive services to assist at discharge as needed- Refer  to Case Management Department for coordinating discharge planning if the patient needs post-hospital services based on physician/advanced practitioner order or complex needs related to functional status, cognitive ability, or social support system  Outcome: Adequate for Discharge     Problem: POSTPARTUM  Goal: Experiences normal postpartum course  Description: INTERVENTIONS:- Monitor maternal vital signs- Assess uterine involution and lochia  Outcome: Adequate for Discharge  Goal: Appropriate maternal -  bonding  Description: INTERVENTIONS:- Identify family support- Assess for appropriate maternal/infant bonding -Encourage maternal/infant bonding opportunities- Referral to  or  as needed  Outcome: Adequate for Discharge  Goal: Establishment of infant feeding pattern  Description: INTERVENTIONS:- Assess breast/bottle feeding- Refer to lactation as needed  Outcome: Adequate for Discharge

## 2025-05-05 PROCEDURE — 88307 TISSUE EXAM BY PATHOLOGIST: CPT | Performed by: PATHOLOGY

## 2025-05-05 NOTE — UTILIZATION REVIEW
"Mother and baby discharged 2025    NOTIFICATION OF INPATIENT ADMISSION   MATERNITY/DELIVERY AUTHORIZATION REQUEST   SERVICING FACILITY:   [facility] - L&D, [place], NICU  [address] [place] [address]  Tax ID: [id number]  NPI: [id number]   ATTENDING PROVIDER:  Attending Name and NPI#: Rabia Sandoval Md [[id number]]  Address: [address] [place] [address]  Phone: [phone]   ADMISSION INFORMATION:  Place of Service: Inpatient [facility]  Place of Service Code: 21  Inpatient Admission Date/Time: 25  3:39 PM  Discharge Date/Time: 2025  2:45 PM  Admitting Diagnosis Code/Description:  37 weeks gestation of pregnancy [Z3A.37]  Encounter for induction of labor [Z34.90]  Encounter for  delivery without indication [O82]     Mother: Araceli Levy 1990 Estimated Date of Delivery: 25  Delivering clinician: Rabia Sandoval   OB History          2    Para   1    Term   1       0    AB   1    Living   1         SAB   1    IAB   0    Ectopic   0    Multiple   0    Live Births   1               [place] Name & MRN:   Information for the patient's :  Lyudmila Carrillo John [[id number]]    Delivery Information:  Sex: female  Delivered 2025 10:35 AM by , Low Transverse; Gestational Age: 37w5d     Measurements:  Weight: 6 lb 15.1 oz (3150 g);  Height: 19.5\"    APGAR 1 minute 5 minutes 10 minutes   Totals: 8 9       UTILIZATION REVIEW CONTACT:  Magda Ellis Utilization   Network Utilization Review Department  Phone: [phone]  Fax [phone]  Email: [email or web address]  Contact for approvals/pending authorizations, clinical reviews, and discharge.     PHYSICIAN ADVISORY SERVICES:  Medical Necessity Denial & Dmaz-ki-Rqsq Review  Phone: [phone]  Fax: [phone]  Email: [email or web address]     DISCHARGE SUPPORT TEAM:  For " Patients Discharge Needs & Updates  Phone: [phone] opt. 2 Fax: [phone]  Email: [email or web address]    Initial Clinical Review    Admission: Date/Time/Statement:   Admission Orders (From admission, onward)       Ordered        04/29/25 1545  Inpatient Admission  Once                          Orders Placed This Encounter   Procedures    Inpatient Admission     Standing Status:   Standing     Number of Occurrences:   1     Level of Care:   Med Surg [16]     Estimated length of stay:   More than 2 Midnights     Certification:   I certify that inpatient services are medically necessary for this patient for a duration of greater than two midnights. See H&P and MD Progress Notes for additional information about the patient's course of treatment.         Chief Complaint   Patient presents with    Scheduled Induction       Initial Presentation: 35 y.o. female presented to L&D as inpatient admission for IOL for chronic decreased fetal movement. Plan continuous external monitoring, IVF,cytotec craig balloon, IV pitocin if needed Epidural and IV MGSO 4 if needed and supportive care   SVE: 1/70/-3     @ 1859  Craig balloon inserted   Cervical Dilation: 1  Cervical Effacement: 70  Fetal Station: -3  Fetal Heart Rate (FHT): 141 BPM  FHR Category: I     04-30-25  @0024  Craig expelled  IV Pitocin started  Contraction Frequency (minutes): 0  Contraction Intensity: Mild  Uterine Activity Characteristics: Occasional  Cervical Dilation: 3  Cervical Effacement: 80  Fetal Station: -2  Baseline Rate (FHR): 135 bpm  Fetal Heart Rate (FHT): 130 BPM  FHR Category: I    @0440  Dose (rose marie-units/min) Oxytocin: 4 rose marie-units/min  Contraction Frequency (minutes): diff to determine, side lying  Contraction Intensity: Mild/Moderate  Uterine Activity Characteristics: Irregular  Cervical Dilation: 3  Cervical Effacement: 80  Fetal Station: -2  Baseline Rate (FHR): 125 bpm  Fetal Heart Rate (FHT): 137 BPM  FHR Category: I    @  0906  S/p epidural     @ 1111  AROM clear fluid  Dose (rose marie-units/min) Oxytocin: 10 rose marie-units/min  Contraction Frequency (minutes): 5-7  Contraction Intensity: Mild/Moderate  Uterine Activity Characteristics: Irregular  Cervical Dilation: 3  Cervical Effacement: 80  Fetal Station: -2  Baseline Rate (FHR): 140 bpm  Fetal Heart Rate (FHT): 140 BPM  FHR Category: 1    @ 1712  Dose (rose marie-units/min) Oxytocin: 16 rose marie-units/min  Contraction Frequency (minutes): 1-4  Contraction Intensity: Moderate  Uterine Activity Characteristics: Coupling  Cervical Dilation: 3  Cervical Effacement: 80  Fetal Station: -1  Baseline Rate (FHR): 135 bpm  Fetal Heart Rate (FHT): 152 BPM  FHR Category: I    @ 2151  Dose (rose marie-units/min) Oxytocin: 20 rose marie-units/min  Contraction Frequency (minutes): 3-4  Contraction Intensity: Moderate  Uterine Activity Characteristics: Irregular  Cervical Dilation: 3  Cervical Effacement: 80  Fetal Station: -1  Baseline Rate (FHR): 135 bpm  Fetal Heart Rate (FHT): 141 BPM  FHR Category: I    05-01-25  @0118  Dose (rose marie-units/min) Oxytocin: 12 rose marie-units/min  Contraction Frequency (minutes): 2-4  Contraction Intensity: Moderate  Uterine Activity Characteristics: Irregular  Cervical Dilation: 3  Cervical Effacement: 80  Fetal Station: -1  Baseline Rate (FHR): 140 bpm  Fetal Heart Rate (FHT): 139 BPM  FHR Category: I    @ 0449  Dose (rose marie-units/min) Oxytocin: 18 rose marie-units/min  Contraction Frequency (minutes): 1.5-3.5  Contraction Intensity: Moderate  Uterine Activity Characteristics: Irregular  Cervical Dilation: 4  Cervical Effacement: 90  Fetal Station: -1  Baseline Rate (FHR): 145 bpm  Fetal Heart Rate (FHT): 156 BPM  FHR Category: I    @ 0757  ose (rose marie-units/min) Oxytocin: 20 rose marie-units/min  Contraction Frequency (minutes): 1.5-4  Contraction Intensity: Moderate  Uterine Activity Characteristics: Irregular  Cervical Dilation: 4  Cervical Effacement: 90  Fetal Station: -1  Baseline Rate (FHR):  145 bpm  Fetal Heart Rate (FHT): 148 BPM  FHR Category: 1    @ 0844  Dose (rose marie-units/min) Oxytocin: 0 rose marie-units/min (per Dr Pruitt)  Contraction Frequency (minutes): 2-4.5  Contraction Intensity: Moderate  Uterine Activity Characteristics: Irregular  Cervical Dilation: 4  Cervical Effacement: 90  Fetal Station: -1  Baseline Rate (FHR): 145 bpm  Fetal Heart Rate (FHT): 151 BPM  FHR Category: 1  Pitocin turned off in the setting of failed induction of labor     25   @ 37 w 5 d  Male @ 01:35  Apgar 8  9  Wt 3150 grams  Infant dried suctioned stimulated and warmed. Responded well and taken to NBN       Scheduled Medications:  No current facility-administered medications for this encounter.    Continuous IV Infusions:  No current facility-administered medications for this encounter.    PRN Meds:  No current facility-administered medications for this encounter.    ED Triage Vitals   Temperature Pulse Respirations Blood Pressure SpO2 Pain Score   25 1712 25 1712 25 1712 25 1712 25 0838 25 1708   98.2 °F (36.8 °C) 96 20 105/52 99 % 5     Weight (last 2 days) before discharge       Date/Time    25    Comment rows:    OBSERV: Discharge instructions discussed with patient and family. All questions answered. Verbalized understanding. at 25    Comment rows:    OBSERV: Dr Pruitt at bedside evaluating pt at 25            Vital Signs (last 3 days) before discharge       Date/Time Temp Pulse Resp BP MAP (mmHg) SpO2 O2 Device Cardiac (WDL) Patient Position - Orthostatic VS Pain    25 1216 -- -- -- -- -- -- -- -- -- 8    25 1021 -- -- -- -- -- -- -- -- -- 6    25 0827 98 °F (36.7 °C) 93 18 127/76 -- 97 % None (Room air) -- Sitting 7    25 0758 -- -- -- -- -- -- None (Room air) WDL -- 7    25 0452 -- -- -- -- -- -- -- -- -- 25 0042 -- -- -- -- -- -- -- -- -- 25 0026 97.5 °F (36.4 °C) 104  20 121/56 81 100 % None (Room air) -- Sitting 7    05/03/25 2007 -- -- -- -- -- -- -- WDL -- 8    OBSERV: Discharge instructions discussed with patient and family. All questions answered. Verbalized understanding. at 05/03/25 2007 05/03/25 1838 -- -- -- -- -- -- -- -- -- 5 05/03/25 1657 98.8 °F (37.1 °C) 101 20 119/79 -- 98 % None (Room air) -- Sitting 5 05/03/25 1357 -- -- -- -- -- -- -- -- -- 7 05/03/25 0905 97.7 °F (36.5 °C) 96 18 120/53 -- 96 % None (Room air) -- Sitting 7 05/03/25 0849 -- -- -- -- -- -- None (Room air) WDL -- 10 - Worst Possible Pain    05/03/25 0644 -- -- -- -- -- -- -- -- -- 8    05/03/25 0438 -- -- -- -- -- -- -- -- -- 6    05/03/25 0435 97.7 °F (36.5 °C) 89 18 115/63 -- 98 % -- -- -- --    05/03/25 0142 -- -- -- -- -- -- -- -- -- 8    05/03/25 0110 -- -- -- -- -- -- -- -- -- 8    05/02/25 2333 98.3 °F (36.8 °C) 83 18 125/69 -- 97 % -- -- -- 7    05/02/25 2040 -- -- -- -- -- -- -- -- -- --    OBSERV: Dr Pruitt at bedside evaluating pt at 05/02/25 2040 05/02/25 2015 97.8 °F (36.6 °C) 87 18 135/70 -- 97 % None (Room air) WD Sitting 10 - Worst Possible Pain    05/02/25 1829 -- -- -- -- -- -- -- -- -- 4    05/02/25 1713 -- -- -- -- -- -- -- -- -- 10 - Worst Possible Pain    05/02/25 1638 98.1 °F (36.7 °C) 92 18 132/75 -- 97 % None (Room air) -- Sitting 8    05/02/25 1359 -- -- -- -- -- -- -- -- -- 6    05/02/25 1235 -- -- -- -- -- -- -- -- -- 10 - Worst Possible Pain    05/02/25 1212 98.1 °F (36.7 °C) 96 18 111/70 84 -- -- -- Sitting --    05/02/25 0918 98 °F (36.7 °C) 100 20 116/70 -- -- -- -- -- 2    05/02/25 0815 -- -- -- -- -- -- -- WDL -- 8 05/02/25 0814 -- -- -- -- -- -- -- -- -- 8 05/02/25 0503 -- -- -- -- -- -- -- -- -- 7    05/02/25 0448 97.8 °F (36.6 °C) 96 18 120/60 -- 99 % None (Room air) -- Sitting 7    05/02/25 0052 -- -- -- -- -- -- -- -- -- 7 05/02/25 0019 97.6 °F (36.4 °C) 94 18 123/72 86 98 % None (Room air) -- Sitting No Pain    05/01/25 2243 -- -- --  -- -- -- -- -- -- 8 05/01/25 2016 97.5 °F (36.4 °C) 83 18 139/87 -- 97 % None (Room air) -- Lying 3    05/01/25 2001 -- -- -- -- -- -- None (Room air) WDL -- --    05/01/25 1729 -- -- -- -- -- -- -- -- -- 3    05/01/25 1701 97.6 °F (36.4 °C) 69 19 129/67 92 98 % None (Room air) -- Lying 2    05/01/25 1615 97.6 °F (36.4 °C) 81 18 144/89 -- 97 % None (Room air) -- Lying 8 05/01/25 1500 97.7 °F (36.5 °C) 74 18 120/86 -- 98 % None (Room air) -- Lying 6    05/01/25 1400 -- -- -- -- -- -- -- WDL -- --    05/01/25 1353 98.3 °F (36.8 °C) 82 18 131/79 -- 98 % None (Room air) -- Sitting 5 05/01/25 1315 98.1 °F (36.7 °C) 85 18 122/66 -- 97 % -- -- Sitting --    05/01/25 1300 -- 84 -- 114/67 -- 97 % -- -- -- --    05/01/25 1245 99.7 °F (37.6 °C) 91 18 113/58 -- 96 % -- -- Sitting --    05/01/25 1230 99.7 °F (37.6 °C) 97 18 126/59 -- 92 % -- -- Sitting 4 05/01/25 1215 99.8 °F (37.7 °C) 105 18 131/62 -- 93 % -- -- Sitting 4 05/01/25 1202 -- -- -- -- -- -- -- -- -- 8 05/01/25 1200 -- 106 -- 127/59 -- 94 % -- -- -- --    05/01/25 1152 99.7 °F (37.6 °C) 100 18 129/78 -- 94 % None (Room air) WDL Lying 6    05/01/25 0845 97.9 °F (36.6 °C) 100 18 129/62 -- -- -- -- Lying --    05/01/25 0830 -- -- -- 126/58 -- -- -- -- -- --    05/01/25 0800 -- 95 -- 114/58 -- -- -- -- -- --    05/01/25 0745 -- 101 -- 113/58 -- -- -- -- -- --    05/01/25 0730 -- -- -- 120/67 -- -- -- -- -- --    05/01/25 0700 -- 90 -- 128/58 -- -- -- -- -- --    05/01/25 0645 -- 98 -- 109/55 -- -- -- -- -- --    05/01/25 0630 -- 109 -- 125/61 -- -- -- -- -- --    05/01/25 0615 -- 93 -- 109/56 -- -- -- -- -- --    05/01/25 0600 -- 96 -- 123/57 -- -- -- -- -- --    05/01/25 0545 -- 98 -- 132/62 -- -- -- -- -- --    05/01/25 0541 -- 98 -- 132/62 -- -- -- -- -- --    05/01/25 0530 -- 91 -- 126/60 -- -- -- -- -- --    05/01/25 0515 -- 97 -- 119/59 -- -- -- -- -- --    05/01/25 0500 -- -- -- 113/66 -- -- -- -- -- --    05/01/25 0430 -- -- -- 109/61 -- -- --  -- -- --    05/01/25 0400 98.2 °F (36.8 °C) -- -- -- -- -- -- -- -- --    05/01/25 0345 -- 110 -- 121/53 -- -- -- -- -- --    05/01/25 0330 -- 105 -- 112/52 -- -- -- -- -- --    05/01/25 0315 -- 96 -- 109/53 -- -- -- -- -- --    05/01/25 0300 -- -- -- 122/57 -- -- -- -- -- --    05/01/25 0245 -- -- -- 112/55 -- -- -- -- -- --    05/01/25 0230 -- -- -- 125/59 -- -- -- -- -- --    05/01/25 0200 97.5 °F (36.4 °C) -- -- 132/66 -- -- -- -- -- --    05/01/25 0130 -- 102 -- 134/60 -- -- -- -- -- --    05/01/25 0100 -- 93 -- 135/75 -- -- -- -- -- --    05/01/25 0045 -- 94 -- 123/58 -- -- -- -- -- --    05/01/25 0030 -- 94 -- 128/60 -- -- -- -- -- --    05/01/25 0013 -- 97 -- 139/64 -- -- -- -- -- --    05/01/25 0000 98 °F (36.7 °C) 99 -- 128/65 -- -- -- -- -- --              Pertinent Labs/Diagnostic Test Results:   Radiology:  No orders to display     Cardiology:  No orders to display     GI:  No orders to display           Results from last 7 days   Lab Units 05/02/25  0459 04/29/25  1749   WBC Thousand/uL 16.91* 12.55*   HEMOGLOBIN g/dL 10.9* 12.4   HEMATOCRIT % 34.5* 37.8   PLATELETS Thousands/uL 237 263   TOTAL NEUT ABS Thousands/µL  --  9.19*         Results from last 7 days   Lab Units 05/01/25  0943   SODIUM mmol/L 135   POTASSIUM mmol/L 3.5   CHLORIDE mmol/L 106   CO2 mmol/L 20*   ANION GAP mmol/L 9   BUN mg/dL 9   CREATININE mg/dL 0.66   EGFR ml/min/1.73sq m 115   CALCIUM mg/dL 8.6     Results from last 7 days   Lab Units 05/01/25  0943   AST U/L 20   ALT U/L 14   ALK PHOS U/L 87   TOTAL PROTEIN g/dL 6.6   ALBUMIN g/dL 3.3*   TOTAL BILIRUBIN mg/dL 0.52     Results from last 7 days   Lab Units 05/01/25  0938 05/01/25  0840 05/01/25  0715 05/01/25  0619 05/01/25  0515 05/01/25  0412 05/01/25  0307 05/01/25  0213 05/01/25  0116 05/01/25  0017 04/30/25  2204 04/30/25 2012   POC GLUCOSE mg/dl 100 89 87 89 95 82 89 86 85 80 73 82     Results from last 7 days   Lab Units 05/01/25  0943   GLUCOSE RANDOM mg/dL 94           "   No results found for: \"BETA-HYDROXYBUTYRATE\"                                                                                       Results from last 7 days   Lab Units 25  0810   CREATININE UR mg/dL 53.6   PROTEIN UR mg/dL 17.6   PROT/CREAT RATIO UR  0.3*                                                   Past Medical History:   Diagnosis Date    Migraine      Present on Admission:   Preeclampsia   Diet controlled gestational diabetes mellitus (GDM) in third trimester   Decreased fetal movement during pregnancy in third trimester, antepartum   Macrosomia affecting management of mother in third trimester      Admitting Diagnosis: 37 weeks gestation of pregnancy [Z3A.37]  Encounter for induction of labor [Z34.90]  Encounter for  delivery without indication [O82]  Age/Sex: 35 y.o. female    Network Utilization Review Department  ATTENTION: Please call with any questions or concerns to [phone] and carefully listen to the prompts so that you are directed to the right person. All voicemails are confidential.   For Discharge needs, contact Care Management DC Support Team at [phone] opt. 2  Send all requests for admission clinical reviews, approved or denied determinations and any other requests to dedicated fax number below belonging to the [place] where the patient is receiving treatment. List of dedicated fax numbers for the Facilities:  FACILITY NAME UR FAX NUMBER   ADMISSION DENIALS (Administrative/Medical Necessity) [phone]   DISCHARGE SUPPORT TEAM (NETWORK) [phone]   PARENT CHILD HEALTH (Maternity/NICU/Pediatrics) [phone]   [facility] [phone]   [facility] [phone]   [facility] [phone]   [facility] [phone]   [facility] [phone]   [facility] [phone]   [facility] " [phone]   [facility] [phone]   [facility] [phone]   [facility] [phone]   [facility] [phone]   [facility] [phone]        NOTIFICATION OF ADMISSION DISCHARGE   This is a Notification of Discharge from [facility]. Please be advised that this patient has been discharge from our facility. Below you will find the admission and discharge date and time including the patient’s disposition.   UTILIZATION REVIEW CONTACT:  Utilization Review Assistants  Network Utilization Review Department  Phone: [phone] x carefully listen to the prompts. All voicemails are confidential.  Email: [email or web address]     ADMISSION INFORMATION  PRESENTATION DATE: 4/29/2025  3:39 PM  OBERVATION ADMISSION DATE: N/A  INPATIENT ADMISSION DATE: 4/29/25  3:39 PM   DISCHARGE DATE: 5/4/2025  2:45 PM   DISPOSITION:Home/Self Care    Network Utilization Review Department  ATTENTION: Please call with any questions or concerns to [phone] and carefully listen to the prompts so that you are directed to the right person. All voicemails are confidential.   For Discharge needs, contact Care Management DC Support Team at [phone] opt. 2  Send all requests for admission clinical reviews, approved or denied determinations and any other requests to dedicated fax number below belonging to the campus where the patient is receiving treatment. List of dedicated fax numbers for the Facilities:  FACILITY NAME UR FAX NUMBER   ADMISSION DENIALS (Administrative/Medical Necessity) [phone]   DISCHARGE SUPPORT TEAM ([facility]) [phone]   PARENT CHILD HEALTH (Maternity/NICU/Pediatrics) [phone]   [facility] [phone]   [facility] [phone]   [facility] [phone]   [facility] [phone]   [facility] [phone]   [facility] [phone]   [facility] [phone]   [facility] [phone]   [facility] [phone]   [facility] [phone]   [facility] [phone]   [facility] [phone]

## 2025-05-05 NOTE — ANESTHESIA POSTPROCEDURE EVALUATION
Post-Op Assessment Note    CV Status:  Stable  Pain Score: 0    Pain management: adequate    Multimodal analgesia used between 6 hours prior to anesthesia start to PACU discharge   Post-op block assessment: catheter intact   Mental Status:  Alert and awake   Hydration Status:  Euvolemic   PONV Controlled:  Controlled   Airway Patency:  Patent     Post Op Vitals Reviewed: Yes    No anethesia notable event occurred.            Last Filed PACU Vitals:  Vitals Value Taken Time   Temp 98.1 °F (36.7 °C) 05/01/25 1315   Pulse 85 05/01/25 1322   /66 05/01/25 1315   Resp 18 05/01/25 1315   SpO2 94 % 05/01/25 1322   Vitals shown include unfiled device data.    Modified Zenaida:     Vitals Value Taken Time   Activity 2 05/01/25 1152   Respiration 2 05/01/25 1152   Circulation 2 05/01/25 1152   Consciousness 2 05/01/25 1152   Oxygen Saturation 2 05/01/25 1152     Modified Zenaida Score: 10

## 2025-05-20 ENCOUNTER — TELEPHONE (OUTPATIENT)
Age: 35
End: 2025-05-20

## 2025-05-20 NOTE — TELEPHONE ENCOUNTER
Patient was called for PP follow up.  Left a voicemail.  Patient was reminded that she can message this nurse via My Chart and If  having any symptoms or concerns to Please call [phone].    DIANA Osorio  OB Nurse Navigator

## 2025-05-27 ENCOUNTER — POSTPARTUM VISIT (OUTPATIENT)
Dept: OBGYN CLINIC | Facility: MEDICAL CENTER | Age: 35
End: 2025-05-27

## 2025-05-27 ENCOUNTER — TELEPHONE (OUTPATIENT)
Age: 35
End: 2025-05-27

## 2025-05-27 VITALS — BODY MASS INDEX: 43.17 KG/M2 | SYSTOLIC BLOOD PRESSURE: 124 MMHG | DIASTOLIC BLOOD PRESSURE: 86 MMHG | WEIGHT: 259.4 LBS

## 2025-05-27 DIAGNOSIS — Z98.891 STATUS POST PRIMARY LOW TRANSVERSE CESAREAN SECTION: ICD-10-CM

## 2025-05-27 PROCEDURE — 99024 POSTOP FOLLOW-UP VISIT: CPT | Performed by: PHYSICIAN ASSISTANT

## 2025-05-27 NOTE — TELEPHONE ENCOUNTER
Patient was called for PP follow up.  Left a voicemail.  Patient was reminded that she can message this nurse via My Chart and If  having any symptoms or concerns to Please call [phone].    DIANA Osorio  OB Nurse Navigator

## 2025-05-27 NOTE — PROGRESS NOTES
Name: Araceli Levy      : 1990      MRN: [id number]  Encounter Provider: Leela Clark PA-C  Encounter Date: 2025   Encounter department: [facility]  :  Assessment & Plan  Postpartum care following  delivery  Contraception: undecided. Options reviewed. Plan for f/u in 3 weeks to establish. Aware to call ahead if desires nexplanon insertion at that time.     Discussed return of menses, limitations of tracking menses/ovulation in the first year, and recommended interval between pregnancies.     Reviewed guidelines on resuming physical and sexual activity.   Resources at [facility] reviewed.    Advised to call with any issues, all concerns & questions addressed.     She will return in 3 weeks for follow up visit, sooner PRN.     Orders:    Ambulatory Referral to Physical Therapy; Future    Status post primary low transverse  section  Some delayed healing on right side of CS. No signs of infection. Reviewed wound care and expectations. Aware of sx to report.  Encouraged reduced physical exercise/exertion to allow for healing. Suspect right sided pain is muscular given intermittent occurrence and reproducibility. No acute abdomen. Precautions given.            History of Present Illness   35 y.o. female here for her postpartum visit.      She is 3 weeks S/p 1LTCS on 25 @ 37w5d following failed induction. Pregnancy complicated by chronic decreased fetal movement, suspected fetal macrosomia, and A1GDM.     Repots some light brown drainage from the right side of her CS scar. Irritation after showering and bandage pain. No constant pain, expanding redness, warmth, or pus drainage.   Occasionally with sharp, pulling sensation of right lower abdomen that is brought on by movements -- lifting, vacuuming, sweeping.      Gender Female  APGARS 8 and 9  Weight 6 lbs 15.1 oz  Her bleeding is light, decreasing -- changes pad once daily.     She is Breastfeeding without problems.   Bowel function is normal. Bladder function is normal  She denies postpartum blues/depression.  EPDS 0    Last Pap: 11/11/2024 NILM/hpv neg .    Contraceptive options reviewed. Undecided                Review of Systems   Constitutional:  Positive for fatigue. Negative for chills, diaphoresis and fever.   Respiratory:  Negative for cough, chest tightness and shortness of breath.    Cardiovascular:  Negative for chest pain, palpitations and leg swelling.   Gastrointestinal:  Negative for constipation.   Genitourinary:  Positive for vaginal bleeding. Negative for difficulty urinating, pelvic pain, vaginal discharge and vaginal pain.   Skin:  Positive for wound. Negative for color change.   Neurological:  Positive for headaches (some HAs last week, now resolved).   Psychiatric/Behavioral:  Negative for dysphoric mood and sleep disturbance. The patient is not nervous/anxious.      Medical History Reviewed by provider this encounter:     .  Medications Ordered Prior to Encounter[1]   Social History[2]     Objective   /86 (BP Location: Left arm, Patient Position: Sitting, Cuff Size: Large)   Wt 118 kg (259 lb 6.4 oz)   LMP 08/01/2024 (Exact Date)   Breastfeeding Yes   BMI 43.17 kg/m²      Physical Exam  Vitals and nursing note reviewed.   Constitutional:       General: She is not in acute distress.     Appearance: Normal appearance. She is not ill-appearing.   HENT:      Head: Normocephalic and atraumatic.     Eyes:      Conjunctiva/sclera: Conjunctivae normal.     Pulmonary:      Effort: Pulmonary effort is normal.   Abdominal:      General: There is no distension.      Palpations: Abdomen is soft.      Tenderness: There is no abdominal tenderness.     Skin:     General: Skin is warm and dry.     Neurological:      General: No focal deficit present.      Mental Status: She is alert.     Psychiatric:         Mood and Affect: Mood normal.         Behavior: Behavior  normal.              [1]   Current Outpatient Medications on File Prior to Visit   Medication Sig Dispense Refill    hydrocortisone 1 % cream Apply 1 Application topically 4 (four) times a day as needed for irritation      ibuprofen (MOTRIN) 600 mg tablet Take 1 tablet (600 mg total) by mouth every 6 (six) hours 30 tablet 2    Prenatal Vit-Fe Fumarate-FA (PRENATAL PO) Take by mouth      Blood Glucose Monitoring Suppl (Contour Next EZ) w/Device KIT Dispense 1 kit per insurance formulary. GDM. 1 kit 0    docusate sodium (COLACE) 100 mg capsule Take 1 capsule (100 mg total) by mouth 2 (two) times a day      lidocaine (LIDODERM) 5 % Apply 1 patch topically over 12 hours daily Remove & Discard patch within 12 hours or as directed by MD 5 patch 0    Microlet Lancets MISC Use 4 a day.  each 5     No current facility-administered medications on file prior to visit.   [2]   Social History  Tobacco Use    Smoking status: Never    Smokeless tobacco: Never   Vaping Use    Vaping status: Never Used   Substance and Sexual Activity    Alcohol use: Never    Drug use: Never    Sexual activity: Yes     Partners: Male     Birth control/protection: None

## 2025-05-29 NOTE — ASSESSMENT & PLAN NOTE
Some delayed healing on right side of CS. No signs of infection. Reviewed wound care and expectations. Aware of sx to report.  Encouraged reduced physical exercise/exertion to allow for healing. Suspect right sided pain is muscular given intermittent occurrence and reproducibility. No acute abdomen. Precautions given.

## 2025-06-16 ENCOUNTER — APPOINTMENT (EMERGENCY)
Dept: CT IMAGING | Facility: HOSPITAL | Age: 35
End: 2025-06-16
Payer: COMMERCIAL

## 2025-06-16 ENCOUNTER — HOSPITAL ENCOUNTER (EMERGENCY)
Facility: HOSPITAL | Age: 35
Discharge: HOME | End: 2025-06-16
Attending: EMERGENCY MEDICINE | Admitting: EMERGENCY MEDICINE
Payer: COMMERCIAL

## 2025-06-16 VITALS
TEMPERATURE: 98.1 F | OXYGEN SATURATION: 99 % | HEART RATE: 82 BPM | RESPIRATION RATE: 18 BRPM | SYSTOLIC BLOOD PRESSURE: 128 MMHG | WEIGHT: 263.67 LBS | DIASTOLIC BLOOD PRESSURE: 75 MMHG | BODY MASS INDEX: 43.88 KG/M2

## 2025-06-16 DIAGNOSIS — O92.4 DECREASED LACTATION: ICD-10-CM

## 2025-06-16 DIAGNOSIS — R52 PAIN AGGRAVATED BY EATING OR DRINKING: ICD-10-CM

## 2025-06-16 DIAGNOSIS — R07.9 CHEST PAIN: Primary | ICD-10-CM

## 2025-06-16 DIAGNOSIS — M54.9 UPPER BACK PAIN: ICD-10-CM

## 2025-06-16 LAB
ALBUMIN SERPL BCG-MCNC: 3.8 G/DL (ref 3.5–5)
ALP SERPL-CCNC: 73 U/L (ref 34–104)
ALT SERPL W P-5'-P-CCNC: 13 U/L (ref 7–52)
ANION GAP SERPL CALCULATED.3IONS-SCNC: 8 MMOL/L (ref 4–13)
AST SERPL W P-5'-P-CCNC: 16 U/L (ref 13–39)
BASOPHILS # BLD AUTO: 0.06 THOUSANDS/ÂΜL (ref 0–0.1)
BASOPHILS NFR BLD AUTO: 1 % (ref 0–1)
BILIRUB SERPL-MCNC: 0.17 MG/DL (ref 0.2–1)
BUN SERPL-MCNC: 17 MG/DL (ref 5–25)
CALCIUM SERPL-MCNC: 8.4 MG/DL (ref 8.4–10.2)
CARDIAC TROPONIN I PNL SERPL HS: <2 NG/L (ref ?–50)
CHLORIDE SERPL-SCNC: 103 MMOL/L (ref 96–108)
CO2 SERPL-SCNC: 27 MMOL/L (ref 21–32)
CREAT SERPL-MCNC: 0.61 MG/DL (ref 0.6–1.3)
EOSINOPHIL # BLD AUTO: 0.8 THOUSAND/ÂΜL (ref 0–0.61)
EOSINOPHIL NFR BLD AUTO: 8 % (ref 0–6)
ERYTHROCYTE [DISTWIDTH] IN BLOOD BY AUTOMATED COUNT: 13.7 % (ref 11.6–15.1)
GFR SERPL CREATININE-BSD FRML MDRD: 118 ML/MIN/1.73SQ M
GLUCOSE SERPL-MCNC: 84 MG/DL (ref 65–140)
HCG SERPL QL: NEGATIVE
HCT VFR BLD AUTO: 38 % (ref 34.8–46.1)
HGB BLD-MCNC: 12 G/DL (ref 11.5–15.4)
IMM GRANULOCYTES # BLD AUTO: 0.02 THOUSAND/UL (ref 0–0.2)
IMM GRANULOCYTES NFR BLD AUTO: 0 % (ref 0–2)
LYMPHOCYTES # BLD AUTO: 3.24 THOUSANDS/ÂΜL (ref 0.6–4.47)
LYMPHOCYTES NFR BLD AUTO: 34 % (ref 14–44)
MCH RBC QN AUTO: 25.2 PG (ref 26.8–34.3)
MCHC RBC AUTO-ENTMCNC: 31.6 G/DL (ref 31.4–37.4)
MCV RBC AUTO: 80 FL (ref 82–98)
MONOCYTES # BLD AUTO: 0.73 THOUSAND/ÂΜL (ref 0.17–1.22)
MONOCYTES NFR BLD AUTO: 8 % (ref 4–12)
NEUTROPHILS # BLD AUTO: 4.78 THOUSANDS/ÂΜL (ref 1.85–7.62)
NEUTS SEG NFR BLD AUTO: 49 % (ref 43–75)
NRBC BLD AUTO-RTO: 0 /100 WBCS
PLATELET # BLD AUTO: 366 THOUSANDS/UL (ref 149–390)
PMV BLD AUTO: 8.5 FL (ref 8.9–12.7)
POTASSIUM SERPL-SCNC: 3.7 MMOL/L (ref 3.5–5.3)
PROT SERPL-MCNC: 7.6 G/DL (ref 6.4–8.4)
RBC # BLD AUTO: 4.76 MILLION/UL (ref 3.81–5.12)
SODIUM SERPL-SCNC: 138 MMOL/L (ref 135–147)
WBC # BLD AUTO: 9.63 THOUSAND/UL (ref 4.31–10.16)

## 2025-06-16 PROCEDURE — 99285 EMERGENCY DEPT VISIT HI MDM: CPT

## 2025-06-16 PROCEDURE — 96374 THER/PROPH/DIAG INJ IV PUSH: CPT

## 2025-06-16 PROCEDURE — 85025 COMPLETE CBC W/AUTO DIFF WBC: CPT

## 2025-06-16 PROCEDURE — 71250 CT THORAX DX C-: CPT

## 2025-06-16 PROCEDURE — 84484 ASSAY OF TROPONIN QUANT: CPT

## 2025-06-16 PROCEDURE — 36415 COLL VENOUS BLD VENIPUNCTURE: CPT

## 2025-06-16 PROCEDURE — 80053 COMPREHEN METABOLIC PANEL: CPT

## 2025-06-16 PROCEDURE — 84703 CHORIONIC GONADOTROPIN ASSAY: CPT

## 2025-06-16 RX ORDER — KETOROLAC TROMETHAMINE 30 MG/ML
15 INJECTION, SOLUTION INTRAMUSCULAR; INTRAVENOUS ONCE
Status: COMPLETED | OUTPATIENT
Start: 2025-06-16 | End: 2025-06-16

## 2025-06-16 RX ORDER — DIAZEPAM 2 MG/1
2 TABLET ORAL ONCE
Status: COMPLETED | OUTPATIENT
Start: 2025-06-16 | End: 2025-06-16

## 2025-06-16 RX ORDER — LIDOCAINE 50 MG/G
1 PATCH TOPICAL ONCE
Status: DISCONTINUED | OUTPATIENT
Start: 2025-06-16 | End: 2025-06-16 | Stop reason: HOSPADM

## 2025-06-16 RX ADMIN — KETOROLAC TROMETHAMINE 15 MG: 30 INJECTION, SOLUTION INTRAMUSCULAR at 03:53

## 2025-06-16 RX ADMIN — LIDOCAINE 5% 1 PATCH: 700 PATCH TOPICAL at 03:53

## 2025-06-16 RX ADMIN — IOHEXOL 85 ML: 350 INJECTION, SOLUTION INTRAVENOUS at 03:33

## 2025-06-16 RX ADMIN — DIAZEPAM 2 MG: 2 TABLET ORAL at 03:53

## 2025-06-16 RX ADMIN — IOHEXOL 50 ML: 240 INJECTION, SOLUTION INTRATHECAL; INTRAVASCULAR; INTRAVENOUS; ORAL at 03:33

## 2025-06-16 NOTE — DISCHARGE INSTRUCTIONS
Follow-up with PCP, OB/GYN, GI.  Rest and hydrate.  Clear liquids advance and bland diet as discussed.  Use boost and Ensure as needed.  If any symptoms worsen or new symptoms appear return to the ER.

## 2025-06-16 NOTE — ED PROVIDER NOTES
Time reflects when diagnosis was documented in both MDM as applicable and the Disposition within this note       Time User Action Codes Description Comment    6/16/2025  4:51 AM James Ramirez [R07.9] Chest pain     6/16/2025  4:51 AM James Ramirez Add [M54.9] Upper back pain     6/16/2025  4:51 AM James Ramirez Add [O92.4] Decreased lactation     6/16/2025  4:53 AM James Ramirez [R52] Pain aggravated by eating or drinking           ED Disposition       ED Disposition   Discharge    Condition   Stable    Date/Time   Mon Jun 16, 2025  4:52 AM    Comment   Araceli Carolinaoyo discharge to home/self care.                   Assessment & Plan       Medical Decision Making  Exam without evidence of volume overload so doubt heart failure. EKG without signs of active ischemia. Given the timing of pain to ER presentation, initial troponin <2 with symptoms greater than 3 hrs so doubt NSTEMI. Presentation not consistent with acute PE (Wells low risk PERC negative),pneumothorax (not visualized on chest xr), thoracic aortic dissection, pericarditis, tamponade, pneumonia (no infectious symptoms, clear chest xr), myocarditis (no recent illness, neg trop).  CT chest/esophagram negative for esophageal perforation or abnormality in the chest wall cavity.  Patient improved with medicine.  She will follow-up with GI.  Discussed clear liquids advancing to bland diet and strict return parameters.  Patient understands treatment plan and follow up.   HEART score: 2 so plan to discharge patient home with PCP follow up. Prior to discharge, discharge instructions were discussed with patient at bedside. Patient was provided both verbal and written instructions. Patient is understanding of the discharge instructions and is agreeable to plan of care. Return precautions were discussed with patient bedside, patient verbalized understanding of signs and symptoms that would necessitate return to the ED. All questions were answered. Patient  was comfortable with the plan of care and discharged to home.       Problems Addressed:  Chest pain: acute illness or injury  Decreased lactation: acute illness or injury  Pain aggravated by eating or drinking: acute illness or injury  Upper back pain: acute illness or injury    Amount and/or Complexity of Data Reviewed  Labs: ordered. Decision-making details documented in ED Course.  Radiology: ordered. Decision-making details documented in ED Course.    Risk  Prescription drug management.        ED Course as of 06/16/25 0758 Mon Jun 16, 2025   0255 PREGNANCY, SERUM: Negative   0430 CT ESOPHAGRAM  No acute findings in the chest. Specifically, no CT evidence of esophageal perforation.       Medications   iohexol (OMNIPAQUE) 350 MG/ML injection (MULTI-DOSE) 85 mL (85 mL Intravenous Given 6/16/25 0333)   iohexol (OMNIPAQUE) 240 MG/ML solution 50 mL (50 mL Oral Given 6/16/25 0333)   diazepam (VALIUM) tablet 2 mg (2 mg Oral Given 6/16/25 0353)   ketorolac (TORADOL) injection 15 mg (15 mg Intravenous Given 6/16/25 0353)       ED Risk Strat Scores   HEART Risk Score      Flowsheet Row Most Recent Value   Heart Score Risk Calculator    History 0 Filed at: 06/16/2025 0757   ECG 1 Filed at: 06/16/2025 0757   Age 0 Filed at: 06/16/2025 0757   Risk Factors 1 Filed at: 06/16/2025 0757   Troponin 0 Filed at: 06/16/2025 0757   HEART Score 2 Filed at: 06/16/2025 0757          HEART Risk Score      Flowsheet Row Most Recent Value   Heart Score Risk Calculator    History 0 Filed at: 06/16/2025 0757   ECG 1 Filed at: 06/16/2025 0757   Age 0 Filed at: 06/16/2025 0757   Risk Factors 1 Filed at: 06/16/2025 0757   Troponin 0 Filed at: 06/16/2025 0757   HEART Score 2 Filed at: 06/16/2025 0757                      No data recorded        SBIRT 20yo+      Flowsheet Row Most Recent Value   Initial Alcohol Screen: US AUDIT-C     1. How often do you have a drink containing alcohol? 0 Filed at: 06/16/2025 0128   2. How many drinks containing  "alcohol do you have on a typical day you are drinking?  0 Filed at: 2025   3b. FEMALE Any Age, or MALE 65+: How often do you have 4 or more drinks on one occassion? 0 Filed at: 2025   Audit-C Score 0 Filed at: 2025   MAR: How many times in the past year have you...    Used an illegal drug or used a prescription medication for non-medical reasons? Never Filed at: 2025                            History of Present Illness       Chief Complaint   Patient presents with    Medical Problem     Pt c/o neck pain/spasms, sore throat, reduced production of breast milk. Pt had emergency  6 weeks ago and states they were throwing up a lot during the  and has had this sore throat and \"weird feeling\" in throat ever since.       Past Medical History[1]   Past Surgical History[2]   Family History[3]   Social History[4]   E-Cigarette/Vaping    E-Cigarette Use Never User       E-Cigarette/Vaping Substances    Nicotine No     THC No     CBD No     Flavoring No       I have reviewed and agree with the history as documented.     The patient is a 35 y.o. female with a history of migraines who presents to [facility] with a chief complaint of chest pain. Symptoms began a week ago and have been  worsening since onset. Her pain is currently rated as a 6/10 in severity and described as sharp without radiation. Associated symptoms include upper back pain and pain with eating. Symptoms are aggravated with eating and alleviating factors include none noted. The patient denies fever, chills, night sweats, blurred vision, double vision, syncope, numbness, weakness, tingling, difficulty walking, hemoptysis, hematemesis, hematochezia, melena, falls, trauma. No other reported symptoms at this time.  Patient affirms allergies to cherry, shellfish and sulfa            History provided by:  Patient   used: No    Medical Problem  Associated symptoms: chest " pain    Associated symptoms: no abdominal pain, no cough, no ear pain, no fever, no headaches, no rash, no shortness of breath, no sore throat and no vomiting        Review of Systems   Constitutional:  Negative for chills and fever.   HENT:  Negative for ear pain and sore throat.    Eyes:  Negative for pain and visual disturbance.   Respiratory:  Negative for cough and shortness of breath.    Cardiovascular:  Positive for chest pain. Negative for palpitations.   Gastrointestinal:  Negative for abdominal pain and vomiting.   Genitourinary:  Negative for dysuria and hematuria.   Musculoskeletal:  Positive for back pain. Negative for arthralgias.   Skin:  Negative for color change and rash.   Neurological:  Negative for dizziness, seizures, syncope, facial asymmetry, light-headedness, numbness and headaches.   All other systems reviewed and are negative.          Objective       ED Triage Vitals   Temperature Pulse Blood Pressure Respirations SpO2 Patient Position - Orthostatic VS   06/16/25 0126 06/16/25 0126 06/16/25 0126 06/16/25 0126 06/16/25 0126 --   98.1 °F (36.7 °C) 87 136/70 18 98 %       Temp Source Heart Rate Source BP Location FiO2 (%) Pain Score    06/16/25 0126 06/16/25 0126 06/16/25 0456 -- 06/16/25 0456    Oral Monitor Right arm  2      Vitals      Date and Time Temp Pulse SpO2 Resp BP Pain Score FACES Pain Rating User   06/16/25 0456 -- 82 99 % 18 128/75 2 -- BS   06/16/25 0126 98.1 °F (36.7 °C) 87 98 % 18 136/70 -- -- KG            Physical Exam  Vitals reviewed.   Constitutional:       General: She is not in acute distress.     Appearance: Normal appearance. She is obese. She is not ill-appearing or toxic-appearing.   HENT:      Head: Normocephalic.      Right Ear: Tympanic membrane, ear canal and external ear normal.      Left Ear: Tympanic membrane, ear canal and external ear normal.      Nose: Nose normal.      Mouth/Throat:      Mouth: Mucous membranes are moist.      Pharynx: Oropharynx is  clear.     Eyes:      General: No scleral icterus.     Extraocular Movements: Extraocular movements intact.      Conjunctiva/sclera: Conjunctivae normal.      Pupils: Pupils are equal, round, and reactive to light.     Neck:      Vascular: No carotid bruit.     Cardiovascular:      Rate and Rhythm: Normal rate.      Pulses: Normal pulses.   Pulmonary:      Effort: Pulmonary effort is normal. No respiratory distress.      Breath sounds: Normal breath sounds. No stridor. No wheezing, rhonchi or rales.   Abdominal:      General: Abdomen is flat.      Palpations: Abdomen is soft.      Tenderness: There is no abdominal tenderness. There is no guarding.     Musculoskeletal:         General: Normal range of motion.      Cervical back: Normal range of motion and neck supple. No rigidity or tenderness.      Right lower leg: No edema.      Left lower leg: No edema.   Lymphadenopathy:      Cervical: No cervical adenopathy.     Skin:     General: Skin is warm and dry.      Capillary Refill: Capillary refill takes less than 2 seconds.      Coloration: Skin is not jaundiced or pale.      Findings: No bruising, erythema or lesion.     Neurological:      Mental Status: She is alert and oriented to person, place, and time. Mental status is at baseline.         Results Reviewed       Procedure Component Value Units Date/Time    hCG, qualitative pregnancy [[id number]]  (Normal) Collected: 06/16/25 0221    Lab Status: Final result Specimen: Blood from Arm, Right Updated: 06/16/25 0255     Preg, Serum Negative    HS Troponin 0hr (reflex protocol) [[id number]]  (Normal) Collected: 06/16/25 0221    Lab Status: Final result Specimen: Blood from Arm, Right Updated: 06/16/25 0255     hs TnI 0hr <2 ng/L     Comprehensive metabolic panel [[id number]]  (Abnormal) Collected: 06/16/25 0221    Lab Status: Final result Specimen: Blood from Arm, Right Updated: 06/16/25 0251     Sodium 138 mmol/L      Potassium 3.7 mmol/L      Chloride 103 mmol/L       CO2 27 mmol/L      ANION GAP 8 mmol/L      BUN 17 mg/dL      Creatinine 0.61 mg/dL      Glucose 84 mg/dL      Calcium 8.4 mg/dL      AST 16 U/L      ALT 13 U/L      Alkaline Phosphatase 73 U/L      Total Protein 7.6 g/dL      Albumin 3.8 g/dL      Total Bilirubin 0.17 mg/dL      eGFR 118 ml/min/1.73sq m     Narrative:      National Kidney Disease Foundation guidelines for Chronic Kidney Disease (CKD):     Stage 1 with normal or high GFR (GFR > 90 mL/min/1.73 square meters)    Stage 2 Mild CKD (GFR = 60-89 mL/min/1.73 square meters)    Stage 3A Moderate CKD (GFR = 45-59 mL/min/1.73 square meters)    Stage 3B Moderate CKD (GFR = 30-44 mL/min/1.73 square meters)    Stage 4 Severe CKD (GFR = 15-29 mL/min/1.73 square meters)    Stage 5 End Stage CKD (GFR <15 mL/min/1.73 square meters)  Note: GFR calculation is accurate only with a steady state creatinine    CBC and differential [[id number]]  (Abnormal) Collected: 06/16/25 0221    Lab Status: Final result Specimen: Blood from Arm, Right Updated: 06/16/25 0229     WBC 9.63 Thousand/uL      RBC 4.76 Million/uL      Hemoglobin 12.0 g/dL      Hematocrit 38.0 %      MCV 80 fL      MCH 25.2 pg      MCHC 31.6 g/dL      RDW 13.7 %      MPV 8.5 fL      Platelets 366 Thousands/uL      nRBC 0 /100 WBCs      Segmented % 49 %      Immature Grans % 0 %      Lymphocytes % 34 %      Monocytes % 8 %      Eosinophils Relative 8 %      Basophils Relative 1 %      Absolute Neutrophils 4.78 Thousands/µL      Absolute Immature Grans 0.02 Thousand/uL      Absolute Lymphocytes 3.24 Thousands/µL      Absolute Monocytes 0.73 Thousand/µL      Eosinophils Absolute 0.80 Thousand/µL      Basophils Absolute 0.06 Thousands/µL             CT ESOPHAGRAM   Final Interpretation by Elei Morales MD (06/16 0429)      No acute findings in the chest. Specifically, no CT evidence of esophageal perforation.            Computerized Assisted Algorithm (CAA) may have aided analysis of applicable images.          Workstation performed: [id number]             Procedures    ED Medication and Procedure Management   Prior to Admission Medications   Prescriptions Last Dose Informant Patient Reported? Taking?   Blood Glucose Monitoring Suppl (Contour Next EZ) w/Device KIT  Self No No   Sig: Dispense 1 kit per insurance formulary. GDM.   Microlet Lancets MISC  Self No No   Sig: Use 4 a day. GDM   Prenatal Vit-Fe Fumarate-FA (PRENATAL PO)  Self Yes No   Sig: Take by mouth   docusate sodium (COLACE) 100 mg capsule   No No   Sig: Take 1 capsule (100 mg total) by mouth 2 (two) times a day   hydrocortisone 1 % cream   No No   Sig: Apply 1 Application topically 4 (four) times a day as needed for irritation   ibuprofen (MOTRIN) 600 mg tablet   No No   Sig: Take 1 tablet (600 mg total) by mouth every 6 (six) hours   lidocaine (LIDODERM) 5 %   No No   Sig: Apply 1 patch topically over 12 hours daily Remove & Discard patch within 12 hours or as directed by MD      Facility-Administered Medications: None     Discharge Medication List as of 6/16/2025  4:53 AM        CONTINUE these medications which have NOT CHANGED    Details   Blood Glucose Monitoring Suppl (Contour Next EZ) w/Device KIT Dispense 1 kit per insurance formulary. GDM., Normal      docusate sodium (COLACE) 100 mg capsule Take 1 capsule (100 mg total) by mouth 2 (two) times a day, Starting Sun 5/4/2025, No Print      hydrocortisone 1 % cream Apply 1 Application topically 4 (four) times a day as needed for irritation, Starting Sun 5/4/2025, No Print      ibuprofen (MOTRIN) 600 mg tablet Take 1 tablet (600 mg total) by mouth every 6 (six) hours, Starting Sun 5/4/2025, Normal      lidocaine (LIDODERM) 5 % Apply 1 patch topically over 12 hours daily Remove & Discard patch within 12 hours or as directed by MD, Starting Mon 5/5/2025, Normal      Microlet Lancets MISC Use 4 a day. GDM, Normal      Prenatal Vit-Fe Fumarate-FA (PRENATAL PO) Take by mouth, Historical Med             ED  SEPSIS DOCUMENTATION   Time reflects when diagnosis was documented in both MDM as applicable and the Disposition within this note       Time User Action Codes Description Comment    2025  4:51 AM James Ramirez Add [R07.9] Chest pain     2025  4:51 AM James Ramirez Add [M54.9] Upper back pain     2025  4:51 AM James Ramirez Add [O92.4] Decreased lactation     2025  4:53 AM James Ramirez [R52] Pain aggravated by eating or drinking                    [1]   Past Medical History:  Diagnosis Date    Migraine    [2]   Past Surgical History:  Procedure Laterality Date    OTHER SURGICAL HISTORY Right     Arm Fractured bone at 6 y.o    IN  DELIVERY ONLY N/A 2025    Procedure:  SECTION ();  Surgeon: Rabia Sandoval MD;  Location: [facility] ;  Service: Obstetrics   [3]   Family History  Problem Relation Name Age of Onset    Thyroid disease Mother Dodie gleason     Other Mother Dodie arroyo         Tachycardia    Asthma Mother Dodie gleason     Heart attack Father Bull gleason         Pacemaker    Diabetes Father Bull gleason         T2DM    No Known Problems Sister Lis     Other Sister Rocio         Tachycardia due to anxiety    No Known Problems Sister Sade     Diabetes Maternal Grandmother      Stroke Maternal Grandmother      No Known Problems Paternal Grandmother      Diabetes Paternal Grandfather      Asthma Sister Lis gleason     Asthma Sister Rocio gleason     Breast cancer Neg Hx      Colon cancer Neg Hx      Ovarian cancer Neg Hx     [4]   Social History  Tobacco Use    Smoking status: Never    Smokeless tobacco: Never   Vaping Use    Vaping status: Never Used   Substance Use Topics    Alcohol use: Never    Drug use: Never        James Ramirez PA-C  25 [id number]

## (undated) DEVICE — GLOVE SRG BIOGEL ECLIPSE 7

## (undated) DEVICE — Device

## (undated) DEVICE — MEDI-VAC YANKAUER SUCTION HANDLE W/STRAIGHT TIP & CONTROL VENT: Brand: CARDINAL HEALTH

## (undated) DEVICE — SKIN MARKER DUAL TIP WITH RULER CAP, FLEXIBLE RULER AND LABELS: Brand: DEVON

## (undated) DEVICE — ADHESIVE SKN CLSR HISTOACRYL FLEX 0.5ML LF

## (undated) DEVICE — GLOVE INDICATOR PI UNDERGLOVE SZ 7 BLUE

## (undated) DEVICE — CHLORAPREP HI-LITE 26ML ORANGE

## (undated) DEVICE — SUT MONOCRYL 3-0 UNDYED KS CS-1 Y523H

## (undated) DEVICE — SUT VICRYL 0 CTX 36 IN J978H

## (undated) DEVICE — SUT VICRYL 0 CT-1 27 IN J260H

## (undated) DEVICE — PACK C-SECTION PBDS

## (undated) DEVICE — ADHESIVE SKIN HIGH VISCOSITY EXOFIN 1ML